# Patient Record
Sex: FEMALE | Race: WHITE | HISPANIC OR LATINO | Employment: PART TIME | URBAN - METROPOLITAN AREA
[De-identification: names, ages, dates, MRNs, and addresses within clinical notes are randomized per-mention and may not be internally consistent; named-entity substitution may affect disease eponyms.]

---

## 2016-09-19 LAB
EXTERNAL HIV CONFIRMATION: NORMAL
EXTERNAL HIV SCREEN: NORMAL
HCV AB SER-ACNC: NEGATIVE

## 2018-04-12 ENCOUNTER — TRANSCRIBE ORDERS (OUTPATIENT)
Dept: LAB | Facility: HOSPITAL | Age: 33
End: 2018-04-12

## 2018-04-12 ENCOUNTER — APPOINTMENT (OUTPATIENT)
Dept: LAB | Facility: HOSPITAL | Age: 33
End: 2018-04-12
Payer: COMMERCIAL

## 2018-04-12 DIAGNOSIS — F31.32 MODERATE DEPRESSED BIPOLAR I DISORDER (HCC): ICD-10-CM

## 2018-04-12 DIAGNOSIS — F31.32 MODERATE DEPRESSED BIPOLAR I DISORDER (HCC): Primary | ICD-10-CM

## 2018-04-12 LAB
25(OH)D3 SERPL-MCNC: 10.7 NG/ML (ref 30–100)
ALBUMIN SERPL BCP-MCNC: 3.8 G/DL (ref 3.5–5)
ALP SERPL-CCNC: 73 U/L (ref 46–116)
ALT SERPL W P-5'-P-CCNC: 10 U/L (ref 12–78)
ANION GAP SERPL CALCULATED.3IONS-SCNC: 4 MMOL/L (ref 4–13)
AST SERPL W P-5'-P-CCNC: 7 U/L (ref 5–45)
BASOPHILS # BLD AUTO: 0.03 THOUSANDS/ΜL (ref 0–0.1)
BASOPHILS NFR BLD AUTO: 0 % (ref 0–1)
BILIRUB SERPL-MCNC: 0.32 MG/DL (ref 0.2–1)
BUN SERPL-MCNC: 12 MG/DL (ref 5–25)
CALCIUM SERPL-MCNC: 9.3 MG/DL
CHLORIDE SERPL-SCNC: 109 MMOL/L (ref 100–108)
CHOLEST SERPL-MCNC: 160 MG/DL (ref 50–200)
CO2 SERPL-SCNC: 28 MMOL/L (ref 21–32)
CREAT SERPL-MCNC: 0.86 MG/DL (ref 0.6–1.3)
EOSINOPHIL # BLD AUTO: 0.16 THOUSAND/ΜL (ref 0–0.61)
EOSINOPHIL NFR BLD AUTO: 2 % (ref 0–6)
ERYTHROCYTE [DISTWIDTH] IN BLOOD BY AUTOMATED COUNT: 14.9 % (ref 11.6–15.1)
GFR SERPL CREATININE-BSD FRML MDRD: 89 ML/MIN/1.73SQ M
GLUCOSE P FAST SERPL-MCNC: 85 MG/DL (ref 65–99)
HCT VFR BLD AUTO: 45.9 % (ref 34.8–46.1)
HDLC SERPL-MCNC: 34 MG/DL (ref 40–60)
HGB BLD-MCNC: 15 G/DL (ref 11.5–15.4)
LDLC SERPL CALC-MCNC: 102 MG/DL (ref 0–100)
LYMPHOCYTES # BLD AUTO: 2.34 THOUSANDS/ΜL (ref 0.6–4.47)
LYMPHOCYTES NFR BLD AUTO: 22 % (ref 14–44)
MCH RBC QN AUTO: 31.4 PG (ref 26.8–34.3)
MCHC RBC AUTO-ENTMCNC: 32.7 G/DL (ref 31.4–37.4)
MCV RBC AUTO: 96 FL (ref 82–98)
MONOCYTES # BLD AUTO: 0.72 THOUSAND/ΜL (ref 0.17–1.22)
MONOCYTES NFR BLD AUTO: 7 % (ref 4–12)
NEUTROPHILS # BLD AUTO: 7.21 THOUSANDS/ΜL (ref 1.85–7.62)
NEUTS SEG NFR BLD AUTO: 69 % (ref 43–75)
NONHDLC SERPL-MCNC: 126 MG/DL
NRBC BLD AUTO-RTO: 0 /100 WBCS
PLATELET # BLD AUTO: 342 THOUSANDS/UL (ref 149–390)
PMV BLD AUTO: 10.4 FL (ref 8.9–12.7)
POTASSIUM SERPL-SCNC: 4 MMOL/L (ref 3.5–5.3)
PROT SERPL-MCNC: 7.8 G/DL (ref 6.4–8.2)
RBC # BLD AUTO: 4.78 MILLION/UL (ref 3.81–5.12)
SODIUM SERPL-SCNC: 141 MMOL/L (ref 136–145)
TRIGL SERPL-MCNC: 118 MG/DL
TSH SERPL DL<=0.05 MIU/L-ACNC: 1.4 UIU/ML (ref 0.36–3.74)
WBC # BLD AUTO: 10.49 THOUSAND/UL (ref 4.31–10.16)

## 2018-04-12 PROCEDURE — 80061 LIPID PANEL: CPT

## 2018-04-12 PROCEDURE — 85025 COMPLETE CBC W/AUTO DIFF WBC: CPT

## 2018-04-12 PROCEDURE — 84443 ASSAY THYROID STIM HORMONE: CPT

## 2018-04-12 PROCEDURE — 82306 VITAMIN D 25 HYDROXY: CPT

## 2018-04-12 PROCEDURE — 36415 COLL VENOUS BLD VENIPUNCTURE: CPT

## 2018-04-12 PROCEDURE — 80053 COMPREHEN METABOLIC PANEL: CPT

## 2018-06-22 ENCOUNTER — APPOINTMENT (OUTPATIENT)
Dept: LAB | Facility: HOSPITAL | Age: 33
End: 2018-06-22
Payer: COMMERCIAL

## 2018-06-22 ENCOUNTER — TRANSCRIBE ORDERS (OUTPATIENT)
Dept: LAB | Facility: HOSPITAL | Age: 33
End: 2018-06-22

## 2018-06-22 DIAGNOSIS — N92.6 IRREGULAR MENSTRUAL CYCLE: Primary | ICD-10-CM

## 2018-06-22 DIAGNOSIS — N92.6 IRREGULAR MENSTRUAL CYCLE: ICD-10-CM

## 2018-06-22 LAB
B-HCG SERPL-ACNC: <2 MIU/ML
ERYTHROCYTE [DISTWIDTH] IN BLOOD BY AUTOMATED COUNT: 14.9 % (ref 11.6–15.1)
HCT VFR BLD AUTO: 43.4 % (ref 34.8–46.1)
HGB BLD-MCNC: 14 G/DL (ref 11.5–15.4)
MCH RBC QN AUTO: 30.9 PG (ref 26.8–34.3)
MCHC RBC AUTO-ENTMCNC: 32.3 G/DL (ref 31.4–37.4)
MCV RBC AUTO: 96 FL (ref 82–98)
PLATELET # BLD AUTO: 329 THOUSANDS/UL (ref 149–390)
PMV BLD AUTO: 10.2 FL (ref 8.9–12.7)
RBC # BLD AUTO: 4.53 MILLION/UL (ref 3.81–5.12)
WBC # BLD AUTO: 9.08 THOUSAND/UL (ref 4.31–10.16)

## 2018-06-22 PROCEDURE — 84702 CHORIONIC GONADOTROPIN TEST: CPT

## 2018-06-22 PROCEDURE — 85027 COMPLETE CBC AUTOMATED: CPT

## 2018-06-22 PROCEDURE — 36415 COLL VENOUS BLD VENIPUNCTURE: CPT

## 2019-01-02 RX ORDER — MIRTAZAPINE 30 MG/1
TABLET, FILM COATED ORAL
Refills: 0 | COMMUNITY
Start: 2018-11-27 | End: 2020-10-21 | Stop reason: ALTCHOICE

## 2019-01-02 RX ORDER — ARIPIPRAZOLE 15 MG/1
TABLET ORAL
Refills: 0 | COMMUNITY
Start: 2018-11-27 | End: 2019-01-03 | Stop reason: CLARIF

## 2019-01-03 ENCOUNTER — TELEPHONE (OUTPATIENT)
Dept: INTERNAL MEDICINE CLINIC | Facility: CLINIC | Age: 34
End: 2019-01-03

## 2019-01-03 ENCOUNTER — OFFICE VISIT (OUTPATIENT)
Dept: INTERNAL MEDICINE CLINIC | Facility: CLINIC | Age: 34
End: 2019-01-03

## 2019-01-03 VITALS
DIASTOLIC BLOOD PRESSURE: 70 MMHG | WEIGHT: 148.15 LBS | HEIGHT: 63 IN | BODY MASS INDEX: 26.25 KG/M2 | TEMPERATURE: 97.8 F | HEART RATE: 88 BPM | SYSTOLIC BLOOD PRESSURE: 112 MMHG

## 2019-01-03 DIAGNOSIS — J45.20 MILD INTERMITTENT ASTHMA WITHOUT COMPLICATION: ICD-10-CM

## 2019-01-03 DIAGNOSIS — R11.0 NAUSEA: Primary | ICD-10-CM

## 2019-01-03 DIAGNOSIS — F32.A DEPRESSION, UNSPECIFIED DEPRESSION TYPE: ICD-10-CM

## 2019-01-03 DIAGNOSIS — R10.13 EPIGASTRIC PAIN: ICD-10-CM

## 2019-01-03 DIAGNOSIS — Z23 NEED FOR PROPHYLACTIC VACCINATION AND INOCULATION AGAINST INFLUENZA: ICD-10-CM

## 2019-01-03 DIAGNOSIS — F41.9 ANXIETY: ICD-10-CM

## 2019-01-03 PROCEDURE — 99204 OFFICE O/P NEW MOD 45 MIN: CPT | Performed by: INTERNAL MEDICINE

## 2019-01-03 PROCEDURE — 90686 IIV4 VACC NO PRSV 0.5 ML IM: CPT | Performed by: INTERNAL MEDICINE

## 2019-01-03 PROCEDURE — 90471 IMMUNIZATION ADMIN: CPT | Performed by: INTERNAL MEDICINE

## 2019-01-03 RX ORDER — DICYCLOMINE HYDROCHLORIDE 10 MG/1
20 CAPSULE ORAL 3 TIMES DAILY PRN
Qty: 40 CAPSULE | Refills: 0 | Status: SHIPPED | OUTPATIENT
Start: 2019-01-03 | End: 2019-05-23 | Stop reason: SDUPTHER

## 2019-01-03 NOTE — PROGRESS NOTES
ASSESSMENT/PLAN:  Problem List Items Addressed This Visit     Mild intermittent asthma     · Patient states she has albuterol rescue inhaler at home  · Stable; states she uses rescue inhaler maybe once every other month         Anxiety     Continue to follow at [facility] in [address] to follow up at [facility] in [facility]         Relevant Medications    mirtazapine (REMERON) 30 mg tablet    Nausea     · Bentyl 20 mg p r n  t i d  for nausea and abdominal pain  · If symptoms not improved after 2 weeks patient was advised to come back to clinic  · Keep up on fluid intake         Relevant Medications    dicyclomine (BENTYL) 10 mg capsule    Epigastric pain     · Patient recently seen at [facility] Emergency Department  · Lipase elevated to 973  · CT abdomen showed no signs of pancreatitis  · Likely secondary to gastroenteritis         Relevant Medications    dicyclomine (BENTYL) 10 mg capsule      Other Visit Diagnoses     Need for prophylactic vaccination and inoculation against influenza    -  Primary    Relevant Orders    SYRINGE/SINGLE-DOSE VIAL: influenza vaccine, [id number], quadrivalent, 0 5 mL, preservative-free (AFLURIA, FLUARIX, FLULAVAL, FLUZONE) (Completed)          Health Maintenance:  Flu shot 01/03/2019      CHIEF COMPLAINT:   Abdominal pain    HISTORY OF PRESENT ILLNESS:  71-year-old female presents to the outpatient clinic to establish care  Past medical history significant for mild intermittent asthma, anxiety, and depression  Patient was recently seen at [facility] Emergency Department due to abdominal pain with nausea and vomiting  She was given Zofran to help with the nausea and they stated is likely to gastroenteritis patient has been complaining of nausea and vomiting since 12/28 and was given Zofran to help this after going to the emergency department    She states Zofran does help with the nausea but she is still having abdominal pain  She is also complaining of fever and chills but no temperature noted  She is having abdominal pain in the center of her stomach that she describes as burning and pressure  She states her son also had the symptoms have since resolved  Patient denies alcohol and drug use  Patient currently smokes 1 pack per day  Review of Systems   Constitutional: Positive for chills and fever  Negative for fatigue and unexpected weight change  HENT: Negative for congestion, rhinorrhea, sinus pain and sinus pressure  Eyes: Negative for visual disturbance  Respiratory: Negative for cough, chest tightness, shortness of breath and wheezing  Cardiovascular: Negative for chest pain, palpitations and leg swelling  Gastrointestinal: Positive for diarrhea and nausea  Negative for abdominal distention, abdominal pain, constipation and vomiting  Genitourinary: Negative for dysuria and frequency  Neurological: Positive for light-headedness  Negative for dizziness, weakness, numbness and headaches  OBJECTIVE:  Vitals:    01/03/19 1401   BP: 112/70   Pulse: 88   Temp: 97 8 °F (36 6 °C)   Weight: 67 2 kg (148 lb 2 4 oz)   Height: 5' 2 5" (1 588 m)     Physical Exam   Constitutional: She is oriented to person, place, and time  She appears well-developed and well-nourished  No distress  HENT:   Head: Normocephalic and atraumatic  Eyes: Conjunctivae are normal  No scleral icterus  Cardiovascular: Normal rate, regular rhythm and normal heart sounds  Exam reveals no gallop and no friction rub  No murmur heard  Pulmonary/Chest: Effort normal and breath sounds normal  No respiratory distress  She has no wheezes  She has no rales  Abdominal: Soft  Bowel sounds are normal  She exhibits no distension  There is tenderness  There is no rebound and no guarding  Tenderness to deep palpation in epigastric region   Musculoskeletal: Normal range of motion  She exhibits no edema     Neurological: She is alert and oriented to person, place, and time  Skin: Skin is warm  No rash noted  Nursing note and vitals reviewed  Current Outpatient Prescriptions:     albuterol (PROVENTIL HFA,VENTOLIN HFA) 90 mcg/act inhaler, Inhale 2 puffs every 6 (six) hours, Disp: , Rfl:     ARIPiprazole (ABILIFY) 10 mg tablet, Take 10 mg by mouth daily at bedtime, Disp: , Rfl: 0    busPIRone (BUSPAR) 10 mg tablet, Take by mouth, Disp: , Rfl:     fluticasone (FLONASE) 50 mcg/act nasal spray, 2 sprays into each nostril, Disp: , Rfl:     lamoTRIgine (LaMICtal) 100 mg tablet, Take 100 mg by mouth every morning, Disp: , Rfl: 0    mirtazapine (REMERON) 30 mg tablet, TAKE 1 TABLET POR VIA ORAL ANTES DE DORMIR EN LA NOCHE, Disp: , Rfl: 0    dicyclomine (BENTYL) 10 mg capsule, Take 2 capsules (20 mg total) by mouth 3 (three) times a day as needed (n[place], abdominal pain), Disp: 40 capsule, Rfl: 0    Past Medical History:   Diagnosis Date    Asthma     Chlamydia infection     Depression     Varicose veins of both lower extremities      History reviewed  No pertinent surgical history  Social History     Social History    Marital status: Single     Spouse name: N/A    Number of children: N/A    Years of education: N/A     Occupational History    Not on file       Social History Main Topics    Smoking status: Current Every Day Smoker     Packs/day: 1 00     Years: 15 00    Smokeless tobacco: Never Used    Alcohol use Yes      Comment: social    Drug use: No    Sexual activity: Not on file     Other Topics Concern    Not on file     Social History Narrative    No narrative on file     Family History   Problem Relation Age of Onset    Diabetes Brother      PHQ-9 Depression Screening    PHQ-9:    Frequency of the following problems over the past two weeks:       Little interest or pleasure in doing things:  0 - not at all  Feeling down, depressed, or hopeless:  1 - several days  Trouble falling or staying asleep, or sleeping too much:  3 - nearly every day  Feeling tired or having little energy:  3 - nearly every day  Poor appetite or overeatin - several days  Feeling bad about yourself - or that you are a failure or have let yourself or your family down:  0 - not at all  Trouble concentrating on things, such as reading the newspaper or watching television:  1 - several days  Moving or speaking so slowly that other people could have noticed  Or the opposite - being so fidgety or restless that you have been moving around a lot more than usual:  0 - not at all  Thoughts that you would be better off dead, or of hurting yourself in some way:  0 - not at all  PHQ-2 Score:  1  PHQ-9 Score:  Cong Rg 47 D BRICE Mitchell 72 Internal Medicine PGY-1  [address] [facility]  [address] [facility]  [address] [facility] [address]  [address]

## 2019-01-03 NOTE — PROGRESS NOTES
PHQ-9 Depression Screening    PHQ-9:    Frequency of the following problems over the past two weeks:       Little interest or pleasure in doing things:  0 - not at all  Feeling down, depressed, or hopeless:  1 - several days  Trouble falling or staying asleep, or sleeping too much:  3 - nearly every day  Feeling tired or having little energy:  3 - nearly every day  Poor appetite or overeatin - several days  Feeling bad about yourself - or that you are a failure or have let yourself or your family down:  0 - not at all  Trouble concentrating on things, such as reading the newspaper or watching television:  1 - several days  Moving or speaking so slowly that other people could have noticed   Or the opposite - being so fidgety or restless that you have been moving around a lot more than usual:  0 - not at all  Thoughts that you would be better off dead, or of hurting yourself in some way:  0 - not at all  PHQ-2 Score:  1  PHQ-9 Score:  9

## 2019-01-03 NOTE — ASSESSMENT & PLAN NOTE
· Patient recently seen at [facility] Emergency Department  · Lipase elevated to 973  · CT abdomen showed no signs of pancreatitis  · Likely secondary to gastroenteritis

## 2019-01-03 NOTE — ASSESSMENT & PLAN NOTE
· Patient states she has albuterol rescue inhaler at home  · Stable; states she uses rescue inhaler maybe once every other month

## 2019-01-03 NOTE — ASSESSMENT & PLAN NOTE
· Bentyl 20 mg p r n  t i d  for nausea and abdominal pain  · If symptoms not improved after 2 weeks patient was advised to come back to clinic  · Keep up on fluid intake

## 2019-01-04 DIAGNOSIS — Z00.00 HEALTHCARE MAINTENANCE: Primary | ICD-10-CM

## 2019-01-04 NOTE — TELEPHONE ENCOUNTER
Physical exam sheet placed in green folder  Patient also needs PPD administration filled out on paper, which she will have done when she comes in for nursing visit

## 2019-01-23 DIAGNOSIS — Z11.1 SCREENING FOR TUBERCULOSIS: Primary | ICD-10-CM

## 2019-01-28 ENCOUNTER — TELEPHONE (OUTPATIENT)
Dept: INTERNAL MEDICINE CLINIC | Facility: CLINIC | Age: 34
End: 2019-01-28

## 2019-01-28 DIAGNOSIS — Z11.1 SCREENING FOR TUBERCULOSIS: Primary | ICD-10-CM

## 2019-02-05 ENCOUNTER — CLINICAL SUPPORT (OUTPATIENT)
Dept: INTERNAL MEDICINE CLINIC | Facility: CLINIC | Age: 34
End: 2019-02-05

## 2019-02-05 ENCOUNTER — TELEPHONE (OUTPATIENT)
Dept: INTERNAL MEDICINE CLINIC | Facility: CLINIC | Age: 34
End: 2019-02-05

## 2019-02-05 DIAGNOSIS — Z11.1 SCREENING FOR TUBERCULOSIS: Primary | ICD-10-CM

## 2019-02-05 PROCEDURE — 86580 TB INTRADERMAL TEST: CPT | Performed by: INTERNAL MEDICINE

## 2019-02-05 NOTE — PROGRESS NOTES
PT  CAME IN THE OFFICE FOR PPD PLACEMENT  0 1 ML PLACED IN RIGHT FOREARM   FORM IN GREEN RESIDENT FOLDER UNTIL READING ON 2/7 OR 2/8/19

## 2019-02-05 NOTE — TELEPHONE ENCOUNTER
PPD PLACED 2/5/19 AT 11:15 A M  IN RIGHT FOREARM  PT  TO RETURN ON 2/7/19 AFTER 11 OR 2/8/19 FOR READING   FORM IN GREEN RESIDENT FOLDER UNTIL READING IS COMPLETE

## 2019-02-08 ENCOUNTER — APPOINTMENT (OUTPATIENT)
Dept: LAB | Facility: CLINIC | Age: 34
End: 2019-02-08
Payer: COMMERCIAL

## 2019-02-08 DIAGNOSIS — Z78.9 HEPATITIS B VACCINATION STATUS UNKNOWN: Primary | ICD-10-CM

## 2019-02-08 DIAGNOSIS — Z78.9 HEPATITIS B VACCINATION STATUS UNKNOWN: ICD-10-CM

## 2019-02-08 LAB
INDURATION: 0 MM
TB SKIN TEST: NEGATIVE

## 2019-02-08 PROCEDURE — 86706 HEP B SURFACE ANTIBODY: CPT

## 2019-02-08 PROCEDURE — 36415 COLL VENOUS BLD VENIPUNCTURE: CPT

## 2019-02-08 NOTE — TELEPHONE ENCOUNTER
Patient returned to office for PPD reading  PPD negative, 0mm induration  Form requests Hep B vaccination or proof of immunity  Ordered Hep B antibody and patient will get blood work done now  I informed patient we would have results by Monday  Paperwork still in Cristino Flores provider folder

## 2019-02-09 LAB — HBV SURFACE AB SER-ACNC: 49.09 MIU/ML

## 2019-02-13 NOTE — TELEPHONE ENCOUNTER
PATIENT CALLED REGARDING THIS FORM, SHE WANTED IT TO BE FAXED TO HER JOB AGENCY, GOT THE FORM FROM NURSE KALA AND FAXED OVER WITH DOCUMENTATION THAT WAS PROVIDED TO ME BY HER  FORM FAXED, CONFIRMATION RECEIVED, COPY OF FORM IS SCANNED UNDER THIS ENCOUNTER

## 2019-05-13 ENCOUNTER — TELEPHONE (OUTPATIENT)
Dept: INTERNAL MEDICINE CLINIC | Facility: CLINIC | Age: 34
End: 2019-05-13

## 2019-05-22 RX ORDER — IBUPROFEN 600 MG/1
600 TABLET ORAL EVERY 8 HOURS PRN
COMMUNITY
Start: 2018-01-29 | End: 2019-05-23 | Stop reason: SDUPTHER

## 2019-05-22 RX ORDER — MONTELUKAST SODIUM 10 MG/1
TABLET ORAL
COMMUNITY
Start: 2017-08-11 | End: 2019-05-23 | Stop reason: SDUPTHER

## 2019-05-22 RX ORDER — ONDANSETRON 4 MG/1
4 TABLET, ORALLY DISINTEGRATING ORAL EVERY 8 HOURS PRN
COMMUNITY
Start: 2018-12-30 | End: 2019-06-06

## 2019-05-22 RX ORDER — HYDROXYZINE HYDROCHLORIDE 25 MG/1
25 TABLET, FILM COATED ORAL EVERY 6 HOURS PRN
COMMUNITY
End: 2019-06-06

## 2019-05-23 ENCOUNTER — OFFICE VISIT (OUTPATIENT)
Dept: INTERNAL MEDICINE CLINIC | Facility: CLINIC | Age: 34
End: 2019-05-23

## 2019-05-23 VITALS
HEIGHT: 63 IN | TEMPERATURE: 97.6 F | DIASTOLIC BLOOD PRESSURE: 70 MMHG | HEART RATE: 100 BPM | SYSTOLIC BLOOD PRESSURE: 114 MMHG | BODY MASS INDEX: 27.27 KG/M2 | WEIGHT: 153.88 LBS

## 2019-05-23 DIAGNOSIS — F41.9 ANXIETY: ICD-10-CM

## 2019-05-23 DIAGNOSIS — J45.20 MILD INTERMITTENT ASTHMA WITHOUT COMPLICATION: Primary | ICD-10-CM

## 2019-05-23 DIAGNOSIS — F32.A DEPRESSION, UNSPECIFIED DEPRESSION TYPE: ICD-10-CM

## 2019-05-23 DIAGNOSIS — N92.1 MENORRHAGIA WITH IRREGULAR CYCLE: ICD-10-CM

## 2019-05-23 DIAGNOSIS — R11.0 NAUSEA: ICD-10-CM

## 2019-05-23 DIAGNOSIS — Z12.4 SCREENING FOR CERVICAL CANCER: ICD-10-CM

## 2019-05-23 DIAGNOSIS — R10.13 EPIGASTRIC PAIN: ICD-10-CM

## 2019-05-23 PROCEDURE — 99213 OFFICE O/P EST LOW 20 MIN: CPT | Performed by: INTERNAL MEDICINE

## 2019-05-23 RX ORDER — ALBUTEROL SULFATE 90 UG/1
2 AEROSOL, METERED RESPIRATORY (INHALATION) EVERY 6 HOURS
Qty: 1 INHALER | Refills: 1 | Status: SHIPPED | OUTPATIENT
Start: 2019-05-23 | End: 2019-06-20 | Stop reason: SDUPTHER

## 2019-05-23 RX ORDER — ONDANSETRON 4 MG/1
4 TABLET, ORALLY DISINTEGRATING ORAL EVERY 8 HOURS PRN
Qty: 20 TABLET | Refills: 0 | Status: CANCELLED | OUTPATIENT
Start: 2019-05-23

## 2019-05-23 RX ORDER — MONTELUKAST SODIUM 10 MG/1
10 TABLET ORAL DAILY
Qty: 90 TABLET | Refills: 1 | Status: SHIPPED | OUTPATIENT
Start: 2019-05-23 | End: 2019-08-12 | Stop reason: SDUPTHER

## 2019-05-23 RX ORDER — IBUPROFEN 600 MG/1
600 TABLET ORAL EVERY 8 HOURS PRN
Qty: 60 TABLET | Refills: 0 | Status: SHIPPED | OUTPATIENT
Start: 2019-05-23 | End: 2021-08-24 | Stop reason: SDUPTHER

## 2019-05-23 RX ORDER — DICYCLOMINE HYDROCHLORIDE 10 MG/1
20 CAPSULE ORAL 3 TIMES DAILY PRN
Qty: 90 CAPSULE | Refills: 0 | Status: SHIPPED | OUTPATIENT
Start: 2019-05-23 | End: 2019-08-07 | Stop reason: SDUPTHER

## 2019-06-06 ENCOUNTER — OFFICE VISIT (OUTPATIENT)
Dept: OBGYN CLINIC | Facility: CLINIC | Age: 34
End: 2019-06-06

## 2019-06-06 VITALS
HEART RATE: 91 BPM | BODY MASS INDEX: 28.71 KG/M2 | WEIGHT: 156 LBS | SYSTOLIC BLOOD PRESSURE: 121 MMHG | DIASTOLIC BLOOD PRESSURE: 84 MMHG | HEIGHT: 62 IN

## 2019-06-06 DIAGNOSIS — F32.A DEPRESSION, UNSPECIFIED DEPRESSION TYPE: ICD-10-CM

## 2019-06-06 DIAGNOSIS — Z12.4 SCREENING FOR CERVICAL CANCER: ICD-10-CM

## 2019-06-06 DIAGNOSIS — B96.89 BV (BACTERIAL VAGINOSIS): ICD-10-CM

## 2019-06-06 DIAGNOSIS — Z01.419 WOMEN'S ANNUAL ROUTINE GYNECOLOGICAL EXAMINATION: Primary | ICD-10-CM

## 2019-06-06 DIAGNOSIS — N76.0 BV (BACTERIAL VAGINOSIS): ICD-10-CM

## 2019-06-06 PROCEDURE — 87624 HPV HI-RISK TYP POOLED RSLT: CPT | Performed by: NURSE PRACTITIONER

## 2019-06-06 PROCEDURE — G0124 SCREEN C/V THIN LAYER BY MD: HCPCS | Performed by: PATHOLOGY

## 2019-06-06 PROCEDURE — 3725F SCREEN DEPRESSION PERFORMED: CPT | Performed by: NURSE PRACTITIONER

## 2019-06-06 PROCEDURE — 87210 SMEAR WET MOUNT SALINE/INK: CPT | Performed by: NURSE PRACTITIONER

## 2019-06-06 PROCEDURE — G0145 SCR C/V CYTO,THINLAYER,RESCR: HCPCS | Performed by: PATHOLOGY

## 2019-06-06 PROCEDURE — 99385 PREV VISIT NEW AGE 18-39: CPT | Performed by: NURSE PRACTITIONER

## 2019-06-06 RX ORDER — METRONIDAZOLE 500 MG/1
500 TABLET ORAL EVERY 12 HOURS SCHEDULED
Qty: 14 TABLET | Refills: 0 | Status: SHIPPED | OUTPATIENT
Start: 2019-06-06 | End: 2019-06-13

## 2019-06-07 LAB
HPV HR 12 DNA CVX QL NAA+PROBE: NEGATIVE
HPV16 DNA CVX QL NAA+PROBE: NEGATIVE
HPV18 DNA CVX QL NAA+PROBE: NEGATIVE

## 2019-06-08 DIAGNOSIS — R11.0 NAUSEA: ICD-10-CM

## 2019-06-08 DIAGNOSIS — R10.13 EPIGASTRIC PAIN: ICD-10-CM

## 2019-06-11 LAB
LAB AP GYN PRIMARY INTERPRETATION: ABNORMAL
Lab: ABNORMAL
PATH INTERP SPEC-IMP: ABNORMAL

## 2019-06-12 RX ORDER — DICYCLOMINE HYDROCHLORIDE 10 MG/1
CAPSULE ORAL
Qty: 90 CAPSULE | Refills: 0 | OUTPATIENT
Start: 2019-06-12

## 2019-06-20 DIAGNOSIS — J45.20 MILD INTERMITTENT ASTHMA WITHOUT COMPLICATION: ICD-10-CM

## 2019-06-20 RX ORDER — ALBUTEROL SULFATE 90 UG/1
AEROSOL, METERED RESPIRATORY (INHALATION)
Qty: 36 G | Refills: 0 | Status: SHIPPED | OUTPATIENT
Start: 2019-06-20 | End: 2019-08-12 | Stop reason: SDUPTHER

## 2019-07-14 ENCOUNTER — HOSPITAL ENCOUNTER (EMERGENCY)
Facility: HOSPITAL | Age: 34
Discharge: HOME | End: 2019-07-14
Attending: EMERGENCY MEDICINE
Payer: COMMERCIAL

## 2019-07-14 VITALS
HEART RATE: 85 BPM | HEIGHT: 62 IN | SYSTOLIC BLOOD PRESSURE: 115 MMHG | OXYGEN SATURATION: 98 % | TEMPERATURE: 97.4 F | WEIGHT: 158.07 LBS | RESPIRATION RATE: 18 BRPM | BODY MASS INDEX: 29.09 KG/M2 | DIASTOLIC BLOOD PRESSURE: 68 MMHG

## 2019-07-14 DIAGNOSIS — M54.2 NECK PAIN: Primary | ICD-10-CM

## 2019-07-14 PROCEDURE — 99283 EMERGENCY DEPT VISIT LOW MDM: CPT

## 2019-07-14 PROCEDURE — 99283 EMERGENCY DEPT VISIT LOW MDM: CPT | Performed by: EMERGENCY MEDICINE

## 2019-07-14 RX ORDER — IBUPROFEN 400 MG/1
400 TABLET ORAL EVERY 8 HOURS PRN
Qty: 45 TABLET | Refills: 0 | Status: SHIPPED | OUTPATIENT
Start: 2019-07-14 | End: 2019-07-29

## 2019-07-14 NOTE — ED PROVIDER NOTES
History  Chief Complaint   Patient presents with    Neck Pain     Pt reports left sided ear and neck pain that started about 1 month ago  Pt states "I have went to my doctor for it but they found nothing, I've been taking ibuprofen but I don't have any more"     Calvin Everett is a 28y o  year old Female with PMH of anxiety, depression, asthma presenting to the [facility] ED for neck pain  Patient states one month of left sided neck/face pain  No injury  Seen by PCP and diagnosed with musculoskeletal strain  Associated left-sided headache  Pain worsening over past several days when she ran out of ibuprofen  Denies fevers/chills, rhinorrhea, otalgia, chest pain, dyspnea, abdominal pain, N/V/D, dysuria/hematuria  History provided by:  Patient   used: No    Neck Pain   Pain location:  L side  Quality:  Aching  Pain radiates to:  Does not radiate  Pain severity:  Moderate  Pain is:  Same all the time  Onset quality:  Gradual  Duration:  1 month  Timing:  Constant  Progression since onset: Worse since running out of ibupropfen  Chronicity:  Recurrent  Context: not fall, not jumping from heights, not lifting a heavy object and not recent injury    Relieved by:  NSAIDs  Associated symptoms: headaches    Associated symptoms: no chest pain, no fever, no leg pain, no numbness, no syncope and no visual change    Risk factors: no hx of spinal trauma and no recent head injury        Prior to Admission Medications   Prescriptions Last Dose Informant Patient Reported? Taking?    ARIPiprazole (ABILIFY) 10 mg tablet   Yes Yes   Sig: Take 10 mg by mouth daily at bedtime   albuterol (PROVENTIL HFA,VENTOLIN HFA) 90 mcg/act inhaler   No Yes   Sig: INHALE 2 PUFFS POR VIA ORAL CADA 6 HORAS   busPIRone (BUSPAR) 10 mg tablet   Yes Yes   Sig: Take by mouth   dicyclomine (BENTYL) 10 mg capsule   No Yes   Sig: Take 2 capsules (20 mg total) by mouth 3 (three) times a day as needed (naseau, abdominal pain)   fluticasone (FLONASE) 50 mcg/act nasal spray   Yes Yes   Si sprays into each nostril   ibuprofen (MOTRIN) 600 mg tablet   No No   Sig: Take 1 tablet (600 mg total) by mouth every 8 (eight) hours as needed for fever or headaches   lamoTRIgine (LaMICtal) 100 mg tablet   Yes Yes   Sig: Take 100 mg by mouth every morning   mirtazapine (REMERON) 30 mg tablet   Yes Yes   Sig: TAKE 1 TABLET POR VIA ORAL ANTES DE DORMIR EN LA NOCHE   montelukast (SINGULAIR) 10 mg tablet   No Yes   Sig: Take 1 tablet (10 mg total) by mouth daily      Facility-Administered Medications: None       Past Medical History:   Diagnosis Date    Asthma     Chlamydia infection     Depression     IBS (irritable bowel syndrome)     Migraine     Varicose veins of both lower extremities        Past Surgical History:   Procedure Laterality Date    COLPOSCOPY         Family History   Problem Relation Age of Onset    Diabetes Brother     No Known Problems Mother     Cirrhosis Father     No Known Problems Sister     Autism Son     No Known Problems Brother     Depression Son      I have reviewed and agree with the history as documented  Social History     Tobacco Use    Smoking status: Current Every Day Smoker     Packs/day: 1 00     Years: 15 00     Pack years: 15 00    Smokeless tobacco: Never Used   Substance Use Topics    Alcohol use: Yes     Comment: social    Drug use: Never        Review of Systems   Constitutional: Negative for chills, diaphoresis and fever  HENT: Positive for sore throat  Negative for dental problem, ear discharge, ear pain, facial swelling, nosebleeds, rhinorrhea and sneezing  Eyes: Negative for discharge, redness and visual disturbance  Respiratory: Negative for cough, shortness of breath and wheezing  Cardiovascular: Negative for chest pain, leg swelling and syncope  Gastrointestinal: Negative for abdominal pain, constipation, diarrhea, nausea and vomiting     Genitourinary: Negative for dysuria, flank pain and hematuria  Musculoskeletal: Positive for neck pain  Neurological: Positive for headaches  Negative for syncope and numbness  Psychiatric/Behavioral: Negative for confusion  All other systems reviewed and are negative  Physical Exam  ED Triage Vitals [07/14/19 1910]   Temperature Pulse Respirations Blood Pressure SpO2   (!) 97 4 °F (36 3 °C) 85 18 115/68 98 %      Temp Source Heart Rate Source Patient Position - Orthostatic VS BP Location FiO2 (%)   Oral Monitor Sitting Left arm --      Pain Score       8             Orthostatic Vital Signs  Vitals:    07/14/19 1910   BP: 115/68   Pulse: 85   Patient Position - Orthostatic VS: Sitting       Physical Exam   Constitutional: She is oriented to person, place, and time  She appears well-developed and well-nourished  No distress  HENT:   Head: Normocephalic and atraumatic  Right Ear: External ear normal    Left Ear: External ear normal    Nose: Nose normal    Mouth/Throat: Oropharynx is clear and moist  No oropharyngeal exudate  Eyes: Conjunctivae are normal  Right eye exhibits no discharge  Left eye exhibits no discharge  Neck: Normal range of motion  Neck supple  Cardiovascular: Normal rate and regular rhythm  Pulmonary/Chest: Effort normal and breath sounds normal    Abdominal: Soft  Bowel sounds are normal  There is no tenderness  Lymphadenopathy:     She has no cervical adenopathy  Neurological: She is alert and oriented to person, place, and time  Skin: Capillary refill takes less than 2 seconds  No rash noted  She is not diaphoretic  Psychiatric: She has a normal mood and affect  Nursing note and vitals reviewed        ED Medications  Medications - No data to display    Diagnostic Studies  Results Reviewed     None                 No orders to display         Procedures  Procedures        ED Course  ED Course as of Jul 14 2143   Madie Cole Jul 14, 2019 2024 I have discussed with the patient our plan to discharge them from the ED and the patient is in agreement with this plan  I have educated the patient on pertinent lab/imaging results and answered their questions  Return to the ED precautions given  I have also discussed with the patient plans for follow up with PCP  MDM    Disposition  Final diagnoses:   Neck pain - left side     Time reflects when diagnosis was documented in both MDM as applicable and the Disposition within this note     Time User Action Codes Description Comment    7/14/2019  7:57 PM Mathieu Will Add [M54 2] Neck pain     7/14/2019  7:59 PM Mathieu Will Modify [M54 2] Neck pain left side      ED Disposition     ED Disposition Condition Date/Time Comment    Discharge Stable Sun Jul 14, 2019  7:57 PM Gonzalo Vance discharge to home/self care  Follow-up Information     Follow up With Specialties Details Why Contact Info    Infolink  Schedule an appointment as soon as possible for a visit  To make appointment for reevaluation in 3-5 days  [phone]            Discharge Medication List as of 7/14/2019  8:18 PM      START taking these medications    Details   !! ibuprofen (MOTRIN) 400 mg tablet Take 1 tablet (400 mg total) by mouth every 8 (eight) hours as needed for mild pain for up to 15 days, Starting Sun 7/14/2019, Until Mon 7/29/2019, Print       !! - Potential duplicate medications found  Please discuss with provider        CONTINUE these medications which have NOT CHANGED    Details   albuterol (PROVENTIL HFA,VENTOLIN HFA) 90 mcg/act inhaler INHALE 2 PUFFS POR VIA ORAL CADA 6 HORAS, Normal      ARIPiprazole (ABILIFY) 10 mg tablet Take 10 mg by mouth daily at bedtime, Starting Sun 4/15/2018, Historical Med      busPIRone (BUSPAR) 10 mg tablet Take by mouth, Historical Med      dicyclomine (BENTYL) 10 mg capsule Take 2 capsules (20 mg total) by mouth 3 (three) times a day as needed (naseau, abdominal pain), Starting u 5/23/2019, Normal fluticasone (FLONASE) 50 mcg/act nasal spray 2 sprays into each nostril, Starting Fri 1/6/2017, Historical Med      lamoTRIgine (LaMICtal) 100 mg tablet Take 100 mg by mouth every morning, Starting Sun 4/15/2018, Historical Med      mirtazapine (REMERON) 30 mg tablet TAKE 1 TABLET POR VIA ORAL ANTES DE DORMIR EN LA NOCHE, Historical Med      montelukast (SINGULAIR) 10 mg tablet Take 1 tablet (10 mg total) by mouth daily, Starting u 5/23/2019, Normal      !! ibuprofen (MOTRIN) 600 mg tablet Take 1 tablet (600 mg total) by mouth every 8 (eight) hours as needed for fever or headaches, Starting u 5/23/2019, Normal       !! - Potential duplicate medications found  Please discuss with provider  No discharge procedures on file  ED Provider  Attending physically available and evaluated Flora Arroyo  HERMAN managed the patient along with the ED Attending      Electronically Signed by DO Barry Balderrama DO  07/14/19 0818

## 2019-07-14 NOTE — ED ATTENDING ATTESTATION
Princess Wagner MD, saw and evaluated the patient  I have discussed the patient with the resident/non-physician practitioner and agree with the resident's/non-physician practitioner's findings, Plan of Care, and MDM as documented in the resident's/non-physician practitioner's note, except where noted  All available labs and Radiology studies were reviewed  I was present for key portions of any procedure(s) performed by the resident/non-physician practitioner and I was immediately available to provide assistance  At this point I agree with the current assessment done in the Emergency Department  I have conducted an independent evaluation of this patient a history and physical is as follows: This is a 28 y o  old female who presents to the ED for evaluation of Left-sided neck pain  Patient states last month she has had pain on the left side of her neck  Diagnosis musculoskeletal pain by her PCP  Has been using ibuprofen for but recently ran out  No numbness or tingling in her hand  No neurologic symptoms  No yoga or chiropractor  VS and nursing notes reviewed  General: Appears in NAD, awake, alert, speaking normally in full sentences  Well-nourished, well-developed  Appears stated age  Head: Normocephalic, atraumatic  Eyes: EOMI  Vision grossly normal  No subconjunctival hemorrhages or occular discharge noted  Symmetrical lids  ENT: Atraumatic external nose and ears  No stridor  Normal phonation  No drooling  Normal swallowing  Neck: No JVD  FROM  No goiter  Tender over the L trapezius  CV: No pallor  Normal rate  Lungs: No tachypnea  No respiratory distress  MSK: Moving all extremities equally, no peripheral edema  Skin: Dry, intact  No cyanosis  Neuro: Awake, alert, GCS15  CN II-XII grossly intact  Grossly normal gait  Psychiatric/Behavioral: Appropriate mood and affect  A/P: This is a 28 y o  female who presents to the ED for evaluation of neck pain    Musculoskeletal  Ibuprofen, Robaxin, primary care follow-up       Critical Care Time  Procedures

## 2019-07-16 DIAGNOSIS — J45.20 MILD INTERMITTENT ASTHMA WITHOUT COMPLICATION: ICD-10-CM

## 2019-07-17 RX ORDER — MONTELUKAST SODIUM 10 MG/1
TABLET ORAL
Qty: 180 TABLET | OUTPATIENT
Start: 2019-07-17

## 2019-07-18 ENCOUNTER — OFFICE VISIT (OUTPATIENT)
Dept: INTERNAL MEDICINE CLINIC | Facility: CLINIC | Age: 34
End: 2019-07-18

## 2019-07-18 VITALS
DIASTOLIC BLOOD PRESSURE: 82 MMHG | HEART RATE: 94 BPM | SYSTOLIC BLOOD PRESSURE: 122 MMHG | WEIGHT: 158.51 LBS | TEMPERATURE: 98.8 F | HEIGHT: 63 IN | BODY MASS INDEX: 28.09 KG/M2

## 2019-07-18 DIAGNOSIS — M54.2 NECK PAIN ON LEFT SIDE: Primary | ICD-10-CM

## 2019-07-18 PROCEDURE — 99215 OFFICE O/P EST HI 40 MIN: CPT | Performed by: INTERNAL MEDICINE

## 2019-07-18 RX ORDER — PREDNISONE 20 MG/1
40 TABLET ORAL DAILY
Qty: 10 TABLET | Refills: 0 | Status: SHIPPED | OUTPATIENT
Start: 2019-07-18 | End: 2019-07-28

## 2019-07-18 RX ORDER — CYCLOBENZAPRINE HCL 10 MG
10 TABLET ORAL
Qty: 15 TABLET | Refills: 1 | Status: SHIPPED | OUTPATIENT
Start: 2019-07-18 | End: 2020-10-21 | Stop reason: ALTCHOICE

## 2019-07-18 RX ORDER — AMOXICILLIN AND CLAVULANATE POTASSIUM 875; 125 MG/1; MG/1
1 TABLET, FILM COATED ORAL EVERY 12 HOURS SCHEDULED
Qty: 20 TABLET | Refills: 0 | Status: SHIPPED | OUTPATIENT
Start: 2019-07-18 | End: 2019-07-28

## 2019-07-18 RX ORDER — DULOXETIN HYDROCHLORIDE 30 MG/1
60 CAPSULE, DELAYED RELEASE ORAL
Refills: 0 | COMMUNITY
Start: 2019-07-17

## 2019-07-18 NOTE — PATIENT INSTRUCTIONS
Please take following medications Augmentin, prednisone, Flexeril, ibuprofen p r n  As prescribed  If symptoms worsen please call, follow-up in 2 weeks  Neck Pain   WHAT YOU NEED TO KNOW:   You may have sudden neck pain that increases quickly  You may instead feel pain build slowly over time  Neck pain may go away in a few days or weeks, or it may continue for months  The pain may come and go, or be worse with certain movements  The pain may be only in your neck, or it may move to your arms, back, or shoulders  You may also have pain that starts in another body area and moves to your neck  Some types of neck pain are permanent  DISCHARGE INSTRUCTIONS:   Return to the emergency department if:   · You have an injury that causes neck pain and shooting pain down your arms or legs  · Your neck pain suddenly becomes severe  · You have neck pain along with numbness, tingling, or weakness in your arms or legs  · You have a stiff neck, a headache, and a fever  Contact your healthcare provider if:   · You have new or worsening symptoms  · Your symptoms continue even after treatment  · You have questions or concerns about your condition or care  Medicines: You may need any of the following:  · NSAIDs  , such as ibuprofen, help decrease swelling, pain, and fever  This medicine is available without a doctor's order  Ask your healthcare provider which medicine to take and how often to take it  Follow directions  NSAIDs can cause stomach bleeding or kidney problems if not taken correctly  If you take blood thinner medicine, always ask if NSAIDs are safe for you  · Acetaminophen  helps decrease pain and fever  Ask your healthcare provider how much to take and how often to take it  Follow directions  Acetaminophen can cause liver damage if not taken correctly  · Steroid medicine  may be used to reduce inflammation  This can help relieve pain caused by swelling  · Take your medicine as directed  Contact your healthcare provider if you think your medicine is not helping or if you have side effects  Tell him or her if you are allergic to any medicine  Keep a list of the medicines, vitamins, and herbs you take  Include the amounts, and when and why you take them  Bring the list or the pill bottles to follow-up visits  Carry your medicine list with you in case of an emergency  Manage or prevent neck pain:   · Rest your neck as directed  Do not make sudden movements, such as turning your head quickly  Your healthcare provider may recommend you wear a cervical collar for a short time  The collar will prevent you from moving your head  This will give your neck time to heal if an injury is causing your neck pain  Ask your healthcare provider when you can return to sports or other normal daily activities  · Apply heat as directed  Heat helps relieve pain and swelling  Use a heat wrap, or soak a small towel in warm water  Wring out the extra water  Apply the heat wrap or towel for 20 minutes every hour, or as directed  · Apply ice as directed  Ice helps relieve pain and swelling, and can help prevent tissue damage  Use an ice pack, or put ice in a bag  Cover the ice pack or back with a towel before you apply it to your neck  Apply the ice pack or ice for 15 minutes every hour, or as directed  Your healthcare provider can tell you how often to apply ice  · Do neck exercises as directed  Neck exercises help strengthen the muscles and increase range of motion  Your healthcare provider will tell you which exercises are right for you  He may give you instructions, or he may recommend that you work with a physical therapist  Your healthcare provider or therapist can make sure you are doing the exercises correctly  · Maintain good posture  Try to keep your head and shoulders lifted when you sit  If you work in front of a computer, make sure the monitor is at the right level   You should not need to look up down to see the screen  You should also not have to lean forward to be able to read what is on the screen  Make sure your keyboard, mouse, and other computer items are placed where you do not have to extend your shoulder to reach them  Get up often if you work in front of a computer or sit for long periods of time  Stretch or walk around to keep your neck muscles loose  Follow up with your healthcare provider as directed: Your healthcare provider may refer you to a specialist if your pain does not get better with treatment  Write down your questions so you remember to ask them during your visits  © 2017 [employer]  Information is for End User's use only and may not be sold, redistributed or otherwise used for commercial purposes  All illustrations and images included in CareNotes® are the copyrighted property of [employer]  or [employer]  The above information is an  only  It is not intended as medical advice for individual conditions or treatments  Talk to your doctor, nurse or pharmacist before following any medical regimen to see if it is safe and effective for you  Sinusitis   WHAT YOU NEED TO KNOW:   Sinusitis is inflammation or infection of your sinuses  It is most often caused by a virus  Acute sinusitis may last up to 12 weeks  Chronic sinusitis lasts longer than 12 weeks  Recurrent sinusitis means you have 4 or more times in 1 year  DISCHARGE INSTRUCTIONS:   Return to the emergency department if:   · Your eye and eyelid are red, swollen, and painful  · You cannot open your eye  · You have vision changes, such as double vision  · Your eyeball bulges out or you cannot move your eye  · You are more sleepy than normal, or you notice changes in your ability to think, move, or talk  · You have a stiff neck, a fever, or a bad headache  · You have swelling of your forehead or scalp    Contact your healthcare provider if:   · Your symptoms do not improve after 3 days  · Your symptoms do not go away after 10 days  · You have nausea and are vomiting  · Your nose is bleeding  · You have questions or concerns about your condition or care  Medicines: Your symptoms may go away on their own  Your healthcare provider may recommend watchful waiting for up to 10 days before starting antibiotics  You may  need any of the following:  · Acetaminophen  decreases pain and fever  It is available without a doctor's order  Ask how much to take and how often to take it  Follow directions  Read the labels of all other medicines you are using to see if they also contain acetaminophen, or ask your doctor or pharmacist  Acetaminophen can cause liver damage if not taken correctly  Do not use more than 4 grams (4,000 milligrams) total of acetaminophen in one day  · NSAIDs , such as ibuprofen, help decrease swelling, pain, and fever  This medicine is available with or without a doctor's order  NSAIDs can cause stomach bleeding or kidney problems in certain people  If you take blood thinner medicine, always ask your healthcare provider if NSAIDs are safe for you  Always read the medicine label and follow directions  · Nasal steroid sprays  may help decrease inflammation in your nose and sinuses  · Decongestants  help reduce swelling and drain mucus in the nose and sinuses  They may help you breathe easier  · Antihistamines  help dry mucus in the nose and relieve sneezing  · Antibiotics  help treat or prevent a bacterial infection  · Take your medicine as directed  Contact your healthcare provider if you think your medicine is not helping or if you have side effects  Tell him or her if you are allergic to any medicine  Keep a list of the medicines, vitamins, and herbs you take  Include the amounts, and when and why you take them  Bring the list or the pill bottles to follow-up visits   Carry your medicine list with you in case of an emergency  Self-care:   · Rinse your sinuses  Use a sinus rinse device to rinse your nasal passages with a saline (salt water) solution or distilled water  Do not use tap water  This will help thin the mucus in your nose and rinse away pollen and dirt  It will also help reduce swelling so you can breathe normally  Ask your healthcare provider how often to do this  · Breathe in steam   Heat a bowl of water until you see steam  Lean over the bowl and make a tent over your head with a large towel  Breathe deeply for about 20 minutes  Be careful not to get too close to the steam or burn yourself  Do this 3 times a day  You can also breathe deeply when you take a hot shower  · Sleep with your head elevated  Place an extra pillow under your head before you go to sleep to help your sinuses drain  · Drink liquids as directed  Ask your healthcare provider how much liquid to drink each day and which liquids are best for you  Liquids will thin the mucus in your nose and help it drain  Avoid drinks that contain alcohol or caffeine  · Do not smoke, and avoid secondhand smoke  Nicotine and other chemicals in cigarettes and cigars can make your symptoms worse  Ask your healthcare provider for information if you currently smoke and need help to quit  E-cigarettes or smokeless tobacco still contain nicotine  Talk to your healthcare provider before you use these products  Prevent the spread of germs that cause sinusitis:  Wash your hands often with soap and water  Wash your hands after you use the bathroom, change a child's diaper, or sneeze  Wash your hands before you prepare or eat food  Follow up with your healthcare provider as directed: You may be referred to an ear, nose, and throat specialist  Write down your questions so you remember to ask them during your visits     © 2017 [facility] Information is for End User's use only and may not be sold, redistributed or otherwise used for commercial purposes  All illustrations and images included in CareNotes® are the copyrighted property of [employer]  or [employer]  The above information is an  only  It is not intended as medical advice for individual conditions or treatments  Talk to your doctor, nurse or pharmacist before following any medical regimen to see if it is safe and effective for you

## 2019-07-18 NOTE — PROGRESS NOTES
[facility] Visit Note  Leandra Fortune 28 y o  female   MRN: [id number]    Assessment and Plan      Neck pain on left side  follow-up emergency department visit on 07/14/2019, has been taking ibuprofen p r n  that has been giving her mild relief  Patient presentation gives differential of migraine versus tension headache versus sinusitis versus mastoiditis  Pain is most notable on occipital/trapezius insertion point, likely musculoskeletal in nature  Admits to left-sided facial pain, with bulging of left tympanic membrane noted with mild erythema  Patient is a poor historian hard to pin down specifics in medical history  Plan:  -Augmentin p o  B i d  for 10 days  -Flexeril 10 mg q h s   -prednisone 40 mg p o  q day for 10 days  -ibuprofen p r n  for pain, as tolerated with meals  -follow-up if symptoms persist, 2 week follow-up  -will consider PT referral at next visit      Schedule a follow-up appointment in 2 weeks  Chief Complaint:  Left sided neck pain  Subjective     History of Present Illness:  69-year-old female with a past medical history of anxiety, depression, dysrhythmic disorder, mild intermittent asthma, menorrhagia presents to office for ED follow-up  Patient was in ED on 07/14/2019 for left-sided neck and facial pain with associated headache  Was given ibuprofen at time of visit with mild improvement  Patient works in an assembly line weighing and packing candy, repetitive motion at work  Pain is most notable in left side of neck with associated facial and shoulder pain  Denies any chest pain, shortness of breath, diaphoresis, no fever  Admits to associated headache, chills intermittently, neck pain, neck stiffness  Review of Systems   Constitutional: Positive for chills  Negative for activity change, appetite change, diaphoresis and fever  HENT: Positive for ear pain  Negative for facial swelling, sinus pain and sore throat      Eyes: Positive for pain and discharge  Negative for itching  Respiratory: Negative for cough and shortness of breath  Cardiovascular: Negative for chest pain and palpitations  Gastrointestinal: Negative for abdominal distention and abdominal pain  Genitourinary: Negative for dysuria, hematuria and pelvic pain  Musculoskeletal: Positive for neck pain and neck stiffness  Skin: Negative for color change and pallor  Neurological: Positive for headaches  Negative for dizziness and light-headedness  Psychiatric/Behavioral: Negative for agitation and behavioral problems  Current Outpatient Medications:     albuterol (PROVENTIL HFA,VENTOLIN HFA) 90 mcg/act inhaler, INHALE 2 PUFFS POR VIA ORAL CADA 6 HORAS, Disp: 36 g, Rfl: 0    ARIPiprazole (ABILIFY) 10 mg tablet, Take 10 mg by mouth daily at bedtime, Disp: , Rfl: 0    dicyclomine (BENTYL) 10 mg capsule, Take 2 capsules (20 mg total) by mouth 3 (three) times a day as needed ([place], abdominal pain), Disp: 90 capsule, Rfl: 0    DULoxetine (CYMBALTA) 30 mg delayed release capsule, TOME ELIZABETH CAPSULA POR VIA ORAL EN LA MA? GOSIA, Disp: , Rfl: 0    fluticasone (FLONASE) 50 mcg/act nasal spray, 2 sprays into each nostril, Disp: , Rfl:     ibuprofen (MOTRIN) 400 mg tablet, Take 1 tablet (400 mg total) by mouth every 8 (eight) hours as needed for mild pain for up to 15 days, Disp: 45 tablet, Rfl: 0    lamoTRIgine (LaMICtal) 100 mg tablet, Take 100 mg by mouth every morning, Disp: , Rfl: 0    montelukast (SINGULAIR) 10 mg tablet, Take 1 tablet (10 mg total) by mouth daily, Disp: 90 tablet, Rfl: 1    busPIRone (BUSPAR) 10 mg tablet, Take by mouth, Disp: , Rfl:     ibuprofen (MOTRIN) 600 mg tablet, Take 1 tablet (600 mg total) by mouth every 8 (eight) hours as needed for fever or headaches (Patient not taking: Reported on 7/18/2019), Disp: 60 tablet, Rfl: 0    mirtazapine (REMERON) 30 mg tablet, TAKE 1 TABLET POR VIA ORAL ANTES DE DORMIR EN LA NOCHE, Disp: , Rfl: 0  Past Medical History:   Diagnosis Date    Asthma     Chlamydia infection     Depression     IBS (irritable bowel syndrome)     Migraine     Varicose veins of both lower extremities      Past Surgical History:   Procedure Laterality Date    COLPOSCOPY       Social History     Socioeconomic History    Marital status: Single     Spouse name: Not on file    Number of children: Not on file    Years of education: Not on file    Highest education level: Not on file   Occupational History    Not on file   Social Needs    Financial resource strain: Not on file    Food insecurity:     Worry: Not on file     Inability: Not on file    Transportation needs:     Medical: Not on file     Non-medical: Not on file   Tobacco Use    Smoking status: Current Every Day Smoker     Packs/day: 1 00     Years: 15 00     Pack years: 15 00    Smokeless tobacco: Never Used   Substance and Sexual Activity    Alcohol use: Yes     Comment: social    Drug use: Never    Sexual activity: Yes     Partners: Male     Birth control/protection: None     Comment: trying to have a baby   Lifestyle    Physical activity:     Days per week: Not on file     Minutes per session: Not on file    Stress: Not on file   Relationships    Social connections:     Talks on phone: Not on file     Gets together: Not on file     Attends Muslim service: Not on file     Active member of club or organization: Not on file     Attends meetings of clubs or organizations: Not on file     Relationship status: Not on file    Intimate partner violence:     Fear of current or ex partner: Not on file     Emotionally abused: Not on file     Physically abused: Not on file     Forced sexual activity: Not on file   Other Topics Concern    Not on file   Social History Narrative    Not on file     Family History   Problem Relation Age of Onset    Diabetes Brother     No Known Problems Mother     Cirrhosis Father     No Known Problems Sister    Dung Romo Autism Son    [facility] No Known Problems Brother     Depression Son      No Known Allergies    Objective     Vitals:    07/18/19 1000   BP: 122/82   BP Location: Left arm   Patient Position: Sitting   Cuff Size: Adult   Pulse: 94   Temp: 98 8 °F (37 1 °C)   TempSrc: Oral   Weight: 71 9 kg (158 lb 8 2 oz)   Height: 5' 3" (1 6 m)       Physical exam:     GENERAL: NAD   HEENT:  NC/AT, PERRL, EOMI, no scleral icterus, bulging of left tympanic membrane noted with mild erythema  CARDIAC:  RRR, +S1/S2, no S3/S4 heard, no m/g/r  PULMONARY:  CTA B/L, no wheezing/rales/rhonci, non-labored breathing  ABDOMEN:  Soft, NT/ND,no rebound/guarding/rigidity  Extremities:  No edema, cyanosis, or clubbing  NEUROLOGIC: Grossly intact  MSK:  5/5 muscular strength upper extremities bilaterally, pinpoint tenderness on left occipital/trapezius insertion point, mild tenderness to palpation left shoulder  SKIN:  No rashes or erythema noted on exposed skin  Psych: Normal affect    DO Paula Haynes [facility] Internal Medicine PGY-1    [address] [facility] , [address] [address] [facility], [address] [place] [address]  Office: [phone]  Fax: [phone]     ==  PLEASE NOTE:  This encounter was completed utilizing the [employer] Direct Speech Voice Recognition Software  Grammatical errors, random word insertions, pronoun errors and incomplete sentences are occasional consequences of the system due to software limitations, ambient noise and hardware issues  These may be missed by proof reading prior to affixing electronic signature  Any questions or concerns about the content, text or information contained within the body of this dictation should be directly addressed to the physician for clarification  Please do not hesitate to call me directly if you have any any questions or concerns

## 2019-07-31 DIAGNOSIS — J45.20 MILD INTERMITTENT ASTHMA WITHOUT COMPLICATION: Primary | ICD-10-CM

## 2019-07-31 RX ORDER — PEN NEEDLE, DIABETIC 32GX 5/32"
NEEDLE, DISPOSABLE MISCELLANEOUS
Qty: 1 EACH | Refills: 0 | Status: SHIPPED | OUTPATIENT
Start: 2019-07-31

## 2019-08-06 RX ORDER — ARIPIPRAZOLE 15 MG/1
TABLET ORAL
Refills: 0 | COMMUNITY
Start: 2019-07-23 | End: 2020-10-21 | Stop reason: ALTCHOICE

## 2019-08-07 ENCOUNTER — OFFICE VISIT (OUTPATIENT)
Dept: INTERNAL MEDICINE CLINIC | Facility: CLINIC | Age: 34
End: 2019-08-07

## 2019-08-07 VITALS
DIASTOLIC BLOOD PRESSURE: 60 MMHG | HEIGHT: 63 IN | TEMPERATURE: 97.9 F | BODY MASS INDEX: 27.73 KG/M2 | WEIGHT: 156.53 LBS | HEART RATE: 72 BPM | SYSTOLIC BLOOD PRESSURE: 92 MMHG

## 2019-08-07 DIAGNOSIS — R10.13 EPIGASTRIC PAIN: ICD-10-CM

## 2019-08-07 DIAGNOSIS — J31.0 CHRONIC RHINITIS: ICD-10-CM

## 2019-08-07 DIAGNOSIS — R11.0 NAUSEA: ICD-10-CM

## 2019-08-07 DIAGNOSIS — G43.909 MIGRAINE: ICD-10-CM

## 2019-08-07 DIAGNOSIS — M75.82 ROTATOR CUFF TENDONITIS, LEFT: Primary | ICD-10-CM

## 2019-08-07 PROCEDURE — 3008F BODY MASS INDEX DOCD: CPT | Performed by: INTERNAL MEDICINE

## 2019-08-07 PROCEDURE — 99214 OFFICE O/P EST MOD 30 MIN: CPT | Performed by: INTERNAL MEDICINE

## 2019-08-07 RX ORDER — METHOCARBAMOL 750 MG/1
750 TABLET, FILM COATED ORAL EVERY 6 HOURS PRN
Qty: 120 TABLET | Refills: 0 | Status: SHIPPED | OUTPATIENT
Start: 2019-08-07 | End: 2019-09-06

## 2019-08-07 RX ORDER — PROPRANOLOL HCL 60 MG
60 CAPSULE, EXTENDED RELEASE 24HR ORAL DAILY
Qty: 90 CAPSULE | Refills: 0 | Status: SHIPPED | OUTPATIENT
Start: 2019-08-07 | End: 2019-11-04 | Stop reason: SDUPTHER

## 2019-08-07 RX ORDER — DICYCLOMINE HYDROCHLORIDE 10 MG/1
20 CAPSULE ORAL 3 TIMES DAILY PRN
Qty: 90 CAPSULE | Refills: 0 | Status: SHIPPED | OUTPATIENT
Start: 2019-08-07 | End: 2020-09-14 | Stop reason: SDUPTHER

## 2019-08-07 RX ORDER — NAPROXEN 500 MG/1
500 TABLET ORAL 2 TIMES DAILY WITH MEALS
Qty: 180 TABLET | Refills: 0 | Status: SHIPPED | OUTPATIENT
Start: 2019-08-07 | End: 2019-11-04 | Stop reason: SDUPTHER

## 2019-08-07 RX ORDER — FLUTICASONE PROPIONATE 50 MCG
2 SPRAY, SUSPENSION (ML) NASAL CONTINUOUS PRN
Qty: 2 BOTTLE | Refills: 0 | Status: SHIPPED | OUTPATIENT
Start: 2019-08-07

## 2019-08-07 NOTE — PROGRESS NOTES
[facility]  INTERNAL MEDICINE ACUTE VISIT     PATIENT INFORMATION     Yamila Hagen   27 y o  female   MRN: [id number]    ASSESSMENT/PLAN     Pt is a 28yo F, of  [place], who presents for a f/u visit for her ear pain and acute complaint of left shoulder pain and HA  Acute Rotator Cuff Tendonitis vs Bursitis of Left shoulder  Possibly complicated with supraspinatous impingement   -Pain started about 1 month ago  Exacerbated with movement although FROM in tact on passive manipulation   R shoulder without complaint   -Pain elicited on abduction and + Dela Cruz test  -pt began working at [employer] in February 2019 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)  -Pain mainly in shoulder, and extends into left sided neck; minimal to no tingling into fingers; affecting pt's sleep  -pain mildly alleviated with Ibuprofen  -Educated on minimiziny heavy duty work, Warm compresses, stretching exercises  -Naproxen 600 mg PRN, taken w/ food   -Methocarbamol 750mg for 30 days to aid with sleep as well as relieve pain while at work   -Physical therapy referral; pt amendable  -Orthopedic Referral; pt amendable for possible steroid injection if pain worsens  -return if pain worsens; consider imaging at later time     Migraine Headaches  Possibly complicated with Tension type HA as pt admits to elevated stress levels, anxiety and depression  -Chronic; pt admits to taking medications "years" ago and stopped  -admits migraines occur several times/week, last >2-3hours at a time  -Pain is frontotemporal and occipital; pressure like pain  -associated with nausea, blurred vision, sinus pain, and exacerbated with light  -Taking Ibuprofen for abortive therapy; mild relief  -Pt states she would like better medication for symptom control  -start Propranolol 60mg daily   -Start Vitamin B2 (Riboflavin) 400mg daily  -Start Naproxen 600 mg Q6 PRN for mild pain  -Reassess in 3 months; return if worsening; Consider Triptan if inadequate relief    Left Ear Pain; likely Otitis Media based on previous documentation; Resolved  -completed 10day course of Prednisone and Augmentin   -Cone of light visualized on grey TM B/L, without erythema, without bulging   -Mild cervical adenopathy palpated anteriorly; tender to palpation    Maintenance Evaluation:    Depression/ dysthymia; stable  -Mood is "okay" today  -Depression screening score 0  -Cymbalta 30mg  -Lamotrigine 100mg daily  -Abilify 15mg every night    Irregular Menses/ menorrhagia  -Last seen OBGYN in June 2019; advised to use condoms; refused OCP at time  -Will have pt return for another visit regarding possible PCOS workup (Hirsutism noted on chin, overweight BMI 27 7)  -encouraged weight loss     Intermittent Asthma: stable  -albuterol pump PRN adequate for symptoms (usually only 1x/week, no night symptoms)    CHIEF COMPLAINT     Chief Complaint   Patient presents with    Shoulder Pain     here to follow up on her left shoulder pain patient states she has been feeling better but continues to have pain      HISTORY OF PRESENT ILLNESS     Pt is a 30yo F, of  [place], who presents for a f/u visit regarding left ear pain as well as acute complaint for her left shoulder pain and HA  Pt has a significant PMH of depression, anxiety, irregular menses/ menorrhagia, and intermittent asthma  Regarding her left ear pain, pt completed 10day course of prednisone  And augmentin  Ear pain resolved  Mild sore throat and slight non productive cough  Denies fevers, chills  Admits sinuses feel better and needs refills for flonase and Bentyl  Her acute complaint of Left shoulder pain extending to her left sided neck began in June this year  Pain is 5/10 today  States that pain is constant ache and worse with movement roger overhead  Pt began working in [employer] (physical duty with using arms) in February this year  Denies issues with right shoulder  Denies trauma, MVA  Pt also complains of migraine HA which are ocuring more frequently  Pt has had migrains in past  Driscilla Showers admits to taking medications "years" ago and stopped  States that migraines occur several times/week, last >2-3hours at a time  Pain is frontotemporal and occipital; pressure like pain associated with nausea, blurred vision, sinus pain, and exacerbated with light  Taking Ibuprofen for abortive therapy; mild relief  Pt states she would like better medication for symptom control  Also discussed chronic issues including depression and menorrhagia  States mood is "okay" and well controlled with cymbalta, lamotrigine and ability  Seeing psych  Regarding menses, pt appears to have features of PCOS like Hirsutism noted on chin, overweight BMI 27 7  Seen OBGYN in [place] this year and refused OCPs at last visit  Will readdress during future visit s      REVIEW OF SYSTEMS     Review of Systems   Constitutional: Positive for fatigue  Negative for activity change, appetite change, chills, diaphoresis and fever  HENT: Positive for sore throat  Negative for congestion, ear pain, facial swelling, sinus pain and tinnitus  Eyes: Negative for pain  Respiratory: Positive for cough  Negative for apnea, choking, chest tightness, shortness of breath and stridor  Cardiovascular: Negative  Negative for chest pain, palpitations and leg swelling  Gastrointestinal: Negative  Endocrine: Negative for cold intolerance and heat intolerance  Genitourinary: Negative  Musculoskeletal: Positive for myalgias and neck pain  Negative for back pain, gait problem and joint swelling  Left shoulder pain with movement, pain extending into left neck   Skin: Negative  Allergic/Immunologic: Negative  Neurological: Negative  Hematological: Negative  Psychiatric/Behavioral: Negative        OBJECTIVE     Vitals:    08/07/19 1300   BP: 92/60   BP Location: Right arm   Patient Position: Sitting   Cuff Size: Adult Pulse: 72   Temp: 97 9 °F (36 6 °C)   TempSrc: Oral   Weight: 71 kg (156 lb 8 4 oz)   Height: 5' 3" (1 6 m)     Physical Exam   Constitutional: She is oriented to person, place, and time  She appears well-developed and well-nourished  No distress  HENT:   Head: Normocephalic and atraumatic  Right Ear: External ear normal    Left Ear: External ear normal    Nose: Nose normal    Mild oropharyngeal erythema; without exudates   Eyes: Pupils are equal, round, and reactive to light  Conjunctivae and EOM are normal    Neck: Normal range of motion  Neck supple  Tracheal deviation present  No thyromegaly present  Cardiovascular: Normal rate, regular rhythm, normal heart sounds and intact distal pulses  Exam reveals no gallop and no friction rub  No murmur heard  Pulmonary/Chest: Effort normal  No stridor  No respiratory distress  She has no wheezes  She has no rales  She exhibits no tenderness  Musculoskeletal: She exhibits tenderness  She exhibits no edema or deformity  Left shoulder tenderness to palpation and on active manipulation  Passive manipulation tolerable and full range of motion  Right shoulder exam unremarkable  Neurological: She is alert and oriented to person, place, and time  No cranial nerve deficit  Coordination normal    Skin: Skin is warm and dry  Capillary refill takes less than 2 seconds  No rash noted  No erythema  No pallor  Psychiatric: Her behavior is normal  Judgment and thought content normal    Flat affect; blunted expressions   Nursing note and vitals reviewed      CURRENT MEDICATIONS     Current Outpatient Medications:     albuterol (PROVENTIL HFA,VENTOLIN HFA) 90 mcg/act inhaler, INHALE 2 PUFFS POR VIA ORAL CADA 6 HORAS, Disp: 36 g, Rfl: 0    ARIPiprazole (ABILIFY) 15 mg tablet, TAKE 1 TABLET POR VIA ORAL ANTES DE DORMIR EN LA NOCHE, Disp: , Rfl: 0    cyclobenzaprine (FLEXERIL) 10 mg tablet, Take 1 tablet (10 mg total) by mouth daily at bedtime, Disp: 15 tablet, Rfl: 1   dicyclomine (BENTYL) 10 mg capsule, Take 2 capsules (20 mg total) by mouth 3 (three) times a day as needed (nase[place], abdominal pain), Disp: 90 capsule, Rfl: 0    DULoxetine (CYMBALTA) 30 mg delayed release capsule, TOME ELIZABETH CAPSULA POR VIA ORAL EN LA MA? OGSIA, Disp: , Rfl: 0    fluticasone (FLONASE) 50 mcg/act nasal spray, 2 sprays into each nostril continuous as needed for rhinitis, Disp: 2 Bottle, Rfl: 0    ibuprofen (MOTRIN) 600 mg tablet, Take 1 tablet (600 mg total) by mouth every 8 (eight) hours as needed for fever or headaches, Disp: 60 tablet, Rfl: 0    lamoTRIgine (LaMICtal) 100 mg tablet, Take 100 mg by mouth every morning, Disp: , Rfl: 0    mirtazapine (REMERON) 30 mg tablet, TAKE 1 TABLET POR VIA ORAL ANTES DE DORMIR EN LA NOCHE, Disp: , Rfl: 0    montelukast (SINGULAIR) 10 mg tablet, Take 1 tablet (10 mg total) by mouth daily, Disp: 90 tablet, Rfl: 1    Spacer/Aero-Holding Chambers (PRO COMFORT SPACER ADULT) MISC, USE AS DIRECTED, Disp: 1 each, Rfl: 0    busPIRone (BUSPAR) 10 mg tablet, Take by mouth, Disp: , Rfl:     ibuprofen (MOTRIN) 400 mg tablet, Take 1 tablet (400 mg total) by mouth every 8 (eight) hours as needed for mild pain for up to 15 days, Disp: 45 tablet, Rfl: 0    methocarbamol (ROBAXIN) 750 mg tablet, Take 1 tablet (750 mg total) by mouth every 6 (six) hours as needed for muscle spasms for up to 30 days, Disp: 120 tablet, Rfl: 0    naproxen (EC NAPROSYN) 500 MG EC tablet, Take 1 tablet (500 mg total) by mouth 2 (two) times a day with meals, Disp: 180 tablet, Rfl: 0    propranolol (INDERAL LA) 60 mg 24 hr capsule, Take 1 capsule (60 mg total) by mouth daily, Disp: 90 capsule, Rfl: 0    Riboflavin (VITAMIN B-2) 100 MG TABS, 100mg tablets; 4x/day, Disp: 360 each, Rfl: 0    PAST MEDICAL & SURGICAL HISTORY     Past Medical History:   Diagnosis Date    Asthma     Chlamydia infection     Depression     IBS (irritable bowel syndrome)     Migraine     Varicose veins of both lower extremities      Past Surgical History:   Procedure Laterality Date    COLPOSCOPY       SOCIAL & FAMILY HISTORY     Social History     Socioeconomic History    Marital status: Single     Spouse name: Not on file    Number of children: Not on file    Years of education: Not on file    Highest education level: Not on file   Occupational History    Not on file   Social Needs    Financial resource strain: Not on file    Food insecurity:     Worry: Not on file     Inability: Not on file    Transportation needs:     Medical: Not on file     Non-medical: Not on file   Tobacco Use    Smoking status: Current Every Day Smoker     Packs/day: 1 00     Years: 15 00     Pack years: 15 00    Smokeless tobacco: Never Used   Substance and Sexual Activity    Alcohol use: Yes     Comment: social    Drug use: Never    Sexual activity: Yes     Partners: Male     Birth control/protection: None     Comment: trying to have a baby   Lifestyle    Physical activity:     Days per week: Not on file     Minutes per session: Not on file    Stress: Not on file   Relationships    Social connections:     Talks on phone: Not on file     Gets together: Not on file     Attends [employer] service: Not on file     Active member of club or organization: Not on file     Attends meetings of clubs or organizations: Not on file     Relationship status: Not on file    Intimate partner violence:     Fear of current or ex partner: Not on file     Emotionally abused: Not on file     Physically abused: Not on file     Forced sexual activity: Not on file   Other Topics Concern    Not on file   Social History Narrative    Not on file     Social History     Substance and Sexual Activity   Alcohol Use Yes    Comment: social     Social History     Substance and Sexual Activity   Drug Use Never     Social History     Tobacco Use   Smoking Status Current Every Day Smoker    Packs/day: 1 00    Years: 15 00    Pack years: 15 00   Smokeless Tobacco Never Used     Family History   Problem Relation Age of Onset    Diabetes Brother     No Known Problems Mother     Cirrhosis Father     No Known Problems Sister     Autism Son     No Known Problems Brother     Depression Son      ==  Jose L Flores MD  Resident, PGY-1  [facility] [address]   [facility] , [address] [address] [facility] [address], [address] [facility]  Office: [phone]  Fax: [phone]

## 2019-08-12 DIAGNOSIS — J45.20 MILD INTERMITTENT ASTHMA WITHOUT COMPLICATION: ICD-10-CM

## 2019-08-13 RX ORDER — ALBUTEROL SULFATE 90 UG/1
AEROSOL, METERED RESPIRATORY (INHALATION)
Qty: 36 G | Refills: 0 | Status: SHIPPED | OUTPATIENT
Start: 2019-08-13 | End: 2020-09-14 | Stop reason: SDUPTHER

## 2019-08-13 RX ORDER — MONTELUKAST SODIUM 10 MG/1
TABLET ORAL
Qty: 180 TABLET | Refills: 0 | Status: SHIPPED | OUTPATIENT
Start: 2019-08-13 | End: 2020-01-17

## 2019-08-26 DIAGNOSIS — J45.20 MILD INTERMITTENT ASTHMA WITHOUT COMPLICATION: ICD-10-CM

## 2019-08-28 RX ORDER — PEN NEEDLE, DIABETIC 32GX 5/32"
NEEDLE, DISPOSABLE MISCELLANEOUS
Qty: 1 EACH | Refills: 0 | OUTPATIENT
Start: 2019-08-28

## 2019-10-23 DIAGNOSIS — G43.909 MIGRAINE: ICD-10-CM

## 2019-10-23 DIAGNOSIS — M75.82 ROTATOR CUFF TENDONITIS, LEFT: ICD-10-CM

## 2019-11-04 DIAGNOSIS — M75.82 ROTATOR CUFF TENDONITIS, LEFT: ICD-10-CM

## 2019-11-04 DIAGNOSIS — G43.909 MIGRAINE: ICD-10-CM

## 2019-11-04 RX ORDER — PROPRANOLOL HCL 60 MG
60 CAPSULE, EXTENDED RELEASE 24HR ORAL DAILY
Qty: 90 CAPSULE | Refills: 0 | Status: SHIPPED | OUTPATIENT
Start: 2019-11-04 | End: 2020-03-31

## 2019-11-04 RX ORDER — NAPROXEN 500 MG/1
500 TABLET ORAL 2 TIMES DAILY WITH MEALS
Qty: 180 TABLET | Refills: 0 | Status: SHIPPED | OUTPATIENT
Start: 2019-11-04 | End: 2020-01-25

## 2019-11-14 RX ORDER — PROPRANOLOL HCL 60 MG
60 CAPSULE, EXTENDED RELEASE 24HR ORAL DAILY
Qty: 90 CAPSULE | Refills: 0 | Status: SHIPPED | OUTPATIENT
Start: 2019-11-14 | End: 2020-01-25

## 2019-11-25 DIAGNOSIS — J45.20 MILD INTERMITTENT ASTHMA WITHOUT COMPLICATION: ICD-10-CM

## 2019-11-29 RX ORDER — ALBUTEROL SULFATE 90 UG/1
AEROSOL, METERED RESPIRATORY (INHALATION)
Qty: 36 G | Refills: 0 | OUTPATIENT
Start: 2019-11-29

## 2020-01-05 ENCOUNTER — HOSPITAL ENCOUNTER (EMERGENCY)
Facility: HOSPITAL | Age: 35
Discharge: HOME | End: 2020-01-05
Attending: EMERGENCY MEDICINE | Admitting: EMERGENCY MEDICINE
Payer: COMMERCIAL

## 2020-01-05 VITALS
DIASTOLIC BLOOD PRESSURE: 62 MMHG | HEART RATE: 76 BPM | WEIGHT: 150 LBS | BODY MASS INDEX: 26.57 KG/M2 | SYSTOLIC BLOOD PRESSURE: 96 MMHG | RESPIRATION RATE: 20 BRPM | TEMPERATURE: 99 F | OXYGEN SATURATION: 97 %

## 2020-01-05 DIAGNOSIS — R52 BODY ACHES: ICD-10-CM

## 2020-01-05 DIAGNOSIS — R51.9 HEADACHE: ICD-10-CM

## 2020-01-05 DIAGNOSIS — B34.9 VIRAL SYNDROME: Primary | ICD-10-CM

## 2020-01-05 LAB
FLUAV RNA NPH QL NAA+PROBE: NORMAL
FLUBV RNA NPH QL NAA+PROBE: NORMAL
RSV RNA NPH QL NAA+PROBE: NORMAL

## 2020-01-05 PROCEDURE — 99284 EMERGENCY DEPT VISIT MOD MDM: CPT

## 2020-01-05 PROCEDURE — 99284 EMERGENCY DEPT VISIT MOD MDM: CPT | Performed by: EMERGENCY MEDICINE

## 2020-01-05 PROCEDURE — 87631 RESP VIRUS 3-5 TARGETS: CPT | Performed by: EMERGENCY MEDICINE

## 2020-01-05 RX ORDER — IBUPROFEN 600 MG/1
600 TABLET ORAL ONCE
Status: COMPLETED | OUTPATIENT
Start: 2020-01-05 | End: 2020-01-05

## 2020-01-05 RX ORDER — METOCLOPRAMIDE 10 MG/1
10 TABLET ORAL ONCE
Status: COMPLETED | OUTPATIENT
Start: 2020-01-05 | End: 2020-01-05

## 2020-01-05 RX ORDER — METHOCARBAMOL 500 MG/1
500 TABLET, FILM COATED ORAL ONCE
Status: COMPLETED | OUTPATIENT
Start: 2020-01-05 | End: 2020-01-05

## 2020-01-05 RX ORDER — DIPHENHYDRAMINE HCL 25 MG
12.5 TABLET ORAL ONCE
Status: COMPLETED | OUTPATIENT
Start: 2020-01-05 | End: 2020-01-05

## 2020-01-05 RX ORDER — METHOCARBAMOL 500 MG/1
500 TABLET, FILM COATED ORAL EVERY 8 HOURS PRN
Qty: 21 TABLET | Refills: 0 | Status: SHIPPED | OUTPATIENT
Start: 2020-01-05 | End: 2020-01-12

## 2020-01-05 RX ORDER — ACETAMINOPHEN 325 MG/1
975 TABLET ORAL ONCE
Status: COMPLETED | OUTPATIENT
Start: 2020-01-05 | End: 2020-01-05

## 2020-01-05 RX ADMIN — METOCLOPRAMIDE 10 MG: 10 TABLET ORAL at 22:42

## 2020-01-05 RX ADMIN — DIPHENHYDRAMINE HCL 12.5 MG: 25 TABLET ORAL at 22:43

## 2020-01-05 RX ADMIN — IBUPROFEN 600 MG: 600 TABLET ORAL at 21:26

## 2020-01-05 RX ADMIN — ACETAMINOPHEN 975 MG: 325 TABLET ORAL at 21:26

## 2020-01-05 RX ADMIN — METHOCARBAMOL TABLETS 500 MG: 500 TABLET, COATED ORAL at 22:50

## 2020-01-06 NOTE — ED NOTES
Pt's friend "Where is the nurse? We need a Lyft  We came in by ambulance and we live over there by the [facility] a phone for them to contact someone, "We aint got no body"  Offered to call a taxi, "We aint got no money  We were told we would get a Lyft home" Explained that this is a one time courtesy however when the pt is d/c is it the pt's responsibility to get themselves home  Spoke with charge nurse and Lyft was arranged        Michelle Knight RN  01/05/20 3978

## 2020-01-06 NOTE — ED PROVIDER NOTES
History  Chief Complaint   Patient presents with    Flu Symptoms     vomiting, body aches, felt hot cough, did not get flu shot this year     28 yo F presents to ED for 4 day history of diffuse body aches, fatigue, and subjective fever  Pt says she took naproxen one time yesterday without improvement  Denies cough, congestion, rhinorrhea, or sore throat  No known sick contacts  Prior to Admission Medications   Prescriptions Last Dose Informant Patient Reported? Taking? ARIPiprazole (ABILIFY) 15 mg tablet   Yes Yes   Sig: TAKE 1 TABLET POR VIA ORAL ANTES DE DORMIR EN LA NOCHE   DULoxetine (CYMBALTA) 30 mg delayed release capsule   Yes Yes   Sig: TOME ELIZABETH CAPSULA POR VIA ORAL EN LA MA? GOSIA   NAPROXEN  MG EC tablet   No No   Sig: TAKE 1 TABLET (500 MG TOTAL) BY MOUTH 2 (TWO) TIMES A DAY WITH MEALS   Riboflavin (VITAMIN B-2) 100 MG TABS   No No   Sig: TAKE 4 TABLETS POR VIA ORAL DIARIAMENTE   Riboflavin (VITAMIN B-2) 100 MG TABS   No No   Simg tablets; 4x/day   Spacer/Aero-Holding Chambers (PRO COMFORT SPACER ADULT) MISC   No No   Sig: USE AS DIRECTED   albuterol (PROVENTIL HFA,VENTOLIN HFA) 90 mcg/act inhaler   No Yes   Sig: INHALE 2 PUFFS POR VIA ORAL CADA 6 HORAS   busPIRone (BUSPAR) 10 mg tablet   Yes Yes   Sig: Take by mouth   cyclobenzaprine (FLEXERIL) 10 mg tablet   No No   Sig: Take 1 tablet (10 mg total) by mouth daily at bedtime   dicyclomine (BENTYL) 10 mg capsule   No No   Sig: Take 2 capsules (20 mg total) by mouth 3 (three) times a day as needed (naseau, abdominal pain)   fluticasone (FLONASE) 50 mcg/act nasal spray   No Yes   Si sprays into each nostril continuous as needed for rhinitis   ibuprofen (MOTRIN) 400 mg tablet   No No   Sig: Take 1 tablet (400 mg total) by mouth every 8 (eight) hours as needed for mild pain for up to 15 days   ibuprofen (MOTRIN) 600 mg tablet   No No   Sig: Take 1 tablet (600 mg total) by mouth every 8 (eight) hours as needed for fever or headaches lamoTRIgine (LaMICtal) 100 mg tablet   Yes Yes   Sig: Take 100 mg by mouth every morning   methocarbamol (ROBAXIN) 750 mg tablet   No No   Sig: Take 1 tablet (750 mg total) by mouth every 6 (six) hours as needed for muscle spasms for up to 30 days   mirtazapine (REMERON) 30 mg tablet   Yes Yes   Sig: TAKE 1 TABLET POR VIA ORAL ANTES DE DORMIR EN LA NOCHE   montelukast (SINGULAIR) 10 mg tablet   No Yes   Sig: TAKE 1 TABLET POR VIA ORAL DIARIAMENTE   naproxen (EC NAPROSYN) 500 MG EC tablet   No Yes   Sig: Take 1 tablet (500 mg total) by mouth 2 (two) times a day with meals   propranolol (INDERAL LA) 60 mg 24 hr capsule   No No   Sig: TAKE 1 CAPSULE (60 MG TOTAL) BY MOUTH DAILY   propranolol (INDERAL LA) 60 mg 24 hr capsule   No Yes   Sig: Take 1 capsule (60 mg total) by mouth daily      Facility-Administered Medications: None       Past Medical History:   Diagnosis Date    Asthma     Chlamydia infection     Depression     IBS (irritable bowel syndrome)     Migraine     Varicose veins of both lower extremities        Past Surgical History:   Procedure Laterality Date    COLPOSCOPY         Family History   Problem Relation Age of Onset    Diabetes Brother     No Known Problems Mother     Cirrhosis Father     No Known Problems Sister     Autism Son     No Known Problems Brother     Depression Son      I have reviewed and agree with the history as documented  Social History     Tobacco Use    Smoking status: Current Every Day Smoker     Packs/day: 1 00     Years: 15 00     Pack years: 15 00    Smokeless tobacco: Never Used   Substance Use Topics    Alcohol use: Yes     Comment: social    Drug use: Never        Review of Systems   Constitutional: Positive for activity change, fatigue and fever (subjective)  HENT: Negative for congestion, rhinorrhea, sore throat and trouble swallowing  Eyes: Negative for pain, discharge and visual disturbance     Respiratory: Negative for cough, chest tightness and shortness of breath  Cardiovascular: Negative for chest pain, palpitations and leg swelling  Gastrointestinal: Positive for nausea  Negative for abdominal pain and diarrhea  Genitourinary: Negative for dysuria, frequency and urgency  Musculoskeletal: Negative for gait problem, neck pain and neck stiffness  Skin: Negative for color change, rash and wound  Neurological: Positive for headaches  Negative for dizziness, syncope and light-headedness  Physical Exam  ED Triage Vitals [01/05/20 2045]   Temperature Pulse Respirations Blood Pressure SpO2   99 °F (37 2 °C) 76 20 96/62 97 %      Temp Source Heart Rate Source Patient Position - Orthostatic VS BP Location FiO2 (%)   Oral -- -- -- --      Pain Score       8             Orthostatic Vital Signs  Vitals:    01/05/20 2045   BP: 96/62   Pulse: 76       Physical Exam   Constitutional: She is oriented to person, place, and time  She appears well-developed and well-nourished  Appears fatigued but no acute distress   HENT:   Head: Normocephalic and atraumatic  Mouth/Throat: Oropharynx is clear and moist    Eyes: Conjunctivae and EOM are normal  Right eye exhibits no discharge  Left eye exhibits no discharge  Neck: Normal range of motion  Neck supple  Cardiovascular: Normal rate, regular rhythm and intact distal pulses  Pulmonary/Chest: Effort normal and breath sounds normal  No respiratory distress  She exhibits no tenderness  Abdominal: Soft  There is no tenderness  There is no rebound and no guarding  Musculoskeletal: Normal range of motion  She exhibits no edema or tenderness  Lymphadenopathy:     She has no cervical adenopathy  Neurological: She is alert and oriented to person, place, and time  No sensory deficit  She exhibits normal muscle tone  Skin: Skin is warm and dry  Capillary refill takes less than 2 seconds  No rash noted  Nursing note and vitals reviewed        ED Medications  Medications   acetaminophen (TYLENOL) tablet 975 mg (975 mg Oral Given 1/5/20 2126)   ibuprofen (MOTRIN) tablet 600 mg (600 mg Oral Given 1/5/20 2126)   metoclopramide (REGLAN) tablet 10 mg (10 mg Oral Given 1/5/20 2242)   diphenhydrAMINE (BENADRYL) tablet 12 5 mg (12 5 mg Oral Given 1/5/20 2243)   methocarbamol (ROBAXIN) tablet 500 mg (500 mg Oral Given 1/5/20 2250)       Diagnostic Studies  Results Reviewed     Procedure Component Value Units Date/Time    Influenza A/B and RSV PCR [[id number]]  (Normal) Collected:  01/05/20 2128    Lab Status:  Final result Specimen:  Nasopharyngeal Swab Updated:  01/05/20 2222     INFLUENZA A PCR None Detected     INFLUENZA B PCR None Detected     RSV PCR None Detected                 No orders to display         Procedures  Procedures      ED Course                               MDM  Number of Diagnoses or Management Options  Body aches:   Headache:   Viral syndrome:   Diagnosis management comments: Likely viral illness  Normal vital signs  Pt appears fatigued but otherwise benign physical exam, nonfocal   Flu negative  Will treat symptomatically  Tylenol, motrin, reglan, benadryl for headache, generalized pain  Robaxin for muscle aches/spasm  Return precautions discussed        Disposition  Final diagnoses:   Viral syndrome   Body aches   Headache     Time reflects when diagnosis was documented in both MDM as applicable and the Disposition within this note     Time User Action Codes Description Comment    1/5/2020 10:44 PM Baron Jensen Add [B34 9] Viral syndrome     1/5/2020 10:44 PM Plymouth Dimes [R52] Body aches     1/5/2020 10:44 PM Baron Jensen Add [R51] Headache       ED Disposition     ED Disposition Condition Date/Time Comment    Discharge Stable Sun Jan 5, 2020 10:44 PM Janine Araiza discharge to home/self care              Follow-up Information     Follow up With Specialties Details Why [address]  Emergency Department Emergency Medicine  As needed, If symptoms worsen [address]  [phone] [facility] ED, [address], [place], [place], [facility]    your primary care doctor  Schedule an appointment as soon as possible for a visit in 3 days As needed            Discharge Medication List as of 1/5/2020 10:46 PM      CONTINUE these medications which have CHANGED    Details   methocarbamol (ROBAXIN) 500 mg tablet Take 1 tablet (500 mg total) by mouth every 8 (eight) hours as needed for muscle spasms for up to 7 days, Starting Sun 1/5/2020, Until Sun 1/12/2020, Print         CONTINUE these medications which have NOT CHANGED    Details   albuterol (PROVENTIL HFA,VENTOLIN HFA) 90 mcg/act inhaler INHALE 2 PUFFS POR VIA ORAL CADA 6 HORAS, Normal      ARIPiprazole (ABILIFY) 15 mg tablet TAKE 1 TABLET POR VIA ORAL ANTES DE DORMIR EN LA NOCHE, Historical Med      busPIRone (BUSPAR) 10 mg tablet Take by mouth, Historical Med      cyclobenzaprine (FLEXERIL) 10 mg tablet Take 1 tablet (10 mg total) by mouth daily at bedtime, Starting Thu 7/18/2019, Normal      dicyclomine (BENTYL) 10 mg capsule Take 2 capsules (20 mg total) by mouth 3 (three) times a day as needed ([place]ase[place], abdominal pain), Starting Wed 8/7/2019, Normal      DULoxetine (CYMBALTA) 30 mg delayed release capsule TOME ELIZABETH CAPSULA POR VIA ORAL EN LA MA? GOSIA, Historical Med      fluticasone (FLONASE) 50 mcg/act nasal spray 2 sprays into each nostril continuous as needed for rhinitis, Starting Wed 8/7/2019, Normal      ibuprofen (MOTRIN) 600 mg tablet Take 1 tablet (600 mg total) by mouth every 8 (eight) hours as needed for fever or headaches, Starting Thu 5/23/2019, Normal      lamoTRIgine (LaMICtal) 100 mg tablet Take 100 mg by mouth every morning, Starting Sun 4/15/2018, Historical Med      mirtazapine (REMERON) 30 mg tablet TAKE 1 TABLET POR VIA ORAL ANTES DE DORMIR EN LA NOCHE, Historical Med      montelukast (SINGULAIR) 10 mg tablet TAKE 1 TABLET POR VIA ORAL DIARIAMENTE, Normal      !! naproxen (EC NAPROSYN) 500 MG EC tablet Take 1 tablet (500 mg total) by mouth 2 (two) times a day with meals, Starting Mon 11/4/2019, Normal      !! NAPROXEN  MG EC tablet TAKE 1 TABLET (500 MG TOTAL) BY MOUTH 2 (TWO) TIMES A DAY WITH MEALS, Normal      !! propranolol (INDERAL LA) 60 mg 24 hr capsule TAKE 1 CAPSULE (60 MG TOTAL) BY MOUTH DAILY, Starting Thu 11/14/2019, Normal      !! propranolol (INDERAL LA) 60 mg 24 hr capsule Take 1 capsule (60 mg total) by mouth daily, Starting Mon 11/4/2019, Normal      !! Riboflavin (VITAMIN B-2) 100 MG TABS TAKE 4 TABLETS POR VIA ORAL DIARIAMENTE, Normal      !! Riboflavin (VITAMIN B-2) 100 MG TABS 100mg tablets; 4x/day, Normal      Spacer/Aero-Holding Chambers (PRO COMFORT SPACER ADULT) MISC USE AS DIRECTED, Normal       !! - Potential duplicate medications found  Please discuss with provider  No discharge procedures on file  ED Provider  Attending physically available and evaluated Roxana Mello  I managed the patient along with the ED Attending      Electronically Signed by         Nayana Jurado MD  01/06/20 5151

## 2020-01-12 NOTE — ED ATTENDING ATTESTATION
1/5/2020  ICastillo MD, saw and evaluated the patient  I have discussed the patient with the resident and agree with the resident's findings, Plan of Care, and MDM as documented in the resident's note, unless otherwise documented below  All available labs and Radiology studies were reviewed by myself  I was present for key portions of any procedure(s) performed by the resident and I was immediately available to provide assistance  I agree with the current assessment done in the Emergency Department  I have conducted an independent evaluation of this patient  Briefly, this is a 27 y o  female with past medical history of asthma, IBS, migraine headaches, depression, presenting with fevers, myalgias, and increased fatigue over the past 4 days  Patient took naproxen yesterday without any improvement in symptoms  She has not had any nasal congestion, cough, shortness of breath, sore throat  She has had headaches that are typical of her usual headaches  At present, headache is moderate, it was not sudden onset, it is associated but some photosensitivity  She did have several episodes of nausea and an episode of nonbilious emesis  On review of systems, patient reports fevers, fatigue, headaches, she denies chest pain, shortness of breath, cough, abdominal pain, she reports nausea and 1 episode of emesis, there is no dysuria, she reports that mood has been okay and her depression is controlled, although she does have a lot of stressors  She has no focal weakness or numbness  Physical Exam  Vitals:    01/05/20 2045   BP: 96/62   TempSrc: Oral   Pulse: 76   Resp: 20   Temp: 99 °F (37 2 °C)       Constitutional:  Awake, alert, oriented  Appears fatigued but nontoxic and in no acute distress  HEENT:  Normocephalic, atraumatic  Sclera anicteric, conjunctiva not injected  Moist oral mucosa  Cardiac:  Regular rate and rhythm, no murmurs, rubs, or gallops  2+ radial pulses    Respiratory:  Lungs are clear to auscultation bilaterally, no wheezes or rales  Abdomen:  Nondistended  Bowel sounds present  No tenderness to palpation  No rebound or guarding  Extremities:  No deformities, no edema  Integument:  No rashes or exposed areas, cap refill less than 2 seconds  Neurologic:  Awake, alert, and oriented x3  Nonfocal exam   Psychiatric:  Normal affect, pleasant    Labs Reviewed   INFLUENZA A/B AND RSV PCR - Normal       Result Value Ref Range Status    INFLUENZA A PCR None Detected  None Detected Final    INFLUENZA B PCR None Detected  None Detected Final    RSV PCR None Detected  None Detected Final       ED Course  Medications   acetaminophen (TYLENOL) tablet 975 mg (975 mg Oral Given 1/5/20 2126)   ibuprofen (MOTRIN) tablet 600 mg (600 mg Oral Given 1/5/20 2126)   metoclopramide (REGLAN) tablet 10 mg (10 mg Oral Given 1/5/20 2242)   diphenhydrAMINE (BENADRYL) tablet 12 5 mg (12 5 mg Oral Given 1/5/20 2243)   methocarbamol (ROBAXIN) tablet 500 mg (500 mg Oral Given 1/5/20 250)     50-year-old female presenting with subjective fevers, generalized myalgias, nausea, and malaise  Vital signs reviewed, afebrile and within normal limits  Given current influenza season, differential diagnosis includes influenza, versus another viral illness  Patient is overall well-appearing with a reassuring physical exam and with a nonspecific review of systems  Will obtain nasal swab for influenza, but otherwise, at present, recommend symptomatic treatment and close follow-up with primary care physician  Influenza PCR is negative  For patient's headache, Reglan, Benadryl, and ibuprofen administered with improvement in symptoms  For generalized body aches, Tylenol and Robaxin administered  Following therapies above, patient feels markedly improved    Recommend close follow-up with primary care physician for further evaluation, patient may benefit from evaluation of her thyroid and for possible underlying anemia versus another etiology of her fatigue        Clinical Impression  Final diagnoses:   Viral syndrome   Body aches   Headache

## 2020-01-16 DIAGNOSIS — J45.20 MILD INTERMITTENT ASTHMA WITHOUT COMPLICATION: ICD-10-CM

## 2020-01-17 RX ORDER — MONTELUKAST SODIUM 10 MG/1
TABLET ORAL
Qty: 180 TABLET | Refills: 0 | Status: SHIPPED | OUTPATIENT
Start: 2020-01-17 | End: 2020-06-15

## 2020-01-20 DIAGNOSIS — M75.82 ROTATOR CUFF TENDONITIS, LEFT: ICD-10-CM

## 2020-01-20 DIAGNOSIS — G43.909 MIGRAINE: ICD-10-CM

## 2020-01-25 RX ORDER — PROPRANOLOL HCL 60 MG
60 CAPSULE, EXTENDED RELEASE 24HR ORAL DAILY
Qty: 90 CAPSULE | Refills: 0 | Status: SHIPPED | OUTPATIENT
Start: 2020-01-25 | End: 2020-05-05

## 2020-01-30 DIAGNOSIS — G43.909 MIGRAINE: ICD-10-CM

## 2020-02-17 DIAGNOSIS — G43.909 MIGRAINE: ICD-10-CM

## 2020-03-14 DIAGNOSIS — G43.909 MIGRAINE: ICD-10-CM

## 2020-03-16 DIAGNOSIS — G43.909 MIGRAINE: ICD-10-CM

## 2020-03-31 RX ORDER — PROPRANOLOL HCL 60 MG
60 CAPSULE, EXTENDED RELEASE 24HR ORAL DAILY
Qty: 90 CAPSULE | Refills: 0 | Status: SHIPPED | OUTPATIENT
Start: 2020-03-31 | End: 2021-03-25 | Stop reason: SDUPTHER

## 2020-04-03 DIAGNOSIS — G43.909 MIGRAINE: ICD-10-CM

## 2020-04-29 DIAGNOSIS — G43.909 MIGRAINE: ICD-10-CM

## 2020-04-29 DIAGNOSIS — M75.82 ROTATOR CUFF TENDONITIS, LEFT: ICD-10-CM

## 2020-05-04 DIAGNOSIS — G43.909 MIGRAINE: ICD-10-CM

## 2020-05-05 RX ORDER — PROPRANOLOL HCL 60 MG
60 CAPSULE, EXTENDED RELEASE 24HR ORAL DAILY
Qty: 90 CAPSULE | Refills: 0 | Status: SHIPPED | OUTPATIENT
Start: 2020-05-05 | End: 2020-09-08

## 2020-06-13 DIAGNOSIS — J45.20 MILD INTERMITTENT ASTHMA WITHOUT COMPLICATION: ICD-10-CM

## 2020-06-15 RX ORDER — MONTELUKAST SODIUM 10 MG/1
TABLET ORAL
Qty: 180 TABLET | Refills: 0 | Status: SHIPPED | OUTPATIENT
Start: 2020-06-15 | End: 2020-07-11

## 2020-06-18 DIAGNOSIS — M75.82 ROTATOR CUFF TENDONITIS, LEFT: ICD-10-CM

## 2020-06-18 DIAGNOSIS — G43.909 MIGRAINE: ICD-10-CM

## 2020-07-10 DIAGNOSIS — J45.20 MILD INTERMITTENT ASTHMA WITHOUT COMPLICATION: ICD-10-CM

## 2020-07-11 RX ORDER — MONTELUKAST SODIUM 10 MG/1
TABLET ORAL
Qty: 180 TABLET | Refills: 0 | Status: SHIPPED | OUTPATIENT
Start: 2020-07-11 | End: 2021-05-06

## 2020-08-11 DIAGNOSIS — G43.909 MIGRAINE: ICD-10-CM

## 2020-09-08 RX ORDER — PROPRANOLOL HCL 60 MG
60 CAPSULE, EXTENDED RELEASE 24HR ORAL DAILY
Qty: 90 CAPSULE | Refills: 0 | Status: SHIPPED | OUTPATIENT
Start: 2020-09-08 | End: 2020-10-21 | Stop reason: ALTCHOICE

## 2020-09-11 ENCOUNTER — TELEMEDICINE (OUTPATIENT)
Dept: INTERNAL MEDICINE CLINIC | Facility: CLINIC | Age: 35
End: 2020-09-11

## 2020-09-11 DIAGNOSIS — B34.9 VIRAL INFECTION: Primary | ICD-10-CM

## 2020-09-11 PROCEDURE — G2012 BRIEF CHECK IN BY MD/QHP: HCPCS | Performed by: INTERNAL MEDICINE

## 2020-09-11 RX ORDER — SENNOSIDES 8.6 MG
650 CAPSULE ORAL EVERY 8 HOURS PRN
Qty: 30 TABLET | Refills: 0 | Status: SHIPPED | OUTPATIENT
Start: 2020-09-11 | End: 2020-09-14 | Stop reason: SDUPTHER

## 2020-09-11 NOTE — PROGRESS NOTES
COVID-19 Virtual Visit     Assessment/Plan:    Problem List Items Addressed This Visit     None      Visit Diagnoses     Viral infection    -  Primary    Relevant Medications    acetaminophen (TYLENOL) 650 mg CR tablet    Other Relevant Orders    Novel Coronavirus (COVID-19), PCR [facility] - Collected at [employer] [facility] or [facility]        Based on patient's symptoms and history of children coming back from [place] recommend COVID-19 testing  Will reach out to patient for Monday visit, understands to follow-up with emergency room if develops shortness of breath  Will treat symptomatically with aggressive hydration and Tylenol as needed  This virtual check-in was done via Telephone  It was attempted to have a virtual visit but due to  Communication complications this visit was done through telephone with Tongan-speaking   Disposition:      I referred Jessenia Dumas to one of our centralized sites for a COVID-19 swab    I spent 15 minutes directly with the patient during this visit    Encounter provider Fernanda March DO    [facility] [phone]    Recent Visits  No visits were found meeting these conditions  Showing recent visits within past 7 days and meeting all other requirements     Today's Visits  Date Type Provider Dept   09/11/20 Telemedicine Fernanda March DO [address] today's visits and meeting all other requirements     Future Appointments  No visits were found meeting these conditions  Showing future appointments within next 150 days and meeting all other requirements        Patient agrees to participate in a virtual check in via telephone or video visit instead of presenting to the office to address urgent/immediate medical needs  Patient is aware this is a billable service         After connecting through telephone, the patient was identified by name and date of birth  Stephenie Benjamin was informed that this was a telemedicine visit and that the exam was being conducted confidentially over secure lines  My office door was closed  No one else was in the room  Stephenie Benjamin acknowledged consent and understanding of privacy and security of the telemedicine visit  I informed the patient that I have reviewed her record in Epic and presented the opportunity for her to ask any questions regarding the visit today  The patient agreed to participate  Subjective    Stephenie Benjamin is a 38 y o  female who is concerned about COVID-19  She reports fever, chills, repeated shaking with chills, cough, shortness of breath, headache, sore throat, anorexia, fatigue, myalgias, diarrhea, nausea and vomiting  She has not experienced anosmia and abdominal pain She has not had contact with a person who is under investigation for or who is positive for COVID-19 within the last 14 days  She has not been hospitalized recently for fever and/or lower respiratory symptoms      Past Medical History:   Diagnosis Date    Asthma     Chlamydia infection     Depression     IBS (irritable bowel syndrome)     Migraine     Varicose veins of both lower extremities        Past Surgical History:   Procedure Laterality Date    COLPOSCOPY         Current Outpatient Medications   Medication Sig Dispense Refill    acetaminophen (TYLENOL) 650 mg CR tablet Take 1 tablet (650 mg total) by mouth every 8 (eight) hours as needed for mild pain 30 tablet 0    albuterol (PROVENTIL HFA,VENTOLIN HFA) 90 mcg/act inhaler INHALE 2 PUFFS POR VIA ORAL CADA 6 HORAS 36 g 0    ARIPiprazole (ABILIFY) 15 mg tablet TAKE 1 TABLET POR VIA ORAL ANTES DE DORMIR EN LA NOCHE  0    busPIRone (BUSPAR) 10 mg tablet Take by mouth      cyclobenzaprine (FLEXERIL) 10 mg tablet Take 1 tablet (10 mg total) by mouth daily at bedtime 15 tablet 1    dicyclomine (BENTYL) 10 mg capsule Take 2 capsules (20 mg total) by mouth 3 (three) times a day as needed ([place], abdominal pain) 90 capsule 0    DULoxetine (CYMBALTA) 30 mg delayed release capsule TOME ELIZABETH CAPSULA POR VIA ORAL EN LA MA? GOSIA  0    fluticasone (FLONASE) 50 mcg/act nasal spray 2 sprays into each nostril continuous as needed for rhinitis 2 Bottle 0    ibuprofen (MOTRIN) 600 mg tablet Take 1 tablet (600 mg total) by mouth every 8 (eight) hours as needed for fever or headaches 60 tablet 0    lamoTRIgine (LaMICtal) 100 mg tablet Take 100 mg by mouth every morning  0    methocarbamol (ROBAXIN) 500 mg tablet Take 1 tablet (500 mg total) by mouth every 8 (eight) hours as needed for muscle spasms for up to 7 days 21 tablet 0    mirtazapine (REMERON) 30 mg tablet TAKE 1 TABLET POR VIA ORAL ANTES DE DORMIR EN LA NOCHE  0    montelukast (SINGULAIR) 10 mg tablet TAKE 1 TABLET POR VIA ORAL DIARIAMENTE 180 tablet 0    NAPROXEN  MG EC tablet TAKE 1 TABLET (500 MG TOTAL) BY MOUTH 2 (TWO) TIMES A DAY WITH MEALS 180 tablet 0    NAPROXEN  MG EC tablet TAKE 1 TABLET (500 MG TOTAL) BY MOUTH 2 (TWO) TIMES A DAY WITH MEALS 180 tablet 0    propranolol (INDERAL LA) 60 mg 24 hr capsule TAKE 1 CAPSULE (60 MG TOTAL) BY MOUTH DAILY 90 capsule 0    propranolol (INDERAL LA) 60 mg 24 hr capsule TAKE 1 CAPSULE (60 MG TOTAL) BY MOUTH DAILY 90 capsule 0    Riboflavin (VITAMIN B-2) 100 MG TABS 100mg tablets; 4x/day 360 each 0    Riboflavin (VITAMIN B-2) 100 MG TABS TAKE 4 TABLETS POR VIA ORAL DIARIAMENTE 90 tablet 0    Spacer/Aero-Holding Chambers (PRO COMFORT SPACER ADULT) MISC USE AS DIRECTED 1 each 0     No current facility-administered medications for this visit  No Known Allergies        VIRTUAL VISIT DISCLAIMER    Guilleamie Tim acknowledges that she has consented to an online visit or consultation   She understands that the online visit is based solely on information provided by her, and that, in the absence of a face-to-face physical evaluation by the physician, the diagnosis she receives is both limited and provisional in terms of accuracy and completeness  This is not intended to replace a full medical face-to-face evaluation by the physician  Vesna Lomeli understands and accepts these terms      DO Paula Torres 73 Internal Medicine PGY-2

## 2020-09-14 ENCOUNTER — TELEMEDICINE (OUTPATIENT)
Dept: INTERNAL MEDICINE CLINIC | Facility: CLINIC | Age: 35
End: 2020-09-14

## 2020-09-14 DIAGNOSIS — R11.0 NAUSEA: ICD-10-CM

## 2020-09-14 DIAGNOSIS — B34.9 VIRAL INFECTION: ICD-10-CM

## 2020-09-14 DIAGNOSIS — R10.13 EPIGASTRIC PAIN: ICD-10-CM

## 2020-09-14 DIAGNOSIS — J45.20 MILD INTERMITTENT ASTHMA WITHOUT COMPLICATION: ICD-10-CM

## 2020-09-14 DIAGNOSIS — S93.401D MILD SPRAIN OF RIGHT ANKLE, SUBSEQUENT ENCOUNTER: Primary | ICD-10-CM

## 2020-09-14 PROCEDURE — 99213 OFFICE O/P EST LOW 20 MIN: CPT | Performed by: INTERNAL MEDICINE

## 2020-09-14 RX ORDER — DICYCLOMINE HYDROCHLORIDE 10 MG/1
20 CAPSULE ORAL 3 TIMES DAILY PRN
Qty: 90 CAPSULE | Refills: 0 | Status: SHIPPED | OUTPATIENT
Start: 2020-09-14 | End: 2020-10-16

## 2020-09-14 RX ORDER — SENNOSIDES 8.6 MG
650 CAPSULE ORAL EVERY 8 HOURS PRN
Qty: 30 TABLET | Refills: 0 | Status: SHIPPED | OUTPATIENT
Start: 2020-09-14 | End: 2020-12-11 | Stop reason: SDUPTHER

## 2020-09-14 RX ORDER — ALBUTEROL SULFATE 90 UG/1
2 AEROSOL, METERED RESPIRATORY (INHALATION) EVERY 6 HOURS PRN
Qty: 36 G | Refills: 0 | Status: SHIPPED | OUTPATIENT
Start: 2020-09-14 | End: 2020-11-27

## 2020-09-14 NOTE — PROGRESS NOTES
Virtual Regular Visit      Assessment/Plan:    Mild sprain of right ankle, subsequent encounter  Two month history of recurrent right ankle sprain, has had 5-6 falls secondary to this  No erythema or edema noted  Is able to bear weight  Likely recurrent ankle sprain  · Advised to continue with symptomatic management including the profound/Tylenol for pain relief, occasional lower extremity as well as icing area as tolerated  · Will prescribe ankle brace at this time, scripts sent [address]  · Recommend physical therapy, order placed  · ED precautions discussed  Patient verbalized understanding  Advised to contact clinic back if symptoms continue to worsen, patient may require air/gel ankle brace versus further specialty evaluation  URI Symptoms, concern for COVID  Telephone visit on 09/11, endorsed fevers/chills, cough, congestion- sinuses in from [place]  COVID test ordered at that time  Patient states all symptoms have resolved aside from congestion, but did experience chills last night  · Is not interested in getting tested at this time  · Advised to continue to quarantine for 10 days total and until patient is 72 hours symptom-free  · ED precautions discussed, patient verbalized understanding      Problem List Items Addressed This Visit        Respiratory    Mild intermittent asthma    Relevant Medications    albuterol (PROVENTIL HFA,VENTOLIN HFA) 90 mcg/act inhaler       Other    Nausea    Relevant Medications    dicyclomine (BENTYL) 10 mg capsule    Epigastric pain    Relevant Medications    dicyclomine (BENTYL) 10 mg capsule      Other Visit Diagnoses     Mild sprain of right ankle, subsequent encounter    -  Primary    Relevant Orders    Ambulatory referral to Physical Therapy    Ankle Cude ankle/Ankle Brace    Viral infection        Relevant Medications    acetaminophen (TYLENOL) 650 mg CR tablet               Reason for visit is   Chief Complaint   Patient presents with    Ankle Pain     Patient is currently having R ankle pain  As per patient she continuously twists her R ankle and she would like to be evaluated for that  Patient informed me that she had cold symptoms last week but this week currently the only symptom she has is congestion  Encounter provider Tad iRco DO    Provider located at [address] [place] [id number] [phone]      Recent Visits  Date Type Provider Dept   09/11/20 [facility] [address] [place] recent visits within past 7 days and meeting all other requirements     Today's Visits  Date Type Provider Dept   09/14/20 [address] [place] [address], [address] [place] today's visits and meeting all other requirements     Future Appointments  No visits were found meeting these conditions  Showing future appointments within next 150 days and meeting all other requirements        The patient was identified by name and date of birth  Johnie Horowitz was informed that this is a telemedicine visit and that the visit is being conducted through [facility] and patient was informed that this is a secure, HIPAA-compliant platform  She agrees to proceed  My office door was closed  No one else was in the room  She acknowledged consent and understanding of privacy and security of the video platform  The patient has agreed to participate and understands they can discontinue the visit at any time  Patient is aware this is a billable service  Subjective  Johnie Horowitz is a 38 y o  female with past medical history of asthma, anxiety, depression, dysthymic disorder who had a telemedicine the visit today with an acute complaint of right ankle pain   Patient states this started about 2 months ago, has fallen about 5-6 times secondary to ankle twisting  Still able to bear weight  Has not tried anything over-the-counter    Has not been keeping it elevated or icing the area  Denies any paresthesias, bilateral lower extremity weakness, chest pain, shortness of breath, palpitation  States the fevers/chills, weakness, congestion, cough she was feeling last Friday has improved overall  Still endorses congestion, had episode of chills overnight  Has been quarantining at home  Pt states that she is unable to get over-the-counter ibuprofen or ankle brace secondary to financial restrictions at this time  Past Medical History:   Diagnosis Date    Asthma     Chlamydia infection     Depression     IBS (irritable bowel syndrome)     Migraine     Varicose veins of both lower extremities        Past Surgical History:   Procedure Laterality Date    COLPOSCOPY         Current Outpatient Medications   Medication Sig Dispense Refill    acetaminophen (TYLENOL) 650 mg CR tablet Take 1 tablet (650 mg total) by mouth every 8 (eight) hours as needed for mild pain 30 tablet 0    albuterol (PROVENTIL HFA,VENTOLIN HFA) 90 mcg/act inhaler Inhale 2 puffs every 6 (six) hours as needed for wheezing 36 g 0    dicyclomine (BENTYL) 10 mg capsule Take 2 capsules (20 mg total) by mouth 3 (three) times a day as needed ([place], abdominal pain) 90 capsule 0    DULoxetine (CYMBALTA) 30 mg delayed release capsule TOME ELIZABETH CAPSULA POR VIA ORAL EN LA MA? GOSIA  0    fluticasone (FLONASE) 50 mcg/act nasal spray 2 sprays into each nostril continuous as needed for rhinitis 2 Bottle 0    montelukast (SINGULAIR) 10 mg tablet TAKE 1 TABLET POR VIA ORAL DIARIAMENTE 180 tablet 0    NAPROXEN  MG EC tablet TAKE 1 TABLET (500 MG TOTAL) BY MOUTH 2 (TWO) TIMES A DAY WITH MEALS 180 tablet 0    propranolol (INDERAL LA) 60 mg 24 hr capsule TAKE 1 CAPSULE (60 MG TOTAL) BY MOUTH DAILY 90 capsule 0    Riboflavin (VITAMIN B-2) 100 MG TABS 100mg tablets; 4x/day 360 each 0    Spacer/Aero-Holding Chambers (PRO COMFORT SPACER ADULT) MISC USE AS DIRECTED 1 each 0    ARIPiprazole (ABILIFY) 15 mg tablet TAKE 1 TABLET POR VIA ORAL ANTES DE DORMIR EN LA NOCHE  0    busPIRone (BUSPAR) 10 mg tablet Take by mouth      cyclobenzaprine (FLEXERIL) 10 mg tablet Take 1 tablet (10 mg total) by mouth daily at bedtime (Patient not taking: Reported on 9/14/2020) 15 tablet 1    ibuprofen (MOTRIN) 600 mg tablet Take 1 tablet (600 mg total) by mouth every 8 (eight) hours as needed for fever or headaches (Patient not taking: Reported on 9/14/2020) 60 tablet 0    lamoTRIgine (LaMICtal) 100 mg tablet Take 100 mg by mouth every morning  0    methocarbamol (ROBAXIN) 500 mg tablet Take 1 tablet (500 mg total) by mouth every 8 (eight) hours as needed for muscle spasms for up to 7 days 21 tablet 0    mirtazapine (REMERON) 30 mg tablet TAKE 1 TABLET POR VIA ORAL ANTES DE DORMIR EN LA NOCHE  0    NAPROXEN  MG EC tablet TAKE 1 TABLET (500 MG TOTAL) BY MOUTH 2 (TWO) TIMES A DAY WITH MEALS (Patient not taking: Reported on 9/14/2020) 180 tablet 0    propranolol (INDERAL LA) 60 mg 24 hr capsule TAKE 1 CAPSULE (60 MG TOTAL) BY MOUTH DAILY (Patient not taking: Reported on 9/14/2020) 90 capsule 0    Riboflavin (VITAMIN B-2) 100 MG TABS TAKE 4 TABLETS POR VIA ORAL DIARIAMENTE (Patient not taking: Reported on 9/14/2020) 90 tablet 0     No current facility-administered medications for this visit  No Known Allergies    Review of Systems   Constitutional: Positive for chills  Negative for activity change, appetite change, fatigue and fever  HENT: Positive for congestion  Negative for facial swelling and trouble swallowing  Eyes: Negative for photophobia, pain, discharge and visual disturbance  Respiratory: Negative for apnea, cough, chest tightness, shortness of breath and wheezing  Cardiovascular: Negative for chest pain and leg swelling  Gastrointestinal: Negative for abdominal distention, abdominal pain, blood in stool, constipation, diarrhea, nausea and vomiting  Endocrine: Negative for polyuria  Genitourinary: Negative for difficulty urinating, dysuria, flank pain and hematuria  Musculoskeletal: Positive for gait problem and joint swelling  Negative for arthralgias and back pain  Skin: Negative for pallor and rash  Neurological: Positive for weakness  Negative for dizziness and tremors  Psychiatric/Behavioral: Negative for agitation, behavioral problems and suicidal ideas  The patient is not nervous/anxious  Video Exam    Vitals:       Physical Exam  Vitals signs reviewed  Constitutional:       Appearance: She is not toxic-appearing or diaphoretic  HENT:      Head: Normocephalic and atraumatic  Eyes:      General: No scleral icterus  Abdominal:      General: Abdomen is flat  Musculoskeletal: Normal range of motion  General: No swelling  Right lower leg: No edema  Left lower leg: No edema  Neurological:      Mental Status: She is alert and oriented to person, place, and time  Psychiatric:         Mood and Affect: Mood normal          Thought Content: Thought content normal           I spent 30 minutes directly with the patient during this visit      VIRTUAL VISIT DISCLAIMER    Apolinar Delgado acknowledges that she has consented to an online visit or consultation  She understands that the online visit is based solely on information provided by her, and that, in the absence of a face-to-face physical evaluation by the physician, the diagnosis she receives is both limited and provisional in terms of accuracy and completeness  This is not intended to replace a full medical face-to-face evaluation by the physician  Apolinar Delgado understands and accepts these terms        Kalee Mai DO  Internal Medicine- PGY2

## 2020-10-09 DIAGNOSIS — R10.13 EPIGASTRIC PAIN: ICD-10-CM

## 2020-10-09 DIAGNOSIS — R11.0 NAUSEA: ICD-10-CM

## 2020-10-13 ENCOUNTER — HOSPITAL ENCOUNTER (EMERGENCY)
Facility: HOSPITAL | Age: 35
Discharge: HOME | End: 2020-10-13
Attending: EMERGENCY MEDICINE | Admitting: EMERGENCY MEDICINE
Payer: COMMERCIAL

## 2020-10-13 ENCOUNTER — TELEPHONE (OUTPATIENT)
Dept: INTERNAL MEDICINE CLINIC | Facility: CLINIC | Age: 35
End: 2020-10-13

## 2020-10-13 ENCOUNTER — APPOINTMENT (EMERGENCY)
Dept: RADIOLOGY | Facility: HOSPITAL | Age: 35
End: 2020-10-13
Payer: COMMERCIAL

## 2020-10-13 VITALS
SYSTOLIC BLOOD PRESSURE: 126 MMHG | BODY MASS INDEX: 27.6 KG/M2 | OXYGEN SATURATION: 98 % | TEMPERATURE: 97.5 F | WEIGHT: 150 LBS | HEART RATE: 92 BPM | RESPIRATION RATE: 19 BRPM | HEIGHT: 62 IN | DIASTOLIC BLOOD PRESSURE: 77 MMHG

## 2020-10-13 DIAGNOSIS — R07.89 FEELING OF CHEST TIGHTNESS: ICD-10-CM

## 2020-10-13 DIAGNOSIS — R07.9 CHEST PAIN: Primary | ICD-10-CM

## 2020-10-13 LAB
ATRIAL RATE: 83 BPM
EXT PREG TEST URINE: NEGATIVE
EXT. CONTROL ED NAV: NORMAL
P AXIS: 44 DEGREES
PR INTERVAL: 130 MS
QRS AXIS: 70 DEGREES
QRSD INTERVAL: 72 MS
QT INTERVAL: 360 MS
QTC INTERVAL: 423 MS
T WAVE AXIS: 4 DEGREES
VENTRICULAR RATE: 83 BPM

## 2020-10-13 PROCEDURE — 81025 URINE PREGNANCY TEST: CPT | Performed by: EMERGENCY MEDICINE

## 2020-10-13 PROCEDURE — 93010 ELECTROCARDIOGRAM REPORT: CPT | Performed by: INTERNAL MEDICINE

## 2020-10-13 PROCEDURE — 99284 EMERGENCY DEPT VISIT MOD MDM: CPT

## 2020-10-13 PROCEDURE — 99285 EMERGENCY DEPT VISIT HI MDM: CPT | Performed by: EMERGENCY MEDICINE

## 2020-10-13 PROCEDURE — 93005 ELECTROCARDIOGRAM TRACING: CPT

## 2020-10-13 PROCEDURE — 71046 X-RAY EXAM CHEST 2 VIEWS: CPT

## 2020-10-13 RX ORDER — SUCRALFATE 1 G/1
1 TABLET ORAL ONCE
Status: COMPLETED | OUTPATIENT
Start: 2020-10-13 | End: 2020-10-13

## 2020-10-13 RX ORDER — LIDOCAINE HYDROCHLORIDE 20 MG/ML
15 SOLUTION OROPHARYNGEAL ONCE
Status: COMPLETED | OUTPATIENT
Start: 2020-10-13 | End: 2020-10-13

## 2020-10-13 RX ORDER — LORAZEPAM 1 MG/1
1 TABLET ORAL ONCE
Status: COMPLETED | OUTPATIENT
Start: 2020-10-13 | End: 2020-10-13

## 2020-10-13 RX ADMIN — SUCRALFATE 1 G: 1 TABLET ORAL at 17:43

## 2020-10-13 RX ADMIN — LIDOCAINE HYDROCHLORIDE 15 ML: 20 SOLUTION ORAL; TOPICAL at 17:35

## 2020-10-13 RX ADMIN — LORAZEPAM 1 MG: 1 TABLET ORAL at 18:09

## 2020-10-16 RX ORDER — DICYCLOMINE HYDROCHLORIDE 10 MG/1
20 CAPSULE ORAL 3 TIMES DAILY PRN
Qty: 90 CAPSULE | Refills: 0 | Status: SHIPPED | OUTPATIENT
Start: 2020-10-16 | End: 2020-11-27

## 2020-10-21 ENCOUNTER — OFFICE VISIT (OUTPATIENT)
Dept: INTERNAL MEDICINE CLINIC | Facility: CLINIC | Age: 35
End: 2020-10-21

## 2020-10-21 VITALS
DIASTOLIC BLOOD PRESSURE: 70 MMHG | BODY MASS INDEX: 30.79 KG/M2 | WEIGHT: 173.8 LBS | SYSTOLIC BLOOD PRESSURE: 110 MMHG | TEMPERATURE: 97.1 F | HEIGHT: 63 IN | OXYGEN SATURATION: 98 % | HEART RATE: 77 BPM

## 2020-10-21 DIAGNOSIS — B34.9 VIRAL INFECTION: ICD-10-CM

## 2020-10-21 DIAGNOSIS — F41.9 ANXIETY: Primary | ICD-10-CM

## 2020-10-21 DIAGNOSIS — M54.2 NECK PAIN ON LEFT SIDE: ICD-10-CM

## 2020-10-21 DIAGNOSIS — Z12.4 PAP SMEAR FOR CERVICAL CANCER SCREENING: ICD-10-CM

## 2020-10-21 DIAGNOSIS — Z23 NEED FOR INFLUENZA VACCINATION: ICD-10-CM

## 2020-10-21 PROCEDURE — 3008F BODY MASS INDEX DOCD: CPT | Performed by: INTERNAL MEDICINE

## 2020-10-21 PROCEDURE — 99213 OFFICE O/P EST LOW 20 MIN: CPT | Performed by: HOSPITALIST

## 2020-10-21 PROCEDURE — 90682 RIV4 VACC RECOMBINANT DNA IM: CPT | Performed by: HOSPITALIST

## 2020-10-21 PROCEDURE — 90471 IMMUNIZATION ADMIN: CPT | Performed by: HOSPITALIST

## 2020-10-21 RX ORDER — QUETIAPINE FUMARATE 400 MG/1
400 TABLET, FILM COATED ORAL
COMMUNITY

## 2020-10-21 RX ORDER — GABAPENTIN 600 MG/1
600 TABLET ORAL 2 TIMES DAILY
COMMUNITY

## 2020-10-24 DIAGNOSIS — G43.909 MIGRAINE: ICD-10-CM

## 2020-10-24 RX ORDER — PROPRANOLOL HCL 60 MG
60 CAPSULE, EXTENDED RELEASE 24HR ORAL DAILY
Qty: 90 CAPSULE | Refills: 0 | OUTPATIENT
Start: 2020-10-24

## 2020-10-26 DIAGNOSIS — J45.20 MILD INTERMITTENT ASTHMA WITHOUT COMPLICATION: ICD-10-CM

## 2020-10-26 DIAGNOSIS — R11.0 NAUSEA: ICD-10-CM

## 2020-10-26 DIAGNOSIS — R10.13 EPIGASTRIC PAIN: ICD-10-CM

## 2020-11-27 RX ORDER — DICYCLOMINE HYDROCHLORIDE 10 MG/1
20 CAPSULE ORAL 3 TIMES DAILY PRN
Qty: 90 CAPSULE | Refills: 0 | Status: SHIPPED | OUTPATIENT
Start: 2020-11-27 | End: 2021-01-12

## 2020-11-27 RX ORDER — ALBUTEROL SULFATE 90 UG/1
2 AEROSOL, METERED RESPIRATORY (INHALATION) EVERY 6 HOURS PRN
Qty: 36 G | Refills: 0 | Status: SHIPPED | OUTPATIENT
Start: 2020-11-27 | End: 2020-12-22

## 2020-12-07 DIAGNOSIS — R10.13 EPIGASTRIC PAIN: ICD-10-CM

## 2020-12-07 DIAGNOSIS — R11.0 NAUSEA: ICD-10-CM

## 2020-12-11 ENCOUNTER — TELEMEDICINE (OUTPATIENT)
Dept: INTERNAL MEDICINE CLINIC | Facility: CLINIC | Age: 35
End: 2020-12-11

## 2020-12-11 DIAGNOSIS — B34.9 VIRAL INFECTION: ICD-10-CM

## 2020-12-11 DIAGNOSIS — Z20.822 EXPOSURE TO COVID-19 VIRUS: Primary | ICD-10-CM

## 2020-12-11 PROCEDURE — G2012 BRIEF CHECK IN BY MD/QHP: HCPCS | Performed by: INTERNAL MEDICINE

## 2020-12-11 RX ORDER — SENNOSIDES 8.6 MG
650 CAPSULE ORAL EVERY 8 HOURS PRN
Qty: 30 TABLET | Refills: 0 | Status: SHIPPED | OUTPATIENT
Start: 2020-12-11 | End: 2020-12-21

## 2020-12-12 DIAGNOSIS — Z20.822 EXPOSURE TO COVID-19 VIRUS: ICD-10-CM

## 2020-12-12 PROCEDURE — 87637 SARSCOV2&INF A&B&RSV AMP PRB: CPT | Performed by: INTERNAL MEDICINE

## 2020-12-15 DIAGNOSIS — J45.20 MILD INTERMITTENT ASTHMA WITHOUT COMPLICATION: ICD-10-CM

## 2020-12-15 LAB
FLUAV RNA NPH QL NAA+PROBE: NOT DETECTED
FLUBV RNA NPH QL NAA+PROBE: NOT DETECTED
RSV RNA NPH QL NAA+PROBE: NOT DETECTED
SARS-COV-2 RNA NPH QL NAA+PROBE: NOT DETECTED

## 2020-12-19 DIAGNOSIS — B34.9 VIRAL INFECTION: ICD-10-CM

## 2020-12-21 RX ORDER — SENNOSIDES 8.6 MG
650 CAPSULE ORAL EVERY 8 HOURS PRN
Qty: 30 TABLET | Refills: 0 | Status: SHIPPED | OUTPATIENT
Start: 2020-12-21

## 2020-12-22 RX ORDER — ALBUTEROL SULFATE 90 UG/1
2 AEROSOL, METERED RESPIRATORY (INHALATION) EVERY 6 HOURS PRN
Qty: 36 G | Refills: 0 | Status: SHIPPED | OUTPATIENT
Start: 2020-12-22 | End: 2021-02-18

## 2021-01-12 RX ORDER — DICYCLOMINE HYDROCHLORIDE 10 MG/1
20 CAPSULE ORAL 3 TIMES DAILY PRN
Qty: 90 CAPSULE | Refills: 0 | Status: SHIPPED | OUTPATIENT
Start: 2021-01-12 | End: 2021-01-27

## 2021-01-22 DIAGNOSIS — R11.0 NAUSEA: ICD-10-CM

## 2021-01-22 DIAGNOSIS — R10.13 EPIGASTRIC PAIN: ICD-10-CM

## 2021-01-27 RX ORDER — DICYCLOMINE HYDROCHLORIDE 10 MG/1
CAPSULE ORAL
Qty: 90 CAPSULE | Refills: 0 | Status: SHIPPED | OUTPATIENT
Start: 2021-01-27

## 2021-02-18 DIAGNOSIS — J45.20 MILD INTERMITTENT ASTHMA WITHOUT COMPLICATION: ICD-10-CM

## 2021-02-18 RX ORDER — ALBUTEROL SULFATE 90 UG/1
2 AEROSOL, METERED RESPIRATORY (INHALATION) EVERY 6 HOURS PRN
Qty: 36 G | Refills: 0 | Status: SHIPPED | OUTPATIENT
Start: 2021-02-18 | End: 2021-03-10

## 2021-03-09 DIAGNOSIS — J45.20 MILD INTERMITTENT ASTHMA WITHOUT COMPLICATION: ICD-10-CM

## 2021-03-10 RX ORDER — ALBUTEROL SULFATE 90 UG/1
2 AEROSOL, METERED RESPIRATORY (INHALATION) EVERY 6 HOURS PRN
Qty: 36 G | Refills: 0 | Status: SHIPPED | OUTPATIENT
Start: 2021-03-10 | End: 2021-05-18

## 2021-03-25 DIAGNOSIS — G43.909 MIGRAINE: ICD-10-CM

## 2021-03-25 RX ORDER — PROPRANOLOL HCL 60 MG
60 CAPSULE, EXTENDED RELEASE 24HR ORAL DAILY
Qty: 90 CAPSULE | Refills: 0 | Status: SHIPPED | OUTPATIENT
Start: 2021-03-25 | End: 2021-06-30

## 2021-05-06 DIAGNOSIS — J45.20 MILD INTERMITTENT ASTHMA WITHOUT COMPLICATION: ICD-10-CM

## 2021-05-06 RX ORDER — MONTELUKAST SODIUM 10 MG/1
TABLET ORAL
Qty: 180 TABLET | Refills: 0 | Status: SHIPPED | OUTPATIENT
Start: 2021-05-06 | End: 2021-07-20

## 2021-05-13 DIAGNOSIS — J45.20 MILD INTERMITTENT ASTHMA WITHOUT COMPLICATION: ICD-10-CM

## 2021-05-18 RX ORDER — ALBUTEROL SULFATE 90 UG/1
2 AEROSOL, METERED RESPIRATORY (INHALATION) EVERY 6 HOURS PRN
Qty: 36 G | Refills: 0 | Status: SHIPPED | OUTPATIENT
Start: 2021-05-18 | End: 2021-06-12

## 2021-06-05 DIAGNOSIS — J45.20 MILD INTERMITTENT ASTHMA WITHOUT COMPLICATION: ICD-10-CM

## 2021-06-12 RX ORDER — ALBUTEROL SULFATE 90 UG/1
2 AEROSOL, METERED RESPIRATORY (INHALATION) EVERY 6 HOURS PRN
Qty: 36 G | Refills: 0 | Status: SHIPPED | OUTPATIENT
Start: 2021-06-12 | End: 2021-08-09

## 2021-06-18 DIAGNOSIS — G43.909 MIGRAINE: ICD-10-CM

## 2021-06-30 RX ORDER — PROPRANOLOL HCL 60 MG
60 CAPSULE, EXTENDED RELEASE 24HR ORAL DAILY
Qty: 90 CAPSULE | Refills: 0 | Status: SHIPPED | OUTPATIENT
Start: 2021-06-30 | End: 2021-09-24

## 2021-07-16 DIAGNOSIS — J45.20 MILD INTERMITTENT ASTHMA WITHOUT COMPLICATION: ICD-10-CM

## 2021-07-20 RX ORDER — MONTELUKAST SODIUM 10 MG/1
TABLET ORAL
Qty: 180 TABLET | Refills: 0 | Status: SHIPPED | OUTPATIENT
Start: 2021-07-20 | End: 2021-10-04

## 2021-08-09 DIAGNOSIS — J45.20 MILD INTERMITTENT ASTHMA WITHOUT COMPLICATION: ICD-10-CM

## 2021-08-09 RX ORDER — ALBUTEROL SULFATE 90 UG/1
2 AEROSOL, METERED RESPIRATORY (INHALATION) EVERY 6 HOURS PRN
Qty: 36 G | Refills: 0 | Status: SHIPPED | OUTPATIENT
Start: 2021-08-09 | End: 2021-09-20

## 2021-08-24 ENCOUNTER — OFFICE VISIT (OUTPATIENT)
Dept: INTERNAL MEDICINE CLINIC | Facility: CLINIC | Age: 36
End: 2021-08-24

## 2021-08-24 VITALS
DIASTOLIC BLOOD PRESSURE: 85 MMHG | SYSTOLIC BLOOD PRESSURE: 123 MMHG | BODY MASS INDEX: 28.84 KG/M2 | TEMPERATURE: 98.3 F | HEART RATE: 90 BPM | WEIGHT: 162.8 LBS | OXYGEN SATURATION: 99 %

## 2021-08-24 DIAGNOSIS — N64.4 BREAST PAIN, RIGHT: Primary | ICD-10-CM

## 2021-08-24 DIAGNOSIS — Z87.898 HISTORY OF NIPPLE DISCHARGE: ICD-10-CM

## 2021-08-24 DIAGNOSIS — N61.0 MASTITIS: ICD-10-CM

## 2021-08-24 PROCEDURE — 99213 OFFICE O/P EST LOW 20 MIN: CPT | Performed by: PHYSICIAN ASSISTANT

## 2021-08-24 RX ORDER — IBUPROFEN 600 MG/1
600 TABLET ORAL EVERY 8 HOURS PRN
Qty: 30 TABLET | Refills: 0 | Status: SHIPPED | OUTPATIENT
Start: 2021-08-24 | End: 2021-08-24

## 2021-08-24 RX ORDER — CEPHALEXIN 500 MG/1
500 CAPSULE ORAL EVERY 8 HOURS SCHEDULED
Qty: 30 CAPSULE | Refills: 0 | Status: SHIPPED | OUTPATIENT
Start: 2021-08-24 | End: 2021-09-03

## 2021-08-24 RX ORDER — IBUPROFEN 600 MG/1
600 TABLET ORAL EVERY 8 HOURS PRN
Qty: 30 TABLET | Refills: 0
Start: 2021-08-24 | End: 2021-09-03

## 2021-08-24 NOTE — PATIENT INSTRUCTIONS
On your visit today we discussed that you have been experiencing pain in your right breast   Initially started 1 month ago with  discharge that resolved and then 3 days ago significant pain and hard lump behind your right nipple   You report you did have something similar approximately 5 years ago had imaging and was told it was normal and has not reoccurred until this past 1 month  This can be due to a cyst, abscess or blocked duct but also need to rule out breast cancer  We did discuss based on your history and examination most consistent with mastitis but with the significant pain, discharge and warmth concern for abscess as well  We did confirm no known family history of breast cancer  Please schedule the diagnostic imaging  You may use warm compress to the area it to help with some of the pain as well  Antibiotic has been sent to the pharmacy as it does appear you have had this in the past and can be a bacterial infection  Please take the antibiotic 1 tablet 3 times daily for 10 days  ibuprofen for inflammation and pain has also been sent to her pharmacy  One tablet up to 3 times daily with food  Will get you scheduled for a follow-up visit to re-evaluate your symptoms as well

## 2021-08-24 NOTE — PROGRESS NOTES
Assessment/Plan: On your visit today we discussed that you have been experiencing pain in your right breast   Initially started 1 month ago with  discharge that resolved and then 3 days ago significant pain and hard lump behind your right nipple   You report you did have something similar approximately 5 years ago had imaging and was told it was normal and has not reoccurred until this past 1 month  This can be due to a cyst, abscess or blocked duct but also need to rule out breast cancer  We did discuss based on your history and examination most consistent with mastitis but with the significant pain, discharge and warmth concern for abscess as well  We did confirm no known family history of breast cancer  Please schedule the diagnostic imaging  You may use warm compress to the area it to help with some of the pain as well  Antibiotic has been sent to the pharmacy as it does appear you have had this in the past and can be a bacterial infection  Please take the antibiotic 1 tablet 3 times daily for 10 days  ibuprofen for inflammation and pain has also been sent to her pharmacy  One tablet up to 3 times daily with food  Will get you scheduled for a follow-up visit to re-evaluate your symptoms as well  No problem-specific Assessment & Plan notes found for this encounter  Diagnoses and all orders for this visit:    Breast pain, right  -     US breast right limited (diagnostic); Future  -     Mammo diagnostic right w cad; Future  -     ibuprofen (MOTRIN) 600 mg tablet; Take 1 tablet (600 mg total) by mouth every 8 (eight) hours as needed for fever or headaches for up to 10 days    History of nipple discharge  -     US breast right limited (diagnostic); Future  -     Mammo diagnostic right w cad; Future  -     cephalexin (KEFLEX) 500 mg capsule; Take 1 capsule (500 mg total) by mouth every 8 (eight) hours for 10 days    Mastitis  -     cephalexin (KEFLEX) 500 mg capsule;  Take 1 capsule (500 mg total) by mouth every 8 (eight) hours for 10 days  -     ibuprofen (MOTRIN) 600 mg tablet; Take 1 tablet (600 mg total) by mouth every 8 (eight) hours as needed for fever or headaches for up to 10 days    Other orders  -     Discontinue: ibuprofen (MOTRIN) 600 mg tablet; Take 1 tablet (600 mg total) by mouth every 8 (eight) hours as needed for fever or headaches for up to 10 days          Subjective:      Patient ID: Calvin Everett is a 39 y o  female  Patient presents for [facility] with c/o R breast pain    States 1 month ago had pain and some discharge from R nipple greenish bloody for 1 day resolved then 3-4 days ago R breast pain returned but no discharge  patient reports that her right breast around the nipple is extremely tender and sensitive to touch  Also she reports that it feels hot and a throbbing sensation  States had similar thinks about 5 years ago, states sent for testing  Not sure but she believe she was told there was no problem  She reports this is similar however much more significant with discharge and pain  Review of patient's medical record however does show a historical breast ultrasound with findings consistent for mastitis  Patient is not currently pregnant or breastfeeding  No pain on L breast today but sometimes when has period  LMP was 8/2/2021 lasted 5 days and is her usual    No FH breast cancer  No change to  meds on Cymbalta, seroquel long term      Based on examination and history patient likely has an infection and abscess given the fact that it is tender and warm and she reports episode of pus with some blood discharge recently  Still needs diagnostic imaging to rule out breast cancer  Will place on 10 day course of antibiotic, advised on warm compress, antiinflammatories, order diagnostic imaging and have patient followed up regarding her symptoms                  The following portions of the patient's history were reviewed and updated as appropriate: allergies, current medications, past family history, past medical history, past social history, past surgical history and problem list     Review of Systems   Constitutional: Negative for chills, fever and unexpected weight change (states fluctuated but no loss)  Respiratory: Negative  Cardiovascular: Negative  Gastrointestinal: Negative for nausea and vomiting  Endocrine: Negative for cold intolerance and heat intolerance  Genitourinary:        As noted pain discharge R breast   Skin: Negative for rash  Objective:      /85 (BP Location: Left arm, Patient Position: Sitting, Cuff Size: Standard)   Pulse 90   Temp 98 3 °F (36 8 °C) (Temporal)   Wt 73 8 kg (162 lb 12 8 oz)   SpO2 99%   BMI 28 84 kg/m²          Physical Exam  Vitals and nursing note reviewed  Exam conducted with a chaperone present  Constitutional:       General: She is not in acute distress  Cardiovascular:      Rate and Rhythm: Normal rate and regular rhythm  Heart sounds: Normal heart sounds  Pulmonary:      Effort: Pulmonary effort is normal       Breath sounds: Normal breath sounds  Chest:      Breasts:         Right: Swelling and tenderness present  No nipple discharge or skin change  Left: Normal       Comments: On examination patient does have firm exquisitely tender mass verses cystic structure just behind the areola  No discharge from breast today  Indurated area is warm to touch  No additional masses or lymphadenopathy noted  Left breast exam normal   Lymphadenopathy:      Upper Body:      Right upper body: No supraclavicular or axillary adenopathy  Left upper body: No supraclavicular or axillary adenopathy  Neurological:      Mental Status: She is alert

## 2021-09-02 ENCOUNTER — OFFICE VISIT (OUTPATIENT)
Dept: INTERNAL MEDICINE CLINIC | Facility: CLINIC | Age: 36
End: 2021-09-02

## 2021-09-02 VITALS
SYSTOLIC BLOOD PRESSURE: 108 MMHG | TEMPERATURE: 98 F | BODY MASS INDEX: 28.95 KG/M2 | DIASTOLIC BLOOD PRESSURE: 73 MMHG | WEIGHT: 163.4 LBS | HEART RATE: 96 BPM

## 2021-09-02 DIAGNOSIS — N61.0 MASTITIS: Primary | ICD-10-CM

## 2021-09-02 PROCEDURE — 99213 OFFICE O/P EST LOW 20 MIN: CPT | Performed by: HOSPITALIST

## 2021-09-02 NOTE — PROGRESS NOTES
[facility]  INTERNAL MEDICINE OFFICE VISIT     PATIENT INFORMATION     Calvin Everett   39 y o  female   MRN: [id number]    ASSESSMENT/PLAN     Diagnoses and all orders for this visit:    Mastitis  Seen on 08/24 for one-month history of right breast pain, developed right nipple discharge 1 day prior to presentation  Concern for mastitis versus breast abscess vs cyst vs malignant lesion  No breast trauma, cracked nipples, not breastfeeding, no FHx of breast cancer  Ordered diagnostic ultrasound and mammogram-scheduled for 9/9  Prescribed ibuprofen and Keflex course  Symptoms has much improved  · Complete antibiotic course  · Continue ibuprofen as needed, advised to take on full stomach  Can use warm compresses  · Complete ultrasound and mammogram as ordered  Follow-up with PCP in 6 months for annual physical, sooner if symptoms did not resolve/worsened  HEALTH MAINTENANCE     Immunization History   Administered Date(s) Administered    INFLUENZA 11/14/2014, 01/06/2017, 01/06/2017, 01/03/2019    Influenza Quadrivalent 3 years and older 01/06/2017    Influenza, injectable, quadrivalent, preservative free 0 5 mL 01/03/2019    Influenza, recombinant, quadrivalent,injectable, preservative free 10/21/2020    Pneumococcal Polysaccharide PPV23 09/18/2015    Tdap 09/18/2015    Tuberculin Skin Test-PPD Intradermal 03/16/2015, 02/05/2019       CHIEF COMPLAINT     Chief Complaint   Patient presents with    Follow-up     follow up right breast abcess      HISTORY OF PRESENT ILLNESS     Patient is a 39years old female with past medical history of depression/anxiety and migraines presented to the clinic today for one-week follow-up for breast pain  Seen on 8/24, had 1 month complain of right breast pain, 1 day of greenish discharge from the right nipple  Exam was significant for tender, warm, indurated periareolar area and firm tender mass behind the areola    Prescribed Motrin and Keflex course  Ultrasound and mammogram ordered, both not completed yet scheduled for 9/9  Today she reports feeling better  Taking antibiotics, and analgesia as needed  No pain discharge  Not breastfeeding, no family history breast cancer  REVIEW OF SYSTEMS     Review of Systems   All other systems reviewed and are negative  OBJECTIVE     Vitals:    09/02/21 1422   BP: 108/73   BP Location: Right arm   Patient Position: Sitting   Cuff Size: Standard   Pulse: 96   Temp: 98 °F (36 7 °C)   TempSrc: Temporal   Weight: 74 1 kg (163 lb 6 4 oz)     Physical Exam  Vitals and nursing note reviewed  Constitutional:       General: She is not in acute distress  Appearance: She is well-developed  HENT:      Head: Normocephalic and atraumatic  Eyes:      Conjunctiva/sclera: Conjunctivae normal    Cardiovascular:      Rate and Rhythm: Normal rate and regular rhythm  Heart sounds: No murmur heard  Pulmonary:      Effort: Pulmonary effort is normal  No respiratory distress  Breath sounds: Normal breath sounds  Chest:      Breasts:         Right: Mass (Firm, nontender, freely mobile subareolar mass) present  No bleeding, inverted nipple, nipple discharge, skin change or tenderness  Left: Normal    Abdominal:      Palpations: Abdomen is soft  Tenderness: There is no abdominal tenderness  Musculoskeletal:      Cervical back: Neck supple  Lymphadenopathy:      Upper Body:      Right upper body: No axillary adenopathy  Skin:     General: Skin is warm and dry  Neurological:      General: No focal deficit present  Mental Status: She is alert and oriented to person, place, and time         CURRENT MEDICATIONS     Current Outpatient Medications:     acetaminophen (TYLENOL) 650 mg CR tablet, TAKE 1 TABLET (650 MG TOTAL) BY MOUTH EVERY 8 (EIGHT) HOURS AS NEEDED FOR MILD PAIN, Disp: 30 tablet, Rfl: 0    albuterol (PROVENTIL HFA,VENTOLIN HFA) 90 mcg/act inhaler, INHALE 2 PUFFS EVERY 6 (SIX) HOURS AS NEEDED FOR WHEEZING, Disp: 36 g, Rfl: 0    cephalexin (KEFLEX) 500 mg capsule, Take 1 capsule (500 mg total) by mouth every 8 (eight) hours for 10 days, Disp: 30 capsule, Rfl: 0    dicyclomine (BENTYL) 10 mg capsule, TAKE 2 CAPSULES BY MOUTH 3 (THREE) TIMES A DAY AS NEEDED FOR (NAUSEA, ABDOMINAL PAIN), Disp: 90 capsule, Rfl: 0    DULoxetine (CYMBALTA) 30 mg delayed release capsule, 60 mg, Disp: , Rfl: 0    fluticasone (FLONASE) 50 mcg/act nasal spray, 2 sprays into each nostril continuous as needed for rhinitis, Disp: 2 Bottle, Rfl: 0    gabapentin (NEURONTIN) 600 MG tablet, Take 600 mg by mouth 2 (two) times a day, Disp: , Rfl:     ibuprofen (MOTRIN) 600 mg tablet, Take 1 tablet (600 mg total) by mouth every 8 (eight) hours as needed for fever or headaches for up to 10 days, Disp: 30 tablet, Rfl: 0    montelukast (SINGULAIR) 10 mg tablet, TAKE 1 TABLET POR VIA ORAL DIARIAMENTE, Disp: 180 tablet, Rfl: 0    propranolol (INDERAL LA) 60 mg 24 hr capsule, TAKE 1 CAPSULE (60 MG TOTAL) BY MOUTH DAILY, Disp: 90 capsule, Rfl: 0    QUEtiapine (SEROquel) 400 MG tablet, Take 400 mg by mouth daily at bedtime, Disp: , Rfl:     methocarbamol (ROBAXIN) 500 mg tablet, Take 1 tablet (500 mg total) by mouth every 8 (eight) hours as needed for muscle spasms for up to 7 days (Patient not taking: Reported on 10/21/2020), Disp: 21 tablet, Rfl: 0    Riboflavin (VITAMIN B-2) 100 MG TABS, 100mg tablets; 4x/day (Patient not taking: Reported on 10/21/2020), Disp: 360 each, Rfl: 0    Spacer/Aero-Holding Chambers (PRO COMFORT SPACER ADULT) MISC, USE AS DIRECTED (Patient not taking: Reported on 10/21/2020), Disp: 1 each, Rfl: 0    PAST MEDICAL & SURGICAL HISTORY     Past Medical History:   Diagnosis Date    Asthma     Chlamydia infection     Depression     IBS (irritable bowel syndrome)     Migraine     Varicose veins of both lower extremities      Past Surgical History:   Procedure Laterality Date    COLPOSCOPY       SOCIAL & FAMILY HISTORY     Social History     Socioeconomic History    Marital status: Single     Spouse name: Not on file    Number of children: Not on file    Years of education: Not on file    Highest education level: Not on file   Occupational History    Not on file   Tobacco Use    Smoking status: Current Every Day Smoker     Packs/day: 1 00     Years: 15 00     Pack years: 15 00    Smokeless tobacco: Never Used   Vaping Use    Vaping Use: Never used   Substance and Sexual Activity    Alcohol use: Yes     Comment: social    Drug use: Never    Sexual activity: Yes     Partners: Male     Birth control/protection: None     Comment: trying to have a baby   Other Topics Concern    Not on file   Social History Narrative    Not on file     Social Determinants of Health     Financial Resource Strain:     Difficulty of Paying Living Expenses:    Food Insecurity:     Worried About Running Out of Food in the Last Year:     Ran Out of Food in the Last Year:    Transportation Needs:     Lack of Transportation (Medical):      Lack of Transportation (Non-Medical):    Physical Activity:     Days of Exercise per Week:     Minutes of Exercise per Session:    Stress:     Feeling of Stress :    Social Connections:     Frequency of Communication with Friends and Family:     Frequency of Social Gatherings with Friends and Family:     Attends Denominational Services:     Active Member of Clubs or Organizations:     Attends Club or Organization Meetings:     Marital Status:    Intimate Partner Violence:     Fear of Current or Ex-Partner:     Emotionally Abused:     Physically Abused:     Sexually Abused:      Social History     Substance and Sexual Activity   Alcohol Use Yes    Comment: social     Social History     Substance and Sexual Activity   Drug Use Never     Social History     Tobacco Use   Smoking Status Current Every Day Smoker    Packs/day: 1 00    Years: 15 00    Pack years: 15 00   Smokeless Tobacco Never Used     Family History   Problem Relation Age of Onset    Diabetes Brother     No Known Problems Mother     Cirrhosis Father     No Known Problems Sister     Autism Son     No Known Problems Brother     Depression Son      ==  Ranjan Avery MD  Internal Medicine Residency, PGY-3  [address] [address] [address]   [facility] , [address] [address] [facility], [address] [facility]  Office: [phone]  Fax: [phone]

## 2021-09-24 DIAGNOSIS — G43.909 MIGRAINE: ICD-10-CM

## 2021-09-24 RX ORDER — PROPRANOLOL HCL 60 MG
60 CAPSULE, EXTENDED RELEASE 24HR ORAL DAILY
Qty: 90 CAPSULE | Refills: 0 | Status: SHIPPED | OUTPATIENT
Start: 2021-09-24 | End: 2021-12-20

## 2021-10-04 DIAGNOSIS — J45.20 MILD INTERMITTENT ASTHMA WITHOUT COMPLICATION: ICD-10-CM

## 2021-10-04 RX ORDER — MONTELUKAST SODIUM 10 MG/1
TABLET ORAL
Qty: 180 TABLET | Refills: 0 | Status: SHIPPED | OUTPATIENT
Start: 2021-10-04 | End: 2022-04-21

## 2021-11-18 ENCOUNTER — HOSPITAL ENCOUNTER (EMERGENCY)
Facility: HOSPITAL | Age: 36
Discharge: HOME | End: 2021-11-18
Attending: EMERGENCY MEDICINE | Admitting: EMERGENCY MEDICINE
Payer: COMMERCIAL

## 2021-11-18 VITALS
OXYGEN SATURATION: 96 % | RESPIRATION RATE: 16 BRPM | TEMPERATURE: 98.5 F | SYSTOLIC BLOOD PRESSURE: 107 MMHG | DIASTOLIC BLOOD PRESSURE: 62 MMHG | HEART RATE: 72 BPM

## 2021-11-18 DIAGNOSIS — R42 VERTIGO: Primary | ICD-10-CM

## 2021-11-18 DIAGNOSIS — R42 LIGHTHEADEDNESS: ICD-10-CM

## 2021-11-18 LAB
BACTERIA UR QL AUTO: ABNORMAL /HPF
BILIRUB UR QL STRIP: NEGATIVE
CLARITY UR: CLEAR
COLOR UR: YELLOW
GLUCOSE UR STRIP-MCNC: NEGATIVE MG/DL
HGB UR QL STRIP.AUTO: ABNORMAL
HYALINE CASTS #/AREA URNS LPF: ABNORMAL /LPF
KETONES UR STRIP-MCNC: NEGATIVE MG/DL
LEUKOCYTE ESTERASE UR QL STRIP: ABNORMAL
NITRITE UR QL STRIP: NEGATIVE
NON-SQ EPI CELLS URNS QL MICRO: ABNORMAL /HPF
PH UR STRIP.AUTO: 5 [PH] (ref 4.5–8)
PROT UR STRIP-MCNC: NEGATIVE MG/DL
RBC #/AREA URNS AUTO: ABNORMAL /HPF
SP GR UR STRIP.AUTO: >=1.03 (ref 1–1.03)
UROBILINOGEN UR QL STRIP.AUTO: 0.2 E.U./DL
WBC #/AREA URNS AUTO: ABNORMAL /HPF

## 2021-11-18 PROCEDURE — 99284 EMERGENCY DEPT VISIT MOD MDM: CPT

## 2021-11-18 PROCEDURE — U0003 INFECTIOUS AGENT DETECTION BY NUCLEIC ACID (DNA OR RNA); SEVERE ACUTE RESPIRATORY SYNDROME CORONAVIRUS 2 (SARS-COV-2) (CORONAVIRUS DISEASE [COVID-19]), AMPLIFIED PROBE TECHNIQUE, MAKING USE OF HIGH THROUGHPUT TECHNOLOGIES AS DESCRIBED BY CMS-2020-01-R: HCPCS

## 2021-11-18 PROCEDURE — 96372 THER/PROPH/DIAG INJ SC/IM: CPT

## 2021-11-18 PROCEDURE — U0005 INFEC AGEN DETEC AMPLI PROBE: HCPCS

## 2021-11-18 PROCEDURE — 81001 URINALYSIS AUTO W/SCOPE: CPT

## 2021-11-18 PROCEDURE — 99284 EMERGENCY DEPT VISIT MOD MDM: CPT | Performed by: EMERGENCY MEDICINE

## 2021-11-18 RX ORDER — KETOROLAC TROMETHAMINE 30 MG/ML
15 INJECTION, SOLUTION INTRAMUSCULAR; INTRAVENOUS ONCE
Status: COMPLETED | OUTPATIENT
Start: 2021-11-18 | End: 2021-11-18

## 2021-11-18 RX ORDER — ACETAMINOPHEN 325 MG/1
975 TABLET ORAL ONCE
Status: COMPLETED | OUTPATIENT
Start: 2021-11-18 | End: 2021-11-18

## 2021-11-18 RX ORDER — MECLIZINE HYDROCHLORIDE 25 MG/1
25 TABLET ORAL ONCE
Status: COMPLETED | OUTPATIENT
Start: 2021-11-18 | End: 2021-11-18

## 2021-11-18 RX ORDER — MECLIZINE HYDROCHLORIDE 25 MG/1
25 TABLET ORAL 3 TIMES DAILY PRN
Qty: 30 TABLET | Refills: 0 | Status: SHIPPED | OUTPATIENT
Start: 2021-11-18

## 2021-11-18 RX ADMIN — MECLIZINE HYDROCHLORIDE 25 MG: 25 TABLET ORAL at 21:29

## 2021-11-18 RX ADMIN — ACETAMINOPHEN 975 MG: 325 TABLET, FILM COATED ORAL at 21:29

## 2021-11-18 RX ADMIN — KETOROLAC TROMETHAMINE 15 MG: 30 INJECTION, SOLUTION INTRAMUSCULAR at 21:30

## 2021-11-19 LAB — SARS-COV-2 RNA RESP QL NAA+PROBE: NEGATIVE

## 2021-12-02 ENCOUNTER — HOSPITAL ENCOUNTER (OUTPATIENT)
Dept: MAMMOGRAPHY | Facility: CLINIC | Age: 36
Discharge: HOME | End: 2021-12-02
Payer: COMMERCIAL

## 2021-12-02 VITALS — BODY MASS INDEX: 28.88 KG/M2 | WEIGHT: 163 LBS | HEIGHT: 63 IN

## 2021-12-02 DIAGNOSIS — N64.4 BREAST PAIN, RIGHT: ICD-10-CM

## 2021-12-02 DIAGNOSIS — Z87.898 HISTORY OF NIPPLE DISCHARGE: ICD-10-CM

## 2021-12-02 PROCEDURE — G0279 TOMOSYNTHESIS, MAMMO: HCPCS

## 2021-12-02 PROCEDURE — 77066 DX MAMMO INCL CAD BI: CPT

## 2021-12-02 PROCEDURE — 76642 ULTRASOUND BREAST LIMITED: CPT

## 2021-12-06 ENCOUNTER — TELEPHONE (OUTPATIENT)
Dept: INTERNAL MEDICINE CLINIC | Facility: CLINIC | Age: 36
End: 2021-12-06

## 2022-04-21 DIAGNOSIS — J45.20 MILD INTERMITTENT ASTHMA WITHOUT COMPLICATION: ICD-10-CM

## 2022-04-21 RX ORDER — MONTELUKAST SODIUM 10 MG/1
TABLET ORAL
Qty: 180 TABLET | Refills: 0 | Status: SHIPPED | OUTPATIENT
Start: 2022-04-21

## 2022-07-05 DIAGNOSIS — J45.20 MILD INTERMITTENT ASTHMA WITHOUT COMPLICATION: ICD-10-CM

## 2022-07-05 RX ORDER — ALBUTEROL SULFATE 90 UG/1
2 AEROSOL, METERED RESPIRATORY (INHALATION) EVERY 6 HOURS PRN
Qty: 36 G | Refills: 3 | Status: SHIPPED | OUTPATIENT
Start: 2022-07-05

## 2022-08-17 ENCOUNTER — HOSPITAL ENCOUNTER (EMERGENCY)
Facility: HOSPITAL | Age: 37
Discharge: HOME | End: 2022-08-17
Attending: EMERGENCY MEDICINE
Payer: COMMERCIAL

## 2022-08-17 VITALS
TEMPERATURE: 97.5 F | RESPIRATION RATE: 16 BRPM | HEART RATE: 71 BPM | SYSTOLIC BLOOD PRESSURE: 115 MMHG | OXYGEN SATURATION: 98 % | DIASTOLIC BLOOD PRESSURE: 76 MMHG

## 2022-08-17 DIAGNOSIS — R52 PAIN: Primary | ICD-10-CM

## 2022-08-17 PROCEDURE — 99285 EMERGENCY DEPT VISIT HI MDM: CPT | Performed by: EMERGENCY MEDICINE

## 2022-08-17 PROCEDURE — 96374 THER/PROPH/DIAG INJ IV PUSH: CPT

## 2022-08-17 PROCEDURE — 96375 TX/PRO/DX INJ NEW DRUG ADDON: CPT

## 2022-08-17 PROCEDURE — 96372 THER/PROPH/DIAG INJ SC/IM: CPT

## 2022-08-17 PROCEDURE — 96361 HYDRATE IV INFUSION ADD-ON: CPT

## 2022-08-17 PROCEDURE — 93005 ELECTROCARDIOGRAM TRACING: CPT

## 2022-08-17 PROCEDURE — 99283 EMERGENCY DEPT VISIT LOW MDM: CPT

## 2022-08-17 RX ORDER — LIDOCAINE 50 MG/G
1 PATCH TOPICAL ONCE
Status: DISCONTINUED | OUTPATIENT
Start: 2022-08-17 | End: 2022-08-17 | Stop reason: HOSPADM

## 2022-08-17 RX ORDER — KETOROLAC TROMETHAMINE 30 MG/ML
15 INJECTION, SOLUTION INTRAMUSCULAR; INTRAVENOUS ONCE
Status: COMPLETED | OUTPATIENT
Start: 2022-08-17 | End: 2022-08-17

## 2022-08-17 RX ORDER — HALOPERIDOL 5 MG/ML
5 INJECTION INTRAMUSCULAR ONCE
Status: COMPLETED | OUTPATIENT
Start: 2022-08-17 | End: 2022-08-17

## 2022-08-17 RX ORDER — METOCLOPRAMIDE HYDROCHLORIDE 5 MG/ML
10 INJECTION INTRAMUSCULAR; INTRAVENOUS ONCE
Status: COMPLETED | OUTPATIENT
Start: 2022-08-17 | End: 2022-08-17

## 2022-08-17 RX ORDER — ACETAMINOPHEN 325 MG/1
975 TABLET ORAL ONCE
Status: COMPLETED | OUTPATIENT
Start: 2022-08-17 | End: 2022-08-17

## 2022-08-17 RX ADMIN — ACETAMINOPHEN 975 MG: 325 TABLET ORAL at 19:01

## 2022-08-17 RX ADMIN — LIDOCAINE 5% 1 PATCH: 700 PATCH TOPICAL at 20:11

## 2022-08-17 RX ADMIN — HALOPERIDOL LACTATE 5 MG: 5 INJECTION, SOLUTION INTRAMUSCULAR at 20:29

## 2022-08-17 RX ADMIN — METOCLOPRAMIDE HYDROCHLORIDE 10 MG: 5 INJECTION INTRAMUSCULAR; INTRAVENOUS at 19:03

## 2022-08-17 RX ADMIN — KETOROLAC TROMETHAMINE 15 MG: 30 INJECTION, SOLUTION INTRAMUSCULAR; INTRAVENOUS at 19:05

## 2022-08-17 RX ADMIN — SODIUM CHLORIDE 1000 ML: 0.9 INJECTION, SOLUTION INTRAVENOUS at 19:13

## 2022-08-17 NOTE — ED PROVIDER NOTES
History  Chief Complaint   Patient presents with    Pain     Pt reports pain on whole L side of body for 3 weeks, pt has not taken any pain medication to relieve pain     [age]-year-old female with a past medical history of asthma, anxiety, depression, IBS, and migraines presents with pain  She states that she has pain on the entire left side of her body including her head, neck, arm, side, and leg  She states that this began 3 weeks ago and has not gotten better or worse  It waxes and wanes in intensity  Nothing makes it better worse  The pain is not positional, exertional, or seemingly related to anything else  Patient notes that she has had this happen to her in the past in her doctor has told her that it is related to anxiety  Patient does report that her brother passed away a couple of months ago and this has been very stressful  She has never had any kind of neurologic imaging  Patient reports no associated symptoms such as vision changes, trouble breathing, fever, chills, weakness, or numbness/tingling  Patient takes scheduled anxiety medications, but has no abortive anxiety medications to try  She has not tried pain medications for the symptoms  She states that this does not feel like her typical migraines  Prior to Admission Medications   Prescriptions Last Dose Informant Patient Reported? Taking?    DULoxetine (CYMBALTA) 30 mg delayed release capsule   Yes No   Si mg   QUEtiapine (SEROquel) 400 MG tablet   Yes No   Sig: Take 400 mg by mouth daily at bedtime   Riboflavin (VITAMIN B-2) 100 MG TABS   No No   Simg tablets; 4x/day   Patient not taking: Reported on 10/21/2020   Spacer/Aero-Holding Chambers (PRO COMFORT SPACER ADULT) MISC   No No   Sig: USE AS DIRECTED   Patient not taking: Reported on 10/21/2020   acetaminophen (TYLENOL) 650 mg CR tablet   No No   Sig: TAKE 1 TABLET (650 MG TOTAL) BY MOUTH EVERY 8 (EIGHT) HOURS AS NEEDED FOR MILD PAIN   albuterol (PROVENTIL HFA,VENTOLIN HFA) 90 mcg/act inhaler   No No   Sig: INHALE 2 PUFFS EVERY 6 (SIX) HOURS AS NEEDED FOR WHEEZING   dicyclomine (BENTYL) 10 mg capsule   No No   Sig: TAKE 2 CAPSULES BY MOUTH 3 (THREE) TIMES A DAY AS NEEDED FOR (NAUSEA, ABDOMINAL PAIN)   fluticasone (FLONASE) 50 mcg/act nasal spray   No No   Si sprays into each nostril continuous as needed for rhinitis   gabapentin (NEURONTIN) 600 MG tablet   Yes No   Sig: Take 600 mg by mouth 2 (two) times a day   ibuprofen (MOTRIN) 600 mg tablet   No No   Sig: Take 1 tablet (600 mg total) by mouth every 8 (eight) hours as needed for fever or headaches for up to 10 days   meclizine (ANTIVERT) 25 mg tablet   No No   Sig: Take 1 tablet (25 mg total) by mouth 3 (three) times a day as needed for dizziness   methocarbamol (ROBAXIN) 500 mg tablet   No No   Sig: Take 1 tablet (500 mg total) by mouth every 8 (eight) hours as needed for muscle spasms for up to 7 days   Patient not taking: Reported on 10/21/2020   montelukast (SINGULAIR) 10 mg tablet   No No   Sig: TAKE 1 TABLET POR VIA ORAL DIARIAMENTE   propranolol (INDERAL LA) 60 mg 24 hr capsule   No No   Sig: TAKE 1 CAPSULE (60 MG TOTAL) BY MOUTH DAILY      Facility-Administered Medications: None       Past Medical History:   Diagnosis Date    Asthma     Chlamydia infection     Depression     IBS (irritable bowel syndrome)     Migraine     Varicose veins of both lower extremities        Past Surgical History:   Procedure Laterality Date    COLPOSCOPY         Family History   Problem Relation Age of Onset    Diabetes Brother     No Known Problems Mother     Cirrhosis Father     No Known Problems Sister     Autism Son     No Known Problems Maternal Grandmother     No Known Problems Maternal Grandfather     No Known Problems Paternal Grandmother     No Known Problems Paternal Grandfather     No Known Problems Brother     Depression Son     No Known Problems Cousin     Colon cancer Maternal Uncle      I have reviewed and agree with the history as documented  E-Cigarette/Vaping    E-Cigarette Use Never User      E-Cigarette/Vaping Substances    Nicotine No     THC No     CBD No     Flavoring No     Other No     Unknown No      Social History     Tobacco Use    Smoking status: Current Every Day Smoker     Packs/day: 1 00     Years: 15 00     Pack years: 15 00    Smokeless tobacco: Never Used   Vaping Use    Vaping Use: Never used   Substance Use Topics    Alcohol use: Yes     Comment: social    Drug use: Never        Review of Systems   Constitutional: Negative for chills, fatigue and fever  HENT: Negative for congestion, rhinorrhea and sore throat  Eyes: Negative for pain and redness  Respiratory: Negative for cough, chest tightness, shortness of breath and wheezing  Cardiovascular: Negative for palpitations and leg swelling  Gastrointestinal: Negative for abdominal pain, diarrhea, nausea and vomiting  Endocrine: Negative  Genitourinary: Negative for difficulty urinating and hematuria  Musculoskeletal: Negative for gait problem, joint swelling and myalgias  Skin: Negative for pallor and rash  Allergic/Immunologic: Negative  Neurological: Negative for dizziness, weakness, light-headedness, numbness and headaches  Hematological: Negative  Physical Exam  ED Triage Vitals [08/17/22 1710]   Temperature Pulse Respirations Blood Pressure SpO2   97 5 °F (36 4 °C) 75 20 131/81 100 %      Temp Source Heart Rate Source Patient Position - Orthostatic VS BP Location FiO2 (%)   Oral Monitor Sitting Right arm --      Pain Score       9             Orthostatic Vital Signs  Vitals:    08/17/22 1710 08/17/22 1815   BP: 131/81 115/76   Pulse: 75 71   Patient Position - Orthostatic VS: Sitting Sitting       Physical Exam  Vitals and nursing note reviewed  Constitutional:       General: She is not in acute distress  Appearance: Normal appearance  She is not ill-appearing     HENT: Head: Normocephalic and atraumatic  Eyes:      Conjunctiva/sclera: Conjunctivae normal    Cardiovascular:      Rate and Rhythm: Normal rate and regular rhythm  Heart sounds: No murmur heard  Pulmonary:      Effort: Pulmonary effort is normal  No respiratory distress  Breath sounds: Normal breath sounds  No wheezing, rhonchi or rales  Abdominal:      General: Abdomen is flat  There is no distension  Palpations: Abdomen is soft  Tenderness: There is no abdominal tenderness  There is no guarding or rebound  Musculoskeletal:         General: Normal range of motion  Cervical back: Normal range of motion and neck supple  Skin:     General: Skin is warm and dry  Neurological:      General: No focal deficit present  Mental Status: She is alert and oriented to person, place, and time  Cranial Nerves: No cranial nerve deficit  Motor: No weakness  ED Medications  Medications   lidocaine (LIDODERM) 5 % patch 1 patch (1 patch Topical Medication Applied 8/17/22 2011)   sodium chloride 0 9 % bolus 1,000 mL (0 mL Intravenous Stopped 8/17/22 2026)   acetaminophen (TYLENOL) tablet 975 mg (975 mg Oral Given 8/17/22 1901)   ketorolac (TORADOL) injection 15 mg (15 mg Intravenous Given 8/17/22 1905)   metoclopramide (REGLAN) injection 10 mg (10 mg Intravenous Given 8/17/22 1903)   haloperidol lactate (HALDOL) injection 5 mg (5 mg Intramuscular Given 8/17/22 2029)       Diagnostic Studies  Results Reviewed     None                 No orders to display         Procedures  Procedures      ED Course  ED Course as of 08/17/22 2037   Wed Aug 17, 2022   1928 ECG 12 lead  The heart rate is 63, which is normal  The rhythm is regular  The axis is normal  The P waves are normal and the NC interval is normal  The QRS height is normal and width is normal  The ST segments are not elevated or depressed  The T waves are normal  The QT segment is normal  This ecg shows sinus rhythm  1953 Patient reports that her pain is exactly the same  She declined anxiety medications again  Will try other pain medications  Pt has to drive home so she cannot take muscle relaxer  2025 Pt has now decided to get a ride home  Will try haldol and get her a Lyft home  [facility]  Number of Diagnoses or Management Options  Pain: established and improving  Diagnosis management comments: [age]-year-old female presents with left-sided body pain  Exam is normal and there is no concern for neurologic cause  Patient has been told that this is anxiety in the past   She seems stressed over her brother passing away  I discussed trying an anxiety medication for her symptoms, but she declined  Will treat this as a migraine and re-evaluate  Will check an EKG  Amount and/or Complexity of Data Reviewed  Review and summarize past medical records: yes  Discuss the patient with other providers: yes    Risk of Complications, Morbidity, and/or Mortality  Presenting problems: low  Diagnostic procedures: low  Management options: low    Patient Progress  Patient progress: improved    Patient felt better after medications  I still discussed with her that this could be related to anxiety  Recommend follow up with primary care physician  Return precautions given  All questions answered  Disposition  Final diagnoses:   Pain     Time reflects when diagnosis was documented in both MDM as applicable and the Disposition within this note     Time User Action Codes Description Comment    8/17/2022  8:26 PM Jennifer Carrillo Add [R52] Pain       ED Disposition     ED Disposition   Discharge    Condition   Good    Date/Time   Wed Aug 17, 2022  8:25 PM    [address] discharge to home/self care                 Follow-up Information     Follow up With Specialties Details Why Contact Info Additional [address] [facility]  or your doctor [address], [address] [facility] [id number]  [address], [address], [address] [place], [place], [address]          Patient's Medications   Discharge Prescriptions    No medications on file     No discharge procedures on file  PDMP Review     None           ED Provider  Attending physically available and evaluated Vesna Lomeli I managed the patient along with the ED Attending      Electronically Signed by         Varghese Celis DO  08/17/22 2034

## 2022-08-17 NOTE — ED ATTENDING ATTESTATION
8/17/2022  I, Shannon Miranda MD, saw and evaluated the patient  I have discussed the patient with the resident/non-physician practitioner and agree with the resident's/non-physician practitioner's findings, Plan of Care, and MDM as documented in the resident's/non-physician practitioner's note, except where noted  All available labs and Radiology studies were reviewed  I was present for key portions of any procedure(s) performed by the resident/non-physician practitioner and I was immediately available to provide assistance  At this point I agree with the current assessment done in the Emergency Department  I have conducted an independent evaluation of this patient a history and physical is as follows:  Patient with pain in the entire left side of her body for the last 3 weeks  Patient states that it started in her shoulder and neck, then radiated up into her head  Feels like a typical migraine in her head, but patient also has pain in her arm and leg  No numbness, tingling, weakness, fevers  Patient does states that she has chills and nausea, but no vomiting or abdominal pain  No chest pain, but more of a tightness in her shoulder  Review of systems otherwise -12 systems reviewed  On exam the patient is tearful  Vital signs were reviewed  Patient is awake, alert, interactive  The patient's pupils are equally round reactive to light  Oropharynx is clear with moist mucous membranes  Neck is supple and nontender with no adenopathy or JVD  Heart is regular with no murmurs, rubs, or gallops  Lungs are clear and equal with no wheezes, rales, or rhonchi  Abdomen is soft and nontender with no masses, rebound, or guarding  There is no CVA tenderness  The patient was completely exposed  There is no skin breakdown  There are no rashes or skin changes  Extremities are warm and well perfused with good pulses  The patient has normal strength, sensation, and cranial nerves    Patient has normal left shoulder exam, no rashes there  Impression:  Headache, left shoulder pain, left leg pain  No evidence of rash, skin changes, or other pathology    Will plan to treat like migraine, will check EKG given left shoulder pain, will reassess symptoms  ED Course         Critical Care Time  Procedures

## 2022-08-18 LAB
ATRIAL RATE: 63 BPM
P AXIS: -29 DEGREES
PR INTERVAL: 116 MS
QRS AXIS: 66 DEGREES
QRSD INTERVAL: 76 MS
QT INTERVAL: 402 MS
QTC INTERVAL: 411 MS
T WAVE AXIS: 14 DEGREES
VENTRICULAR RATE: 63 BPM

## 2022-08-18 PROCEDURE — 93010 ELECTROCARDIOGRAM REPORT: CPT | Performed by: INTERNAL MEDICINE

## 2022-08-18 NOTE — DISCHARGE INSTRUCTIONS
You have been seen for pain  You should return to the ED if you develop weakness, numbness/tingling, vision changes, facial droop, or other worsening symptoms  Follow up with your primary care physician

## 2022-10-24 DIAGNOSIS — J45.20 MILD INTERMITTENT ASTHMA WITHOUT COMPLICATION: ICD-10-CM

## 2022-10-24 RX ORDER — MONTELUKAST SODIUM 10 MG/1
TABLET ORAL
Qty: 180 TABLET | Refills: 0 | Status: SHIPPED | OUTPATIENT
Start: 2022-10-24

## 2022-12-20 ENCOUNTER — ANNUAL EXAM (OUTPATIENT)
Dept: OBGYN CLINIC | Facility: CLINIC | Age: 37
End: 2022-12-20

## 2022-12-20 VITALS
DIASTOLIC BLOOD PRESSURE: 86 MMHG | WEIGHT: 147 LBS | SYSTOLIC BLOOD PRESSURE: 120 MMHG | HEIGHT: 63 IN | BODY MASS INDEX: 26.05 KG/M2

## 2022-12-20 DIAGNOSIS — N92.6 IRREGULAR BLEEDING: ICD-10-CM

## 2022-12-20 DIAGNOSIS — Z11.3 SCREENING EXAMINATION FOR STD (SEXUALLY TRANSMITTED DISEASE): ICD-10-CM

## 2022-12-20 DIAGNOSIS — Z01.419 WOMEN'S ANNUAL ROUTINE GYNECOLOGICAL EXAMINATION: Primary | ICD-10-CM

## 2022-12-20 LAB — SL AMB POCT URINE HCG: NEGATIVE

## 2022-12-20 NOTE — PROGRESS NOTES
Subjective    HPI:     Berta Hewitt is a 44 y o  female  She is a [place] 3 Para 2, with  x 2  Her menstrual cycles are normally monthly  She states that this month she has bled twice, first on the  and   Then yesterday she started bleeding again  Her current method of contraception includes none  She states that for the last few months she experiences vaginal pain with intimacy  She denies /GI and Gyn complaints  She feels safe at home  She states her depression/anxiety is stable with medication  Medical, surgical and family history reviewed  Her dental care is not done - it's been about a year  She is a heavy smoker  She tries to eat a healthy diet  She does not exercise  She is happy with her weight  Gynecologic History    Patient's last menstrual period was 2022  Last Pap: 19  Results were: normal    Obstetric History    OB History    Para Term  AB Living   3 2 2   1     SAB IAB Ectopic Multiple Live Births   1              # Outcome Date GA Lbr Asif/2nd Weight Sex Delivery Anes PTL Lv   3 SAB            2 Term            1 Term               Obstetric Comments   Normal delivery   Previous miscarriages during first trimester       The following portions of the patient's history were reviewed and updated as appropriate: allergies, current medications, past family history, past medical history, past social history, past surgical history and problem list     Review of Systems    Pertinent items are noted in HPI  Objective    Physical Exam  Constitutional:       Appearance: Normal appearance  She is well-developed  Genitourinary:      Vulva, bladder and urethral meatus normal       No lesions in the vagina  Right Labia: No rash, tenderness, lesions, skin changes or Bartholin's cyst      Left Labia: No tenderness, lesions, skin changes, Bartholin's cyst or rash  No labial fusion noted  No inguinal adenopathy present in the right or left side  Vaginal bleeding (there is moderate blood noted in the vaginal vault ) present  No vaginal discharge, erythema, tenderness or granulation tissue  No vaginal prolapse present  No vaginal atrophy present  Right Adnexa: not tender, not full and no mass present  Left Adnexa: not tender, not full and no mass present  Cervix is parous  No cervical motion tenderness, discharge, friability, lesion, polyp or nabothian cyst       Uterus is not enlarged, tender, irregular or prolapsed  No uterine mass detected  Uterus is anteverted  Pelvic exam was performed with patient in the lithotomy position  Breasts:     Breasts are symmetrical       Right: No inverted nipple, mass, nipple discharge, skin change or tenderness  Left: No inverted nipple, mass, nipple discharge, skin change or tenderness  HENT:      Head: Normocephalic and atraumatic  Neck:      Thyroid: No thyromegaly  Cardiovascular:      Rate and Rhythm: Normal rate and regular rhythm  Heart sounds: Normal heart sounds, S1 normal and S2 normal    Pulmonary:      Effort: Pulmonary effort is normal       Breath sounds: Normal breath sounds  Abdominal:      General: Bowel sounds are normal  There is no distension  Palpations: Abdomen is soft  There is no mass  Tenderness: There is no abdominal tenderness  There is no guarding  Hernia: There is no hernia in the left inguinal area or right inguinal area  Musculoskeletal:      Cervical back: Neck supple  Lymphadenopathy:      Cervical: No cervical adenopathy  Upper Body:      Right upper body: No supraclavicular or axillary adenopathy  Left upper body: No supraclavicular or axillary adenopathy  Lower Body: No right inguinal adenopathy  No left inguinal adenopathy  Neurological:      Mental Status: She is alert  Skin:     General: Skin is warm and dry  Findings: No rash     Psychiatric:         Attention and Perception: Attention and perception normal          Mood and Affect: Mood and affect normal          Speech: Speech normal          Behavior: Behavior is cooperative  Thought Content: Thought content normal          Cognition and Memory: Cognition and memory normal          Judgment: Judgment normal    Vitals and nursing note reviewed  Results from last 6 Months   Lab Units 12/20/22  1423   URINE HCG  negative       Assessment and Plan    Onel Ford was seen today for gynecologic exam     Diagnoses and all orders for this visit:    Women's annual routine gynecological examination  -     Cancel: Liquid-based pap, screening    Irregular bleeding  -     US pelvis complete w transvaginal; Future    Screening examination for STD (sexually transmitted disease)  -     Trichomonas vaginalis/Mycoplasma genitalium PCR  -     Chlamydia/GC amplified DNA by PCR  -     Molecular Vaginal Panel      Patient informed of a Stable GYN exam  A pap smear was not performed today as she is currently bleeding  I explained we will have her come back for a pap smear only  Cultures for STD screening obtained  Pelvic US ordered  I have discussed the importance of exercise and healthy diet as well as adequate intake of calcium and vitamin D  The current ASCCP guidelines were reviewed  The low risk patient will receive pap smear screening every 3 years until the age of 34 and then every 3 to 5 years with HPV co-testing from the ages of 33-67  I emphasized the importance of an annual pelvic and breast exam  A yearly mammogram is recommended for breast cancer screening starting at age 36  Results will be released to [id number], if abnormal will call to review and discuss treatment plan  All questions have been answered to her satisfaction  Follow up for pap smear

## 2022-12-21 ENCOUNTER — TELEPHONE (OUTPATIENT)
Dept: OBGYN CLINIC | Facility: CLINIC | Age: 37
End: 2022-12-21

## 2022-12-21 LAB
C GLABRATA DNA VAG QL NAA+PROBE: NEGATIVE
C KRUSEI DNA VAG QL NAA+PROBE: NEGATIVE
C TRACH DNA SPEC QL NAA+PROBE: NEGATIVE
CANDIDA SP 6 PNL VAG NAA+PROBE: NEGATIVE
M GENITALIUM DNA SPEC QL NAA+PROBE: NEGATIVE
N GONORRHOEA DNA SPEC QL NAA+PROBE: NEGATIVE
T VAGINALIS DNA SPEC QL NAA+PROBE: NEGATIVE
T VAGINALIS DNA VAG QL NAA+PROBE: NEGATIVE
VAGINOSIS/ITIS DNA PNL VAG PROBE+SIG AMP: POSITIVE

## 2022-12-21 NOTE — TELEPHONE ENCOUNTER
----- Message from Gianni Henderson, [address] Juan Lynch sent at 12/21/2022  7:57 AM EST -----  Please call patient with negative STD culture results

## 2022-12-22 ENCOUNTER — TELEPHONE (OUTPATIENT)
Dept: OBGYN CLINIC | Facility: CLINIC | Age: 37
End: 2022-12-22

## 2022-12-22 DIAGNOSIS — N76.0 BV (BACTERIAL VAGINOSIS): Primary | ICD-10-CM

## 2022-12-22 DIAGNOSIS — B96.89 BV (BACTERIAL VAGINOSIS): Primary | ICD-10-CM

## 2022-12-22 RX ORDER — METRONIDAZOLE 500 MG/1
500 TABLET ORAL EVERY 12 HOURS SCHEDULED
Qty: 14 TABLET | Refills: 0 | Status: SHIPPED | OUTPATIENT
Start: 2022-12-22 | End: 2022-12-29

## 2022-12-22 NOTE — TELEPHONE ENCOUNTER
----- Message from Fiona Tirpathi, 10 [address] sent at 12/22/2022 10:28 AM EST -----  Please call patient and inform her that her culture is positive for BV  I have sent flagyl to her pharmacy on file  Explain that this is not an STD but [facility]

## 2022-12-22 NOTE — TELEPHONE ENCOUNTER
----- Message from Lyndon Soulier, 10 [address] sent at 12/22/2022 10:28 AM EST -----  Please call patient and inform her that her culture is positive for BV  I have sent flagyl to her pharmacy on file  Explain that this is not an STD but a [facility]

## 2022-12-27 ENCOUNTER — HOSPITAL ENCOUNTER (OUTPATIENT)
Dept: RADIOLOGY | Facility: HOSPITAL | Age: 37
Discharge: HOME | End: 2022-12-27

## 2022-12-27 DIAGNOSIS — N92.6 IRREGULAR BLEEDING: ICD-10-CM

## 2022-12-29 ENCOUNTER — OFFICE VISIT (OUTPATIENT)
Dept: OBGYN CLINIC | Facility: CLINIC | Age: 37
End: 2022-12-29

## 2022-12-29 VITALS
DIASTOLIC BLOOD PRESSURE: 70 MMHG | WEIGHT: 146 LBS | SYSTOLIC BLOOD PRESSURE: 120 MMHG | BODY MASS INDEX: 25.87 KG/M2 | HEIGHT: 63 IN

## 2022-12-29 DIAGNOSIS — Z01.419 WOMEN'S ANNUAL ROUTINE GYNECOLOGICAL EXAMINATION: Primary | ICD-10-CM

## 2022-12-29 RX ORDER — GABAPENTIN 400 MG/1
CAPSULE ORAL
COMMUNITY
Start: 2022-11-28

## 2022-12-29 RX ORDER — DULOXETIN HYDROCHLORIDE 60 MG/1
CAPSULE, DELAYED RELEASE ORAL
COMMUNITY
Start: 2022-11-28

## 2022-12-29 NOTE — PROGRESS NOTES
Subjective     Diane Solano is a 44 y o  woman who comes in today for a  pap smear only  A pap smear could not be obtained during her annual exam on 12/20/22 due having heavy menses at the time  The following portions of the patient's history were reviewed and updated as appropriate: allergies, current medications, past family history, past medical history, past social history, past surgical history and problem list     Review of Systems  Pertinent items are noted in HPI       Objective     /70   Ht 5' 3" (1 6 m)   Wt 66 2 kg (146 lb)   LMP 12/19/2022   BMI 25 86 kg/m²   Pelvic Exam: external genitalia normal, vagina normal without discharge and cervix normal in appearance  Pap smear obtained  Assessment/Plan     Screening pap smear  Follow up in 1 year, or as indicated by Pap results  Results will be released to [facility], if abnormal will call to review and discuss treatment plan

## 2022-12-30 LAB
HPV HR 12 DNA CVX QL NAA+PROBE: NEGATIVE
HPV16 DNA CVX QL NAA+PROBE: NEGATIVE
HPV18 DNA CVX QL NAA+PROBE: POSITIVE

## 2023-01-04 LAB
LAB AP GYN PRIMARY INTERPRETATION: ABNORMAL
LAB AP LMP: ABNORMAL
Lab: ABNORMAL
PATH INTERP SPEC-IMP: ABNORMAL

## 2023-01-08 ENCOUNTER — HOSPITAL ENCOUNTER (EMERGENCY)
Facility: HOSPITAL | Age: 38
Discharge: HOME | End: 2023-01-08
Attending: EMERGENCY MEDICINE

## 2023-01-08 VITALS
HEART RATE: 92 BPM | SYSTOLIC BLOOD PRESSURE: 153 MMHG | DIASTOLIC BLOOD PRESSURE: 95 MMHG | TEMPERATURE: 97.8 F | OXYGEN SATURATION: 95 % | RESPIRATION RATE: 18 BRPM

## 2023-01-08 DIAGNOSIS — B34.9 VIRAL SYNDROME: Primary | ICD-10-CM

## 2023-01-08 LAB
BACTERIA UR QL AUTO: ABNORMAL /HPF
BILIRUB UR QL STRIP: NEGATIVE
CLARITY UR: ABNORMAL
COLOR UR: YELLOW
GLUCOSE UR STRIP-MCNC: NEGATIVE MG/DL
HGB UR QL STRIP.AUTO: NEGATIVE
KETONES UR STRIP-MCNC: ABNORMAL MG/DL
LEUKOCYTE ESTERASE UR QL STRIP: ABNORMAL
MUCOUS THREADS UR QL AUTO: ABNORMAL
NITRITE UR QL STRIP: NEGATIVE
NON-SQ EPI CELLS URNS QL MICRO: ABNORMAL /HPF
PH UR STRIP.AUTO: 5.5 [PH]
PROT UR STRIP-MCNC: ABNORMAL MG/DL
RBC #/AREA URNS AUTO: ABNORMAL /HPF
SP GR UR STRIP.AUTO: 1.03 (ref 1–1.03)
UROBILINOGEN UR STRIP-ACNC: 2 MG/DL
WBC #/AREA URNS AUTO: ABNORMAL /HPF

## 2023-01-08 RX ORDER — ONDANSETRON 4 MG/1
4 TABLET, ORALLY DISINTEGRATING ORAL ONCE
Status: COMPLETED | OUTPATIENT
Start: 2023-01-08 | End: 2023-01-08

## 2023-01-08 RX ORDER — ACETAMINOPHEN 325 MG/1
650 TABLET ORAL ONCE
Status: COMPLETED | OUTPATIENT
Start: 2023-01-08 | End: 2023-01-08

## 2023-01-08 RX ORDER — IBUPROFEN 600 MG/1
600 TABLET ORAL ONCE
Status: COMPLETED | OUTPATIENT
Start: 2023-01-08 | End: 2023-01-08

## 2023-01-08 RX ADMIN — ONDANSETRON 4 MG: 4 TABLET, ORALLY DISINTEGRATING ORAL at 21:44

## 2023-01-08 RX ADMIN — IBUPROFEN 600 MG: 600 TABLET ORAL at 21:44

## 2023-01-08 RX ADMIN — ACETAMINOPHEN 650 MG: 325 TABLET, FILM COATED ORAL at 21:44

## 2023-01-09 ENCOUNTER — TELEPHONE (OUTPATIENT)
Dept: OBGYN CLINIC | Facility: CLINIC | Age: 38
End: 2023-01-09

## 2023-01-09 LAB
FLUAV RNA RESP QL NAA+PROBE: NEGATIVE
FLUBV RNA RESP QL NAA+PROBE: NEGATIVE
SARS-COV-2 RNA RESP QL NAA+PROBE: POSITIVE

## 2023-01-09 NOTE — TELEPHONE ENCOUNTER
Reviewed pap smear with patient which show ASCUS/JAMARI with positive HPV 18  I explained based on her results she needs further evaluation with a colposcopy  A colposcopy is a procedure in which the cervix is examined with a magnifying device  The cervix is prepped with a vinegar solution and when examined with a colposcope abnormal cells can be seen  Small biopsies are then taken and sent to the lab for testing  All questions and concerns addressed and patient verbalized understanding  Patient transferred to front staff for scheduling

## 2023-01-09 NOTE — ED ATTENDING ATTESTATION
1/8/2023   IStefan MD, saw and evaluated the patient  I have discussed the patient with the resident/non-physician practitioner and agree with the resident's/non-physician practitioner's findings, Plan of Care, and MDM as documented in the resident's/non-physician practitioner's note, except where noted  All available labs and Radiology studies were reviewed  I was present for key portions of any procedure(s) performed by the resident/non-physician practitioner and I was immediately available to provide assistance  At this point I agree with the current assessment done in the Emergency Department  I have conducted an independent evaluation of this patient a history and physical is as follows:    Chief Complaint   Patient presents with   • Vomiting     Son and daughter in law + for covid  Vomiting and diarrhea starting at 3 pm  Headache and body aches  Also c/o urinary incontinence starting today  27-year-old female with past medical history of asthma, IBS, presenting with nausea, diarrhea, headache, body aches, as well as burning with urination  Patient's son and daughter-in-law are currently ill with COVID-19  Patient symptoms started yesterday, today, she had nausea and diarrhea  No fevers  No chest pain or shortness of breath  No cough  Patient reports burning with urination and suprapubic pressure  There is no abdominal pain otherwise  Physical Exam  /95   Pulse 92   Temp 97 8 °F (36 6 °C)   Resp 18   LMP 12/19/2022   SpO2 95%      Vital signs and nursing notes reviewed    CONSTITUTIONAL: female appearing stated age resting in bed, in no acute distress  HEENT: atraumatic, normocephalic  Sclera anicteric, conjunctiva are not injected  Moist oral mucosa  CARDIOVASCULAR/CHEST: RRR, no M/R/G  2+ radial pulses  PULMONARY: Breathing comfortably on RA  Breath sounds are equal and clear to auscultation  ABDOMEN: non-distended  BS present, normoactive    Mild tenderness over suprapubic region  No CVA tenderness  MSK: moves all extremities, no deformities, no peripheral edema, no calf asymmetry  NEURO: Awake, alert, and oriented x 3  Face symmetric  Moves all extremities spontaneously  No focal neurologic deficits  SKIN: Warm, appears well-perfused  MENTAL STATUS: Normal affect      Labs and Imaging  Labs Reviewed   URINALYSIS WITH REFLEX TO SCOPE - Abnormal       Result Value Ref Range Status    Color, UA Yellow   Final    Clarity, UA Turbid   Final    Specific Printer, UA 1 031 (*) 1 003 - 1 030 Final    pH, UA 5 5  4 5, 5 0, 5 5, 6 0, 6 5, 7 0, 7 5, 8 0 Final    Leukocytes, UA Trace (*) Negative Final    Nitrite, UA Negative  Negative Final    Protein, UA 30 (1+) (*) Negative mg/dl Final    Glucose, UA Negative  Negative mg/dl Final    Ketones,  (3+) (*) Negative mg/dl Final    Urobilinogen, UA 2 0 (*) <2 0 mg/dl mg/dl Final    Bilirubin, UA Negative  Negative Final    Occult Blood, UA Negative  Negative Final   URINE MICROSCOPIC - Abnormal    RBC, UA 4-10 (*) None Seen, 1-2 /hpf Final    WBC, UA 2-4 (*) None Seen, 1-2 /hpf Final    Epithelial Cells Moderate (*) None Seen, Occasional /hpf Final    Bacteria, UA Occasional  None Seen, Occasional /hpf Final    MUCUS THREADS Innumerable (*) None Seen Final   COVID19 AND INFLUENZA A/B PCR       No orders to display         Procedures  Procedures        ED Course  Medications   ibuprofen (MOTRIN) tablet 600 mg (600 mg Oral Given 1/8/23 2144)   acetaminophen (TYLENOL) tablet 650 mg (650 mg Oral Given 1/8/23 2144)   ondansetron (ZOFRAN-ODT) dispersible tablet 4 mg (4 mg Oral Given 1/8/23 2144)       21-year-old female presenting with nausea, vomiting, diarrhea, headaches, body aches, as well as burning with urination  Vital signs reviewed, afebrile, hypertensive, within normal notes otherwise    Torrential diagnosis includes viral syndrome including due to COVID-19 (likely given rest of family members ill with the same), influenza, versus another virus, with other etiologies including pneumonia, considered  Concerning burning with urination and suprapubic pressure, differential diagnosis includes urinary tract infection, as another etiology of symptoms  Patient denies chance of being pregnant  Patient treated symptomatically with Zofran, Tylenol, and Motrin  COVID-19 and influenza test sent  UA obtained

## 2023-01-09 NOTE — ED PROVIDER NOTES
History  Chief Complaint   Patient presents with   • Vomiting     Son and daughter in law + for covid  Vomiting and diarrhea starting at 3 pm  Headache and body aches  Also c/o urinary incontinence starting today  HPI  Patient is a 34-year-old female presenting with her grandson due to flulike symptoms  Patient states that she was exposed to her son and daughter-in-law who just tested positive for COVID  Since 2 days ago she has been having vomiting diarrhea headache and generalized myalgia and also complains of burning urination  Due to feeling miserable patient decided to come to the emergency department for possible COVID/flu diagnoses as well as symptomatic treatment  Denies any chest pain, shortness of breath, abdominal pain  Prior to Admission Medications   Prescriptions Last Dose Informant Patient Reported? Taking?    DULoxetine (CYMBALTA) 30 mg delayed release capsule   Yes No   Si mg   DULoxetine (CYMBALTA) 60 mg delayed release capsule   Yes No   Sig: FERN ELIZABETH CAPSULA POR VIA ORAL DIARIAMENTE ANTES DE DORMIR EN LA NOCHE   QUEtiapine (SEROquel) 400 MG tablet   Yes No   Sig: Take 400 mg by mouth daily at bedtime   Riboflavin (VITAMIN B-2) 100 MG TABS   No No   Simg tablets; 4x/day   Patient not taking: Reported on 2022   Spacer/Aero-Holding Chambers (PRO COMFORT SPACER ADULT) MISC   No No   Sig: USE AS DIRECTED   Patient not taking: No sig reported   acetaminophen (TYLENOL) 650 mg CR tablet   No No   Sig: TAKE 1 TABLET (650 MG TOTAL) BY MOUTH EVERY 8 (EIGHT) HOURS AS NEEDED FOR MILD PAIN   albuterol (PROVENTIL HFA,VENTOLIN HFA) 90 mcg/act inhaler   No No   Sig: INHALE 2 PUFFS EVERY 6 (SIX) HOURS AS NEEDED FOR WHEEZING   dicyclomine (BENTYL) 10 mg capsule   No No   Sig: TAKE 2 CAPSULES BY MOUTH 3 (THREE) TIMES A DAY AS NEEDED FOR (NAUSEA, ABDOMINAL PAIN)   fluticasone (FLONASE) 50 mcg/act nasal spray   No No   Si sprays into each nostril continuous as needed for rhinitis gabapentin (NEURONTIN) 400 mg capsule   Yes No   Sig: FERN ELIZABETH CAPSULA POR VIA ORAL ROBEL VECES AL ROGERIO   gabapentin (NEURONTIN) 600 MG tablet   Yes No   Sig: Take 600 mg by mouth 2 (two) times a day   Patient not taking: Reported on 12/29/2022   ibuprofen (MOTRIN) 600 mg tablet   No No   Sig: Take 1 tablet (600 mg total) by mouth every 8 (eight) hours as needed for fever or headaches for up to 10 days   montelukast (SINGULAIR) 10 mg tablet   No No   Sig: TAKE 1 TABLET POR VIA ORAL DIARIAMENTE   propranolol (INDERAL LA) 60 mg 24 hr capsule   No No   Sig: TAKE 1 CAPSULE (60 MG TOTAL) BY MOUTH DAILY      Facility-Administered Medications: None       Past Medical History:   Diagnosis Date   • Asthma    • Chlamydia infection    • Depression    • IBS (irritable bowel syndrome)    • Migraine    • Varicose veins of both lower extremities        Past Surgical History:   Procedure Laterality Date   • COLPOSCOPY         Family History   Problem Relation Age of Onset   • Diabetes Brother    • No Known Problems Mother    • Cirrhosis Father    • No Known Problems Sister    • Autism Son    • No Known Problems Maternal Grandmother    • No Known Problems Maternal Grandfather    • No Known Problems Paternal Grandmother    • No Known Problems Paternal Grandfather    • No Known Problems Brother    • Depression Son    • No Known Problems Cousin    • Colon cancer Maternal Uncle      I have reviewed and agree with the history as documented      E-Cigarette/Vaping   • E-Cigarette Use Never User      E-Cigarette/Vaping Substances   • Nicotine No    • THC No    • CBD No    • Flavoring No    • Other No    • Unknown No      Social History     Tobacco Use   • Smoking status: Every Day     Packs/day: 1 00     Years: 15 00     Pack years: 15 00     Types: Cigarettes   • Smokeless tobacco: Never   Vaping Use   • Vaping Use: Never used   Substance Use Topics   • Alcohol use: Yes     Comment: social   • Drug use: Never        Review of Systems Constitutional: Positive for chills and fever  Negative for diaphoresis and unexpected weight change  HENT: Negative for ear pain and sore throat  Eyes: Negative for visual disturbance  Respiratory: Negative for cough, chest tightness and shortness of breath  Cardiovascular: Negative for chest pain and leg swelling  Gastrointestinal: Positive for diarrhea, nausea and vomiting  Negative for abdominal distention, abdominal pain and constipation  Endocrine: Negative  Genitourinary: Negative for difficulty urinating and dysuria  Musculoskeletal: Positive for myalgias  Skin: Negative  Allergic/Immunologic: Negative  Neurological: Negative  Hematological: Negative  Psychiatric/Behavioral: Negative  All other systems reviewed and are negative  Physical Exam  ED Triage Vitals   Temperature Pulse Respirations Blood Pressure SpO2   01/08/23 2009 01/08/23 2009 01/08/23 2009 01/08/23 2011 01/08/23 2009   97 8 °F (36 6 °C) 92 18 153/95 95 %      Temp src Heart Rate Source Patient Position - Orthostatic VS BP Location FiO2 (%)   -- -- -- -- --             Pain Score       --                    Orthostatic Vital Signs  Vitals:    01/08/23 2009 01/08/23 2011   BP:  153/95   Pulse: 92        Physical Exam  Vitals and nursing note reviewed  Constitutional:       General: She is not in acute distress  Appearance: Normal appearance  She is not ill-appearing  HENT:      Head: Normocephalic and atraumatic  Right Ear: External ear normal       Left Ear: External ear normal       Nose: Nose normal       Mouth/Throat:      Mouth: Mucous membranes are moist       Pharynx: Oropharynx is clear  Eyes:      General: No scleral icterus  Right eye: No discharge  Left eye: No discharge  Extraocular Movements: Extraocular movements intact  Conjunctiva/sclera: Conjunctivae normal       Pupils: Pupils are equal, round, and reactive to light     Cardiovascular:      Rate and Rhythm: Normal rate and regular rhythm  Pulses: Normal pulses  Heart sounds: Normal heart sounds  Pulmonary:      Effort: Pulmonary effort is normal       Breath sounds: Normal breath sounds  Abdominal:      General: Abdomen is flat  Bowel sounds are normal  There is no distension  Palpations: Abdomen is soft  Tenderness: There is no abdominal tenderness  There is no guarding or rebound  Musculoskeletal:         General: Normal range of motion  Cervical back: Normal range of motion and neck supple  Skin:     General: Skin is warm and dry  Capillary Refill: Capillary refill takes less than 2 seconds  Neurological:      General: No focal deficit present  Mental Status: She is alert and oriented to person, place, and time  Mental status is at baseline  Psychiatric:         Mood and Affect: Mood normal          Behavior: Behavior normal          Thought Content: Thought content normal          Judgment: Judgment normal          ED Medications  Medications   ibuprofen (MOTRIN) tablet 600 mg (600 mg Oral Given 1/8/23 2144)   acetaminophen (TYLENOL) tablet 650 mg (650 mg Oral Given 1/8/23 2144)   ondansetron (ZOFRAN-ODT) dispersible tablet 4 mg (4 mg Oral Given 1/8/23 2144)       Diagnostic Studies  Results Reviewed     Procedure Component Value Units Date/Time    FLU/COVID - if FLU clinically relevant [[id number]]  (Abnormal) Collected: 01/08/23 2144    Lab Status: Final result Specimen: Nares from Nasopharyngeal Swab Updated: 01/09/23 1059     SARS-CoV-2 Positive     INFLUENZA A PCR Negative     INFLUENZA B PCR Negative    Narrative:      FOR PEDIATRIC PATIENTS - copy/paste COVID Guidelines URL to browser: [email or web address]    SARS-CoV-2 assay is a Nucleic Acid Amplification assay intended for the  qualitative detection of nucleic acid from SARS-CoV-2 in nasopharyngeal  swabs   Results are for the presumptive identification of SARS-CoV-2 RNA  Positive results are indicative of infection with SARS-CoV-2, the virus  causing COVID-19, but do not rule out bacterial infection or co-infection  with other viruses  Laboratories within the [place] and its  territories are required to report all positive results to the appropriate  public health authorities  Negative results do not preclude SARS-CoV-2  infection and should not be used as the sole basis for treatment or other  patient management decisions  Negative results must be combined with  clinical observations, patient history, and epidemiological information  This test has not been FDA cleared or approved  This test has been authorized by FDA under an Emergency Use Authorization  (EUA)  This test is only authorized for the duration of time the  declaration that circumstances exist justifying the authorization of the  emergency use of an in vitro diagnostic tests for detection of SARS-CoV-2  virus and/or diagnosis of COVID-19 infection under section 564(b)(1) of  the Act, 21 U  S C  896CKS-4(K)(7), unless the authorization is terminated  or revoked sooner  The test has been validated but independent review by FDA  and CLIA is pending  Test performed using the Roche justino 6800 System: This RT-PCR assay  targets ORF1, a region unique to SARS-CoV-2  A conserved region in the  E-gene was chosen for pan-Sarbecovirus detection which includes  SARS-CoV-2  According to CMS-2020-01-R, this platform meets the definition of high-throughput technology        Urine Microscopic [[id number]]  (Abnormal) Collected: 01/08/23 2151    Lab Status: Final result Specimen: Urine, Clean Catch Updated: 01/08/23 2254     RBC, UA 4-10 /hpf      WBC, UA 2-4 /hpf      Epithelial Cells Moderate /hpf      Bacteria, UA Occasional /hpf      MUCUS THREADS Innumerable    UA (URINE) with reflex to Scope [[id number]]  (Abnormal) Collected: 01/08/23 2151    Lab Status: Final result Specimen: Urine, Clean Catch Updated: 01/08/23 2204     Color, UA Yellow     Clarity, UA Turbid     Specific [place], UA 1 031     pH, UA 5 5     Leukocytes, UA Trace     Nitrite, UA Negative     Protein, UA 30 (1+) mg/dl      Glucose, UA Negative mg/dl      Ketones,  (3+) mg/dl      Urobilinogen, UA 2 0 mg/dl      Bilirubin, UA Negative     Occult Blood, UA Negative                 No orders to display         Procedures  Procedures      ED Course                             SBIRT 22yo+    Flowsheet Row Most Recent Value   SBIRT (25 yo +)    In order to provide better care to our patients, we are screening all of our patients for alcohol and drug use  Would it be okay to ask you these screening questions? No Filed at: 01/08/2023 9543                Medical Decision Making  Patient is a 13-year-old female presenting with viral URI symptoms    Viral syndrome: acute illness or injury     Details: Will obtain COVID/flu test will give symptomatic treatment  Amount and/or Complexity of Data Reviewed  Labs: ordered  Discussion of management or test interpretation with external provider(s): Patient feeling better after antipyretic/analgesic and is medically stable for discharge  Risk  OTC drugs  Prescription drug management  Disposition  Final diagnoses:   Viral syndrome     Time reflects when diagnosis was documented in both MDM as applicable and the Disposition within this note     Time User Action Codes Description Comment    1/8/2023 11:04 PM Salomón Soto Add [B34 9] Viral syndrome       ED Disposition     ED Disposition   Discharge    Condition   Stable    Date/Time   Sun Jan 8, 2023 11:04 PM    [address] discharge to home/self care                 Follow-up Information     Follow up With Specialties Details Why Contact Info Additional [address] Emergency Department Emergency Medicine Go to  If symptoms worsen [address] Emergency Department, [address], [place], [place], [address]          Discharge Medication List as of 1/8/2023 11:05 PM      CONTINUE these medications which have NOT CHANGED    Details   acetaminophen (TYLENOL) 650 mg CR tablet TAKE 1 TABLET (650 MG TOTAL) BY MOUTH EVERY 8 (EIGHT) HOURS AS NEEDED FOR MILD PAIN, Starting Mon 12/21/2020, Normal      albuterol (PROVENTIL HFA,VENTOLIN HFA) 90 mcg/act inhaler INHALE 2 PUFFS EVERY 6 (SIX) HOURS AS NEEDED FOR WHEEZING, Starting Tue 7/5/2022, Normal      dicyclomine (BENTYL) 10 mg capsule TAKE 2 CAPSULES BY MOUTH 3 (THREE) TIMES A DAY AS NEEDED FOR (NAUSEA, ABDOMINAL PAIN), Normal      !! DULoxetine (CYMBALTA) 30 mg delayed release capsule 60 mg, Historical Med      !!  DULoxetine (CYMBALTA) 60 mg delayed release capsule FERN ELIZABETH CAPSULA POR VIA ORAL DIARIAMENTE ANTES DE DORMIR EN LA NOCHE, Historical Med      fluticasone (FLONASE) 50 mcg/act nasal spray 2 sprays into each nostril continuous as needed for rhinitis, Starting Wed 8/7/2019, Normal      gabapentin (NEURONTIN) 400 mg capsule FERN ELIZABETH CAPSULA POR VIA ORAL ROBEL VECES AL ROGERIO, Historical Med      gabapentin (NEURONTIN) 600 MG tablet Take 600 mg by mouth 2 (two) times a day, Historical Med      ibuprofen (MOTRIN) 600 mg tablet Take 1 tablet (600 mg total) by mouth every 8 (eight) hours as needed for fever or headaches for up to 10 days, Starting Tue 8/24/2021, Until Thu 12/29/2022 at 2359, No Print      montelukast (SINGULAIR) 10 mg tablet TAKE 1 TABLET POR VIA ORAL DIARIAMENTE, Normal      propranolol (INDERAL LA) 60 mg 24 hr capsule TAKE 1 CAPSULE (60 MG TOTAL) BY MOUTH DAILY, Starting Thu 5/19/2022, Normal      QUEtiapine (SEROquel) 400 MG tablet Take 400 mg by mouth daily at bedtime, Historical Med      Riboflavin (VITAMIN B-2) 100 MG TABS 100mg tablets; 4x/day, Normal      Spacer/Aero-Holding Chambers (PRO COMFORT SPACER ADULT) MISC USE AS DIRECTED, Normal       !! - Potential duplicate medications found  Please discuss with provider  No discharge procedures on file  PDMP Review     None           ED Provider  Attending physically available and evaluated Ana Vieira I managed the patient along with the ED Attending      Electronically Signed by         Carlos Pool MD  01/09/23 6605

## 2023-01-21 DIAGNOSIS — J45.20 MILD INTERMITTENT ASTHMA WITHOUT COMPLICATION: ICD-10-CM

## 2023-01-23 RX ORDER — ALBUTEROL SULFATE 90 UG/1
2 AEROSOL, METERED RESPIRATORY (INHALATION) EVERY 6 HOURS PRN
Qty: 36 G | Refills: 3 | Status: SHIPPED | OUTPATIENT
Start: 2023-01-23

## 2023-01-30 ENCOUNTER — PROCEDURE VISIT (OUTPATIENT)
Dept: OBGYN CLINIC | Facility: CLINIC | Age: 38
End: 2023-01-30

## 2023-01-30 VITALS
WEIGHT: 148 LBS | HEIGHT: 63 IN | BODY MASS INDEX: 26.22 KG/M2 | DIASTOLIC BLOOD PRESSURE: 60 MMHG | SYSTOLIC BLOOD PRESSURE: 98 MMHG

## 2023-01-30 DIAGNOSIS — R87.618 ABNORMAL PAPANICOLAOU SMEAR OF CERVIX WITH POSITIVE HUMAN PAPILLOMA VIRUS (HPV) TEST: Primary | ICD-10-CM

## 2023-01-30 NOTE — PATIENT INSTRUCTIONS
The patient tolerated the procedure well  I informed her that I believe this is the same as her prior lesion she had 5 years ago  That is DEYANIRA-1 or mild dysplasia  We will arrange for virtual visit in 3 weeks

## 2023-01-30 NOTE — PROGRESS NOTES
Colposcopy     Date/Time 2023 2:26 PM     Universal Protocol   Consent: Verbal consent obtained  Consent given by: patient  Timeout called at: 2023 1:45 PM   Patient understanding: patient states understanding of the procedure being performed  Patient consent: the patient's understanding of the procedure matches consent given  Procedure consent: procedure consent matches procedure scheduled  Relevant documents: relevant documents present and verified  Test results: test results available and properly labeled  Site marked: the operative site was not marked  Radiology Images displayed and confirmed  If images not available, report reviewed: imaging studies not available  Required items: required blood products, implants, devices, and special equipment available       Performed by  Adarsh Aleman MD     Authorized by Adarsh Aleman MD        Pre-procedure details     Prepped with: acetic acid       Indication    Indications: ASCUS positive HPV 18  Procedure Details   Procedure: Colposcopy w/ cervical biopsy and ECC      Under satisfactory analgesia the patient was prepped and draped in the dorsal lithotomy position: yes      [place] speculum was placed in the vagina: yes      Under colposcopic examination the transition zone was seen in entirety: yes      Intracervical block was performed: no      Endocervix was curetted using a Kevorkian curette: yes      Cervical biopsy performed with a cervical biopsy punch: yes      Tampon inserted: no      Monsel's solution was applied: yes      Biopsy(s): yes      Location:  6:00, 11:00, 12:00, and ECC    Specimen to pathology: yes       Post-procedure      Impression: Low grade cervical dysplasia       Comments       This is a 69-year-old female she is a  3 para 2 with 2 prior vaginal deliveries  She was seen by my nurse practitioner most recent Pap smear was positive for ASCUS with positive HPV 18    Review of her chart showed that she had a colposcopy back in 2018 biopsies were consistent with DEYANIRA-1  The colposcopy was performed without incident or complication there were areas of white epithelium suspicious for DEYANIRA-1  Biopsies were taken  The patient tolerated seizure well  Bleeding was well controlled with Monsel solution  She will arrange for virtual visit in 3 weeks  She was call my impression probably DEYANIRA-1 1 which is the same as she had 5 years ago  She will watch out for fever chills or burning when she passes her urine  If she has any other problems she will contact me  We will arrange for virtual visit in 3 weeks

## 2023-02-21 ENCOUNTER — TELEMEDICINE (OUTPATIENT)
Dept: OBGYN CLINIC | Facility: CLINIC | Age: 38
End: 2023-02-21

## 2023-02-21 DIAGNOSIS — N87.0 DYSPLASIA OF CERVIX, LOW GRADE (CIN 1): Primary | ICD-10-CM

## 2023-02-21 NOTE — PATIENT INSTRUCTIONS
The patient was informed that the results of her biopsy were consistent with DEYANIRA-1/mild dysplasia  It was explained to the patient that this is a mild condition we will not treat it  Results explained to the patient is important that you there will follow-up  We want to do a repeat Pap smear every 6 months for the next 2 years  She agrees to the plan  She remains make an appointment to see me in July of this year

## 2023-02-21 NOTE — PROGRESS NOTES
Virtual Regular Visit    Verification of patient location:    Patient is located in the following state in which I hold an active license [place]      Assessment/Plan:    Problem List Items Addressed This Visit    None           Reason for visit is   Chief Complaint   Patient presents with   • Virtual Regular Visit        Encounter provider Greta Dillard MD    [facility] [address] [id number] [phone]      Recent Visits  No visits were found meeting these conditions  Showing recent visits within past 7 days and meeting all other requirements  Future Appointments  No visits were found meeting these conditions  Showing future appointments within next 150 days and meeting all other requirements       The patient was identified by name and date of birth  Tanner Leggett was informed that this is a telemedicine visit and that the visit is being conducted through the [address] [employer] platform  She agrees to proceed     My office door was closed  No one else was in the room  She acknowledged consent and understanding of privacy and security of the video platform  The patient has agreed to participate and understands they can discontinue the visit at any time  Patient is aware this is a billable service  Subjective  Tanner Leggett is a 44 y o  female whose most recent Pap smear was positive for HPV 18  She underwent a colposcopy  Today a virtual visit has been arranged to discuss the results of the biopsy  HPI     This is a 28-year-old female she is a  3 para 2  She had an abnormal Pap smear in the end of December which was positive HPV 18  She underwent a colposcopy in the month of January  Her virtual visit was set up today to discuss the results of the procedure  The patient states she tolerated procedure well with no complications  She also states that she does not understand the results of the biopsy    The biopsy showed DEYANIRA-1, mild dysplasia  This was explained to the patient as a mild condition  Explained to the patient we do not treat this condition  I did also explain to the patient that it is important that she get there will follow-up  We will repeat a Pap smear every 6 months for the next 2 years beginning in July of this year  She understands the importance of surveillance  She will arrange for an appointment in my office for July  If she has any other questions or concerns she will call me  She states she had no problems after the procedure          Past Medical History:   Diagnosis Date   • Asthma    • Chlamydia infection    • Depression    • IBS (irritable bowel syndrome)    • Migraine    • Varicose veins of both lower extremities        Past Surgical History:   Procedure Laterality Date   • COLPOSCOPY         Current Outpatient Medications   Medication Sig Dispense Refill   • acetaminophen (TYLENOL) 650 mg CR tablet TAKE 1 TABLET (650 MG TOTAL) BY MOUTH EVERY 8 (EIGHT) HOURS AS NEEDED FOR MILD PAIN 30 tablet 0   • albuterol (PROVENTIL HFA,VENTOLIN HFA) 90 mcg/act inhaler INHALE 2 PUFFS EVERY 6 (SIX) HOURS AS NEEDED FOR WHEEZING 36 g 3   • dicyclomine (BENTYL) 10 mg capsule TAKE 2 CAPSULES BY MOUTH 3 (THREE) TIMES A DAY AS NEEDED FOR (NAUSEA, ABDOMINAL PAIN) 90 capsule 0   • DULoxetine (CYMBALTA) 30 mg delayed release capsule 60 mg  0   • DULoxetine (CYMBALTA) 60 mg delayed release capsule FERN ELIZABETH CAPSULA POR VIA ORAL DIARIAMENTE ANTES DE DORMIR EN LA NOCHE     • fluticasone (FLONASE) 50 mcg/act nasal spray 2 sprays into each nostril continuous as needed for rhinitis 2 Bottle 0   • gabapentin (NEURONTIN) 400 mg capsule FERN ELIZABETH CAPSULA POR VIA ORAL ROBEL VECES AL ROGERIO     • gabapentin (NEURONTIN) 600 MG tablet Take 600 mg by mouth 2 (two) times a day (Patient not taking: Reported on 12/29/2022)     • ibuprofen (MOTRIN) 600 mg tablet Take 1 tablet (600 mg total) by mouth every 8 (eight) hours as needed for fever or headaches for up to 10 days 30 tablet 0   • montelukast (SINGULAIR) 10 mg tablet TAKE 1 TABLET POR VIA ORAL DIARIAMENTE 180 tablet 0   • propranolol (INDERAL LA) 60 mg 24 hr capsule TAKE 1 CAPSULE (60 MG TOTAL) BY MOUTH DAILY 90 capsule 3   • QUEtiapine (SEROquel) 400 MG tablet Take 400 mg by mouth daily at bedtime     • Riboflavin (VITAMIN B-2) 100 MG TABS 100mg tablets; 4x/day (Patient not taking: Reported on 12/29/2022) 360 each 0   • Spacer/Aero-Holding Chambers (PRO COMFORT SPACER ADULT) MISC USE AS DIRECTED (Patient not taking: No sig reported) 1 each 0     No current facility-administered medications for this visit  No Known Allergies    Review of Systems    All negative no complaints    Video Exam    There were no vitals filed for this visit  Physical Exam     Patient is alert and oriented without any acute distress        I spent 5 minutes directly with the patient during this visit

## 2023-05-03 DIAGNOSIS — J45.20 MILD INTERMITTENT ASTHMA WITHOUT COMPLICATION: ICD-10-CM

## 2023-05-03 RX ORDER — ALBUTEROL SULFATE 90 UG/1
2 AEROSOL, METERED RESPIRATORY (INHALATION) EVERY 6 HOURS PRN
Qty: 18 G | Refills: 3 | Status: SHIPPED | OUTPATIENT
Start: 2023-05-03

## 2023-07-05 ENCOUNTER — HOSPITAL ENCOUNTER (EMERGENCY)
Facility: HOSPITAL | Age: 38
Discharge: HOME | End: 2023-07-06
Attending: EMERGENCY MEDICINE
Payer: COMMERCIAL

## 2023-07-05 VITALS
SYSTOLIC BLOOD PRESSURE: 132 MMHG | RESPIRATION RATE: 17 BRPM | HEART RATE: 86 BPM | DIASTOLIC BLOOD PRESSURE: 75 MMHG | OXYGEN SATURATION: 95 % | TEMPERATURE: 98.1 F

## 2023-07-05 DIAGNOSIS — M54.2 NECK PAIN: Primary | ICD-10-CM

## 2023-07-05 PROCEDURE — 93005 ELECTROCARDIOGRAM TRACING: CPT

## 2023-07-05 RX ORDER — DIAZEPAM 5 MG/1
5 TABLET ORAL ONCE
Status: COMPLETED | OUTPATIENT
Start: 2023-07-06 | End: 2023-07-05

## 2023-07-05 RX ORDER — ACETAMINOPHEN 325 MG/1
650 TABLET ORAL ONCE
Status: COMPLETED | OUTPATIENT
Start: 2023-07-05 | End: 2023-07-05

## 2023-07-05 RX ORDER — KETOROLAC TROMETHAMINE 30 MG/ML
15 INJECTION, SOLUTION INTRAMUSCULAR; INTRAVENOUS ONCE
Status: COMPLETED | OUTPATIENT
Start: 2023-07-05 | End: 2023-07-05

## 2023-07-05 RX ADMIN — KETOROLAC TROMETHAMINE 15 MG: 30 INJECTION, SOLUTION INTRAMUSCULAR; INTRAVENOUS at 23:19

## 2023-07-05 RX ADMIN — DIAZEPAM 5 MG: 5 TABLET ORAL at 23:59

## 2023-07-05 RX ADMIN — ACETAMINOPHEN 650 MG: 325 TABLET, FILM COATED ORAL at 23:19

## 2023-07-05 NOTE — Clinical Note
Doc Boy was seen and treated in our emergency department on 7/5/2023. Diagnosis:     Janay Pike  . She may return on this date: 07/08/2023         If you have any questions or concerns, please don't hesitate to call.       Marshall Headings, MD    ______________________________           _______________          _______________  Hospital Representative                              Date                                Time

## 2023-07-06 LAB
ATRIAL RATE: 92 BPM
P AXIS: 52 DEGREES
PR INTERVAL: 132 MS
QRS AXIS: 63 DEGREES
QRSD INTERVAL: 74 MS
QT INTERVAL: 360 MS
QTC INTERVAL: 445 MS
T WAVE AXIS: 26 DEGREES
VENTRICULAR RATE: 92 BPM

## 2023-07-06 PROCEDURE — 93010 ELECTROCARDIOGRAM REPORT: CPT | Performed by: INTERNAL MEDICINE

## 2023-07-06 RX ORDER — CYCLOBENZAPRINE HCL 10 MG
10 TABLET ORAL 2 TIMES DAILY PRN
Qty: 20 TABLET | Refills: 0 | Status: SHIPPED | OUTPATIENT
Start: 2023-07-06

## 2023-07-06 NOTE — ED ATTENDING ATTESTATION
7/5/2023  IEliseo MD, saw and evaluated the patient. I have discussed the patient with the resident/non-physician practitioner and agree with the resident's/non-physician practitioner's findings, Plan of Care, and MDM as documented in the resident's/non-physician practitioner's note, except where noted. All available labs and Radiology studies were reviewed. I was present for key portions of any procedure(s) performed by the resident/non-physician practitioner and I was immediately available to provide assistance. At this point I agree with the current assessment done in the Emergency Department. I have conducted an independent evaluation of this patient a history and physical is as follows:    ED Course  ED Course as of 07/06/23 0220   Thu Jul 06, 2023   0021 Symptoms improved following medications, she will be discharged home with muscle relaxer. 79-year-old female with a history of migraines presents emergency department for evaluation of left-sided head, neck, and arm pain. Symptoms have been going on for approximately 2 weeks. She denies any trauma, falls, or heavy lifting. Pain is associated with some nausea and nonspecific dizziness. She states she has had similar presentations in the past.  No chest pain or shortness of breath. No rashes. No blurry vision or double vision. Has not tried any medications. On exam, patient resting comfortably in bed in no acute distress. Vital signs are stable. She has tenderness to palpation in the left side of her neck, left trapezius region, left posterior scalp with associated muscle spasm. No overlying skin changes, no crepitus, or pain out of proportion. No focal neurologic deficits. Sensation grossly intact. 79-year-old female with left-sided head and neck pain: Differential diagnosis includes but is not limited to migraine, muscle spasm, strain, anxiety. Will treat symptomatically and reassess.   Do not believe any further work-up is necessary at this time as she is well-appearing. Symptoms improved following medications. She will be discharged home.   Critical Care Time  Procedures

## 2023-07-06 NOTE — DISCHARGE INSTRUCTIONS
Please take Flexeril as prescribed. You may also take Tylenol Motrin. Please return to the emergency department develop any new or concerning symptoms including high fevers, difficulty moving your neck, or chest pain.

## 2023-07-06 NOTE — ED PROVIDER NOTES
History  Chief Complaint   Patient presents with   • Neck Pain     Left sided neck pain x 2 weeks, increased tonight. Denies cp sob     Patient is a 58-year-old female with history of migraines, anxiety, IBS, and depression who presents with pain. Patient states that for the past 2 weeks her entire left side has been hurting. She states that she does not know what provoked it but that it has been there constantly. It occasionally gets better but this seems to be spontaneous. She denies any exacerbating or relieving factors. She endorses some occasional nausea and dizziness. She states that she has a history of migraine headaches but that this does not feel the same. She has tried taking ibuprofen with no relief. Patient was seen in this emergency department approximately 1 year ago with similar symptoms, at that time was treated with migraine cocktail, Lidoderm patch, and Haldol. Patient denies any fevers, chills, chest pain, shortness of breath, abdominal pain, vomiting, dysuria, or diarrhea. Denies any recent stressors or life changes. Prior to Admission Medications   Prescriptions Last Dose Informant Patient Reported? Taking?    DULoxetine (CYMBALTA) 30 mg delayed release capsule   Yes No   Si mg   DULoxetine (CYMBALTA) 60 mg delayed release capsule   Yes No   Sig: FERN ELIZABETH CAPSULA POR VIA ORAL DIARIAMENTE ANTES DE DORMIR EN LA NOCHE   QUEtiapine (SEROquel) 400 MG tablet   Yes No   Sig: Take 400 mg by mouth daily at bedtime   Riboflavin (VITAMIN B-2) 100 MG TABS   No No   Simg tablets; 4x/day   Patient not taking: Reported on 2022   Spacer/Aero-Holding Chambers (PRO COMFORT SPACER ADULT) MISC   No No   Sig: USE AS DIRECTED   Patient not taking: No sig reported   acetaminophen (TYLENOL) 650 mg CR tablet   No No   Sig: TAKE 1 TABLET (650 MG TOTAL) BY MOUTH EVERY 8 (EIGHT) HOURS AS NEEDED FOR MILD PAIN   albuterol (PROVENTIL HFA,VENTOLIN HFA) 90 mcg/act inhaler   No No   Sig: INHALE 2 PUFFS EVERY 6 (SIX) HOURS AS NEEDED FOR WHEEZING   dicyclomine (BENTYL) 10 mg capsule   No No   Sig: TAKE 2 CAPSULES BY MOUTH 3 (THREE) TIMES A DAY AS NEEDED FOR (NAUSEA, ABDOMINAL PAIN)   fluticasone (FLONASE) 50 mcg/act nasal spray   No No   Si sprays into each nostril continuous as needed for rhinitis   gabapentin (NEURONTIN) 400 mg capsule   Yes No   Sig: FERN ELIZABETH CAPSULA POR VIA ORAL ROBEL VECES AL ROGERIO   gabapentin (NEURONTIN) 600 MG tablet   Yes No   Sig: Take 600 mg by mouth 2 (two) times a day   Patient not taking: Reported on 2022   ibuprofen (MOTRIN) 600 mg tablet   No No   Sig: Take 1 tablet (600 mg total) by mouth every 8 (eight) hours as needed for fever or headaches for up to 10 days   montelukast (SINGULAIR) 10 mg tablet   No No   Sig: TAKE 1 TABLET POR VIA ORAL DIARIAMENTE   propranolol (INDERAL LA) 60 mg 24 hr capsule   No No   Sig: TAKE 1 CAPSULE (60 MG TOTAL) BY MOUTH DAILY      Facility-Administered Medications: None       Past Medical History:   Diagnosis Date   • Asthma    • Chlamydia infection    • Depression    • IBS (irritable bowel syndrome)    • Migraine    • Varicose veins of both lower extremities        Past Surgical History:   Procedure Laterality Date   • COLPOSCOPY         Family History   Problem Relation Age of Onset   • Diabetes Brother    • No Known Problems Mother    • Cirrhosis Father    • No Known Problems Sister    • Autism Son    • No Known Problems Maternal Grandmother    • No Known Problems Maternal Grandfather    • No Known Problems Paternal Grandmother    • No Known Problems Paternal Grandfather    • No Known Problems Brother    • Depression Son    • No Known Problems Cousin    • Colon cancer Maternal Uncle      I have reviewed and agree with the history as documented.     E-Cigarette/Vaping   • E-Cigarette Use Never User      E-Cigarette/Vaping Substances   • Nicotine No    • THC No    • CBD No    • Flavoring No    • Other No    • Unknown No Social History     Tobacco Use   • Smoking status: Every Day     Packs/day: 1.00     Years: 15.00     Total pack years: 15.00     Types: Cigarettes   • Smokeless tobacco: Never   Vaping Use   • Vaping Use: Never used   Substance Use Topics   • Alcohol use: Yes     Comment: social   • Drug use: Never        Review of Systems   Constitutional: Negative for chills and fever. HENT: Negative for congestion, rhinorrhea and sore throat. Eyes: Negative for pain and visual disturbance. Respiratory: Negative for cough and shortness of breath. Cardiovascular: Negative for chest pain and palpitations. Gastrointestinal: Positive for nausea. Negative for abdominal pain, constipation, diarrhea and vomiting. Genitourinary: Negative for dysuria and hematuria. Musculoskeletal: Negative for back pain. Skin: Negative for color change and rash. Neurological: Positive for dizziness. Negative for weakness and numbness. All other systems reviewed and are negative. Physical Exam  ED Triage Vitals   Temperature Pulse Respirations Blood Pressure SpO2   07/05/23 2228 07/05/23 2228 07/05/23 2228 07/05/23 2228 07/05/23 2228   98.1 °F (36.7 °C) 86 17 132/75 95 %      Temp src Heart Rate Source Patient Position - Orthostatic VS BP Location FiO2 (%)   -- -- -- -- --             Pain Score       07/05/23 2319       6             Orthostatic Vital Signs  Vitals:    07/05/23 2228   BP: 132/75   Pulse: 86       Physical Exam  Vitals and nursing note reviewed. Constitutional:       General: She is not in acute distress. Appearance: Normal appearance. She is well-developed and normal weight. She is not ill-appearing, toxic-appearing or diaphoretic. HENT:      Head: Normocephalic and atraumatic. Right Ear: External ear normal.      Left Ear: External ear normal.      Nose: Nose normal. No congestion or rhinorrhea. Mouth/Throat:      Mouth: Mucous membranes are moist.      Pharynx: Oropharynx is clear.  No oropharyngeal exudate or posterior oropharyngeal erythema. Eyes:      General: No scleral icterus. Right eye: No discharge. Left eye: No discharge. Extraocular Movements: Extraocular movements intact. Conjunctiva/sclera: Conjunctivae normal.      Pupils: Pupils are equal, round, and reactive to light. Cardiovascular:      Rate and Rhythm: Normal rate and regular rhythm. Pulses: Normal pulses. Heart sounds: Normal heart sounds. No murmur heard. No friction rub. No gallop. Pulmonary:      Effort: Pulmonary effort is normal. No respiratory distress. Breath sounds: Normal breath sounds. No stridor. No wheezing, rhonchi or rales. Abdominal:      General: Abdomen is flat. Bowel sounds are normal. There is no distension. Palpations: Abdomen is soft. Tenderness: There is no abdominal tenderness. There is no guarding. Musculoskeletal:         General: No tenderness. Cervical back: Neck supple. Right lower leg: No edema. Left lower leg: No edema. Skin:     General: Skin is warm and dry. Capillary Refill: Capillary refill takes less than 2 seconds. Coloration: Skin is not jaundiced or pale. Neurological:      General: No focal deficit present. Mental Status: She is alert and oriented to person, place, and time. Cranial Nerves: No cranial nerve deficit. Sensory: No sensory deficit. Motor: No weakness.    Psychiatric:         Mood and Affect: Mood normal.         Behavior: Behavior normal.         ED Medications  Medications   ketorolac (TORADOL) injection 15 mg (15 mg Intramuscular Given 7/5/23 2319)   acetaminophen (TYLENOL) tablet 650 mg (650 mg Oral Given 7/5/23 2319)   diazepam (VALIUM) tablet 5 mg (5 mg Oral Given 7/5/23 0429)       Diagnostic Studies  Results Reviewed     None                 No orders to display         Procedures  Procedures      ED Course                                       Medical Decision Making  Patient is a 79-year-old female with history of anxiety, depression, migraines, and IBS who presents with left-sided pain. DDx includes not limited to anxiety and musculoskeletal pain. At this time I do not believe there is indication for further testing and that patient be treated symptomatically given that she is well-appearing and hemodynamically stable. Will give Toradol and Tylenol, reassess. Patient endorses minimal relief symptoms, will attempt Valium. Patient states that she feels much better with the Valium, patient states that she feels comfortable going home. Will prescribe patient Flexeril for home. I advised patient to follow-up with her primary care doctor in 1 to 2 weeks to be reassessed. Return precautions given, all questions answered. Risk  OTC drugs. Prescription drug management. Disposition  Final diagnoses:   Neck pain     Time reflects when diagnosis was documented in both MDM as applicable and the Disposition within this note     Time User Action Codes Description Comment    7/6/2023 12:16 AM Chayito Boyle Add [M54.2] Neck pain       ED Disposition     ED Disposition   Discharge    Condition   Stable    Date/Time   Thu Jul 6, 2023 12:16 AM    Comment   Minda Hanson discharge to home/self care.                Follow-up Information     Follow up With Specialties Details Why Contact Info Additional [address] [facility], DO Pulmonary Disease, Critical Care Medicine Call  As needed [address] [facility] [phone]       [address] [place] [address] Emergency Department Emergency Medicine Go to  If symptoms worsen or if you have any other specific concerns [address] [id number]  [facility] Emergency Department, [address], [facility]          Discharge Medication List as of 7/6/2023 12:18 AM      START taking these medications Details   cyclobenzaprine (FLEXERIL) 10 mg tablet Take 1 tablet (10 mg total) by mouth 2 (two) times a day as needed for muscle spasms, Starting Thu 7/6/2023, Normal         CONTINUE these medications which have NOT CHANGED    Details   acetaminophen (TYLENOL) 650 mg CR tablet TAKE 1 TABLET (650 MG TOTAL) BY MOUTH EVERY 8 (EIGHT) HOURS AS NEEDED FOR MILD PAIN, Starting Mon 12/21/2020, Normal      albuterol (PROVENTIL HFA,VENTOLIN HFA) 90 mcg/act inhaler INHALE 2 PUFFS EVERY 6 (SIX) HOURS AS NEEDED FOR WHEEZING, Starting Wed 5/3/2023, Normal      dicyclomine (BENTYL) 10 mg capsule TAKE 2 CAPSULES BY MOUTH 3 (THREE) TIMES A DAY AS NEEDED FOR (NAUSEA, ABDOMINAL PAIN), Normal      !! DULoxetine (CYMBALTA) 30 mg delayed release capsule 60 mg, Historical Med      !!  DULoxetine (CYMBALTA) 60 mg delayed release capsule FERN ELIZABETH CAPSULA POR VIA ORAL DIARIAMENTE ANTES DE DORMIR EN LA NOCHE, Historical Med      fluticasone (FLONASE) 50 mcg/act nasal spray 2 sprays into each nostril continuous as needed for rhinitis, Starting Wed 8/7/2019, Normal      gabapentin (NEURONTIN) 400 mg capsule FERN ELIZABETH CAPSULA POR VIA ORAL ROBEL VECES AL ROGERIO, Historical Med      gabapentin (NEURONTIN) 600 MG tablet Take 600 mg by mouth 2 (two) times a day, Historical Med      ibuprofen (MOTRIN) 600 mg tablet Take 1 tablet (600 mg total) by mouth every 8 (eight) hours as needed for fever or headaches for up to 10 days, Starting Tue 8/24/2021, Until Thu 12/29/2022 at 2359, No Print      montelukast (SINGULAIR) 10 mg tablet TAKE 1 TABLET POR VIA ORAL DIARIAMENTE, Normal      propranolol (INDERAL LA) 60 mg 24 hr capsule TAKE 1 CAPSULE (60 MG TOTAL) BY MOUTH DAILY, Starting Thu 5/19/2022, Normal      QUEtiapine (SEROquel) 400 MG tablet Take 400 mg by mouth daily at bedtime, Historical Med      Riboflavin (VITAMIN B-2) 100 MG TABS 100mg tablets; 4x/day, Normal      Spacer/Aero-Holding Chambers (PRO COMFORT SPACER ADULT) MISC USE AS DIRECTED, Normal !! - Potential duplicate medications found. Please discuss with provider. No discharge procedures on file. PDMP Review     None           ED Provider  Attending physically available and evaluated Kathy Clayton. I managed the patient along with the ED Attending.     Electronically Signed by         Aimee Kearney MD  07/06/23 7157

## 2023-08-12 DIAGNOSIS — J45.20 MILD INTERMITTENT ASTHMA WITHOUT COMPLICATION: ICD-10-CM

## 2023-08-14 RX ORDER — ALBUTEROL SULFATE 90 UG/1
2 AEROSOL, METERED RESPIRATORY (INHALATION) EVERY 6 HOURS PRN
Qty: 18 G | Refills: 3 | OUTPATIENT
Start: 2023-08-14

## 2023-10-02 DIAGNOSIS — J45.20 MILD INTERMITTENT ASTHMA WITHOUT COMPLICATION: ICD-10-CM

## 2023-10-02 RX ORDER — ALBUTEROL SULFATE 90 UG/1
2 AEROSOL, METERED RESPIRATORY (INHALATION) EVERY 6 HOURS PRN
Qty: 18 G | Refills: 3 | OUTPATIENT
Start: 2023-10-02

## 2023-10-02 RX ORDER — MONTELUKAST SODIUM 10 MG/1
TABLET ORAL
Qty: 180 TABLET | Refills: 0 | OUTPATIENT
Start: 2023-10-02

## 2023-11-01 ENCOUNTER — APPOINTMENT (EMERGENCY)
Dept: RADIOLOGY | Facility: HOSPITAL | Age: 38
End: 2023-11-01
Payer: COMMERCIAL

## 2023-11-01 ENCOUNTER — HOSPITAL ENCOUNTER (EMERGENCY)
Facility: HOSPITAL | Age: 38
Discharge: HOME | End: 2023-11-01
Attending: EMERGENCY MEDICINE
Payer: COMMERCIAL

## 2023-11-01 VITALS
DIASTOLIC BLOOD PRESSURE: 75 MMHG | TEMPERATURE: 97.4 F | OXYGEN SATURATION: 98 % | SYSTOLIC BLOOD PRESSURE: 123 MMHG | HEART RATE: 70 BPM | RESPIRATION RATE: 28 BRPM

## 2023-11-01 DIAGNOSIS — R19.7 DIARRHEA: ICD-10-CM

## 2023-11-01 DIAGNOSIS — R11.10 VOMITING: ICD-10-CM

## 2023-11-01 DIAGNOSIS — R10.9 ABDOMINAL PAIN: Primary | ICD-10-CM

## 2023-11-01 LAB
ALBUMIN SERPL BCP-MCNC: 4.7 G/DL (ref 3.5–5)
ALP SERPL-CCNC: 81 U/L (ref 34–104)
ALT SERPL W P-5'-P-CCNC: 20 U/L (ref 7–52)
ANION GAP SERPL CALCULATED.3IONS-SCNC: 7 MMOL/L
AST SERPL W P-5'-P-CCNC: 15 U/L (ref 13–39)
ATRIAL RATE: 74 BPM
ATRIAL RATE: 75 BPM
BACTERIA UR QL AUTO: ABNORMAL /HPF
BASOPHILS # BLD AUTO: 0.05 THOUSANDS/ÂΜL (ref 0–0.1)
BASOPHILS NFR BLD AUTO: 1 % (ref 0–1)
BILIRUB SERPL-MCNC: 0.29 MG/DL (ref 0.2–1)
BILIRUB UR QL STRIP: NEGATIVE
BUN SERPL-MCNC: 13 MG/DL (ref 5–25)
CALCIUM SERPL-MCNC: 10.6 MG/DL (ref 8.4–10.2)
CHLORIDE SERPL-SCNC: 104 MMOL/L (ref 96–108)
CLARITY UR: ABNORMAL
CO2 SERPL-SCNC: 25 MMOL/L (ref 21–32)
COLOR UR: YELLOW
CREAT SERPL-MCNC: 0.72 MG/DL (ref 0.6–1.3)
EOSINOPHIL # BLD AUTO: 0.07 THOUSAND/ÂΜL (ref 0–0.61)
EOSINOPHIL NFR BLD AUTO: 1 % (ref 0–6)
ERYTHROCYTE [DISTWIDTH] IN BLOOD BY AUTOMATED COUNT: 15 % (ref 11.6–15.1)
EXT PREGNANCY TEST URINE: NEGATIVE
EXT. CONTROL: NORMAL
GFR SERPL CREATININE-BSD FRML MDRD: 105 ML/MIN/1.73SQ M
GLUCOSE SERPL-MCNC: 105 MG/DL (ref 65–140)
GLUCOSE UR STRIP-MCNC: NEGATIVE MG/DL
HCT VFR BLD AUTO: 49.4 % (ref 34.8–46.1)
HGB BLD-MCNC: 15.9 G/DL (ref 11.5–15.4)
HGB UR QL STRIP.AUTO: ABNORMAL
IMM GRANULOCYTES # BLD AUTO: 0.05 THOUSAND/UL (ref 0–0.2)
IMM GRANULOCYTES NFR BLD AUTO: 1 % (ref 0–2)
KETONES UR STRIP-MCNC: NEGATIVE MG/DL
LEUKOCYTE ESTERASE UR QL STRIP: NEGATIVE
LIPASE SERPL-CCNC: 24 U/L (ref 11–82)
LYMPHOCYTES # BLD AUTO: 2.35 THOUSANDS/ÂΜL (ref 0.6–4.47)
LYMPHOCYTES NFR BLD AUTO: 23 % (ref 14–44)
MCH RBC QN AUTO: 29.4 PG (ref 26.8–34.3)
MCHC RBC AUTO-ENTMCNC: 32.2 G/DL (ref 31.4–37.4)
MCV RBC AUTO: 92 FL (ref 82–98)
MONOCYTES # BLD AUTO: 0.61 THOUSAND/ÂΜL (ref 0.17–1.22)
MONOCYTES NFR BLD AUTO: 6 % (ref 4–12)
MUCOUS THREADS UR QL AUTO: ABNORMAL
NEUTROPHILS # BLD AUTO: 7.2 THOUSANDS/ÂΜL (ref 1.85–7.62)
NEUTS SEG NFR BLD AUTO: 68 % (ref 43–75)
NITRITE UR QL STRIP: NEGATIVE
NON-SQ EPI CELLS URNS QL MICRO: ABNORMAL /HPF
NRBC BLD AUTO-RTO: 0 /100 WBCS
P AXIS: 248 DEGREES
P AXIS: 251 DEGREES
PH UR STRIP.AUTO: 5.5 [PH] (ref 4.5–8)
PLATELET # BLD AUTO: 410 THOUSANDS/UL (ref 149–390)
PMV BLD AUTO: 9.6 FL (ref 8.9–12.7)
POTASSIUM SERPL-SCNC: 3.5 MMOL/L (ref 3.5–5.3)
PR INTERVAL: 116 MS
PR INTERVAL: 120 MS
PROT SERPL-MCNC: 7.9 G/DL (ref 6.4–8.4)
PROT UR STRIP-MCNC: NEGATIVE MG/DL
QRS AXIS: 76 DEGREES
QRS AXIS: 78 DEGREES
QRSD INTERVAL: 70 MS
QRSD INTERVAL: 76 MS
QT INTERVAL: 360 MS
QT INTERVAL: 364 MS
QTC INTERVAL: 402 MS
QTC INTERVAL: 404 MS
RBC # BLD AUTO: 5.4 MILLION/UL (ref 3.81–5.12)
RBC #/AREA URNS AUTO: ABNORMAL /HPF
SODIUM SERPL-SCNC: 136 MMOL/L (ref 135–147)
SP GR UR STRIP.AUTO: >=1.03 (ref 1–1.03)
T WAVE AXIS: 14 DEGREES
T WAVE AXIS: 17 DEGREES
URATE CRY URNS QL MICRO: ABNORMAL /HPF
UROBILINOGEN UR QL STRIP.AUTO: 0.2 E.U./DL
VENTRICULAR RATE: 74 BPM
VENTRICULAR RATE: 75 BPM
WBC # BLD AUTO: 10.33 THOUSAND/UL (ref 4.31–10.16)
WBC #/AREA URNS AUTO: ABNORMAL /HPF

## 2023-11-01 PROCEDURE — 74177 CT ABD & PELVIS W/CONTRAST: CPT

## 2023-11-01 PROCEDURE — 96375 TX/PRO/DX INJ NEW DRUG ADDON: CPT

## 2023-11-01 PROCEDURE — 99284 EMERGENCY DEPT VISIT MOD MDM: CPT

## 2023-11-01 PROCEDURE — G1004 CDSM NDSC: HCPCS

## 2023-11-01 PROCEDURE — 36415 COLL VENOUS BLD VENIPUNCTURE: CPT

## 2023-11-01 PROCEDURE — 96361 HYDRATE IV INFUSION ADD-ON: CPT

## 2023-11-01 PROCEDURE — 96374 THER/PROPH/DIAG INJ IV PUSH: CPT

## 2023-11-01 PROCEDURE — 93010 ELECTROCARDIOGRAM REPORT: CPT | Performed by: INTERNAL MEDICINE

## 2023-11-01 PROCEDURE — 93005 ELECTROCARDIOGRAM TRACING: CPT

## 2023-11-01 PROCEDURE — 81001 URINALYSIS AUTO W/SCOPE: CPT

## 2023-11-01 PROCEDURE — 83690 ASSAY OF LIPASE: CPT

## 2023-11-01 PROCEDURE — 85025 COMPLETE CBC W/AUTO DIFF WBC: CPT

## 2023-11-01 PROCEDURE — 80053 COMPREHEN METABOLIC PANEL: CPT

## 2023-11-01 PROCEDURE — 99285 EMERGENCY DEPT VISIT HI MDM: CPT | Performed by: EMERGENCY MEDICINE

## 2023-11-01 PROCEDURE — 81025 URINE PREGNANCY TEST: CPT

## 2023-11-01 RX ORDER — MAGNESIUM HYDROXIDE/ALUMINUM HYDROXICE/SIMETHICONE 120; 1200; 1200 MG/30ML; MG/30ML; MG/30ML
30 SUSPENSION ORAL ONCE
Status: COMPLETED | OUTPATIENT
Start: 2023-11-01 | End: 2023-11-01

## 2023-11-01 RX ORDER — ONDANSETRON 4 MG/1
4 TABLET, FILM COATED ORAL EVERY 6 HOURS
Qty: 12 TABLET | Refills: 0 | Status: SHIPPED | OUTPATIENT
Start: 2023-11-01

## 2023-11-01 RX ORDER — ONDANSETRON 2 MG/ML
4 INJECTION INTRAMUSCULAR; INTRAVENOUS ONCE
Status: COMPLETED | OUTPATIENT
Start: 2023-11-01 | End: 2023-11-01

## 2023-11-01 RX ORDER — KETOROLAC TROMETHAMINE 30 MG/ML
15 INJECTION, SOLUTION INTRAMUSCULAR; INTRAVENOUS ONCE
Status: COMPLETED | OUTPATIENT
Start: 2023-11-01 | End: 2023-11-01

## 2023-11-01 RX ADMIN — SODIUM CHLORIDE 1000 ML: 0.9 INJECTION, SOLUTION INTRAVENOUS at 18:02

## 2023-11-01 RX ADMIN — IOHEXOL 100 ML: 350 INJECTION, SOLUTION INTRAVENOUS at 19:46

## 2023-11-01 RX ADMIN — ONDANSETRON 4 MG: 2 INJECTION INTRAMUSCULAR; INTRAVENOUS at 18:02

## 2023-11-01 RX ADMIN — ALUMINUM HYDROXIDE, MAGNESIUM HYDROXIDE, AND SIMETHICONE 30 ML: 200; 200; 20 SUSPENSION ORAL at 20:44

## 2023-11-01 RX ADMIN — KETOROLAC TROMETHAMINE 15 MG: 30 INJECTION, SOLUTION INTRAMUSCULAR; INTRAVENOUS at 18:02

## 2023-11-01 RX ADMIN — MORPHINE SULFATE 2 MG: 2 INJECTION, SOLUTION INTRAMUSCULAR; INTRAVENOUS at 20:44

## 2023-11-01 NOTE — ED PROVIDER NOTES
History  Chief Complaint   Patient presents with    Dizziness     N/V/D, chills and dizziness. Reports blood in urine and lower abdominal pain     44 y/o female patient presents to ED c/o n/v onset 2 days ago and lower abd pain radiating to back and heamturia past 2 weeks. States lightheadednes and had multiple epsiodes of emesis yesterday. Denies any sick contacts. Patient also has diarrhea since yesterday, nonbloody. States constant lower abdomen radiates to flanks bilaterally. Deneis vaginal bleeding, discharge. LMP oct 23rd. States took ibuprofen without improvement. Denies fevers, chest pain, SOB, dysuria but admits to mild urinary incontinence. Prior to Admission Medications   Prescriptions Last Dose Informant Patient Reported? Taking?    DULoxetine (CYMBALTA) 30 mg delayed release capsule   Yes No   Si mg   DULoxetine (CYMBALTA) 60 mg delayed release capsule   Yes No   Sig: FERN ELIZABETH CAPSULA POR VIA ORAL DIARIAMENTE ANTES DE DORMIR EN LA NOCHE   QUEtiapine (SEROquel) 400 MG tablet   Yes No   Sig: Take 400 mg by mouth daily at bedtime   Riboflavin (VITAMIN B-2) 100 MG TABS   No No   Simg tablets; 4x/day   Patient not taking: Reported on 2022   Spacer/Aero-Holding Chambers (PRO COMFORT SPACER ADULT) MISC   No No   Sig: USE AS DIRECTED   Patient not taking: No sig reported   acetaminophen (TYLENOL) 650 mg CR tablet   No No   Sig: TAKE 1 TABLET (650 MG TOTAL) BY MOUTH EVERY 8 (EIGHT) HOURS AS NEEDED FOR MILD PAIN   albuterol (PROVENTIL HFA,VENTOLIN HFA) 90 mcg/act inhaler   No No   Sig: INHALE 2 PUFFS EVERY 6 (SIX) HOURS AS NEEDED FOR WHEEZING   cyclobenzaprine (FLEXERIL) 10 mg tablet   No No   Sig: Take 1 tablet (10 mg total) by mouth 2 (two) times a day as needed for muscle spasms   dicyclomine (BENTYL) 10 mg capsule   No No   Sig: TAKE 2 CAPSULES BY MOUTH 3 (THREE) TIMES A DAY AS NEEDED FOR (NAUSEA, ABDOMINAL PAIN)   fluticasone (FLONASE) 50 mcg/act nasal spray   No No   Si sprays into each nostril continuous as needed for rhinitis   gabapentin (NEURONTIN) 400 mg capsule   Yes No   Sig: FERN ELIZABETH CAPSULA POR VIA ORAL ROBEL VECES AL ROGERIO   gabapentin (NEURONTIN) 600 MG tablet   Yes No   Sig: Take 600 mg by mouth 2 (two) times a day   Patient not taking: Reported on 12/29/2022   ibuprofen (MOTRIN) 600 mg tablet   No No   Sig: Take 1 tablet (600 mg total) by mouth every 8 (eight) hours as needed for fever or headaches for up to 10 days   montelukast (SINGULAIR) 10 mg tablet   No No   Sig: TAKE 1 TABLET POR VIA ORAL DIARIAMENTE   propranolol (INDERAL LA) 60 mg 24 hr capsule   No No   Sig: TAKE 1 CAPSULE (60 MG TOTAL) BY MOUTH DAILY      Facility-Administered Medications: None       Past Medical History:   Diagnosis Date    Asthma     Chlamydia infection     Depression     IBS (irritable bowel syndrome)     Migraine     Varicose veins of both lower extremities        Past Surgical History:   Procedure Laterality Date    COLPOSCOPY         Family History   Problem Relation Age of Onset    Diabetes Brother     No Known Problems Mother     Cirrhosis Father     No Known Problems Sister     Autism Son     No Known Problems Maternal Grandmother     No Known Problems Maternal Grandfather     No Known Problems Paternal Grandmother     No Known Problems Paternal Grandfather     No Known Problems Brother     Depression Son     No Known Problems Cousin     Colon cancer Maternal Uncle      I have reviewed and agree with the history as documented.     E-Cigarette/Vaping    E-Cigarette Use Never User      E-Cigarette/Vaping Substances    Nicotine No     THC No     CBD No     Flavoring No     Other No     Unknown No      Social History     Tobacco Use    Smoking status: Every Day     Packs/day: 1.00     Years: 15.00     Total pack years: 15.00     Types: Cigarettes    Smokeless tobacco: Never   Vaping Use    Vaping Use: Never used   Substance Use Topics    Alcohol use: Yes     Comment: social    Drug use: Never Review of Systems   Gastrointestinal:  Positive for abdominal pain, diarrhea, nausea and vomiting. Genitourinary:  Positive for hematuria. All other systems reviewed and are negative. Physical Exam  ED Triage Vitals [11/01/23 1659]   Temperature Pulse Respirations Blood Pressure SpO2   (!) 97.4 °F (36.3 °C) 99 19 135/95 99 %      Temp Source Heart Rate Source Patient Position - Orthostatic VS BP Location FiO2 (%)   Oral Monitor Sitting Right arm --      Pain Score       8             Orthostatic Vital Signs  Vitals:    11/01/23 1659 11/01/23 1845 11/01/23 1930   BP: 135/95 124/77 123/75   Pulse: 99 78 70   Patient Position - Orthostatic VS: Sitting         Physical Exam  Vitals reviewed. Constitutional:       Appearance: Normal appearance. HENT:      Head: Normocephalic and atraumatic. Nose: Nose normal.      Mouth/Throat:      Mouth: Mucous membranes are moist.      Pharynx: Oropharynx is clear. Eyes:      Extraocular Movements: Extraocular movements intact. Conjunctiva/sclera: Conjunctivae normal.   Cardiovascular:      Rate and Rhythm: Normal rate and regular rhythm. Pulses: Normal pulses. Heart sounds: Normal heart sounds. Pulmonary:      Effort: Pulmonary effort is normal.      Breath sounds: Normal breath sounds. Abdominal:      General: Bowel sounds are normal.      Palpations: Abdomen is soft. Tenderness: There is abdominal tenderness (epigastric tenderness, bilateral pelvic tenderness). Musculoskeletal:         General: Normal range of motion. Cervical back: Normal range of motion. Skin:     General: Skin is warm and dry. Neurological:      General: No focal deficit present. Mental Status: She is alert and oriented to person, place, and time. Mental status is at baseline.          ED Medications  Medications   ketorolac (TORADOL) injection 15 mg (15 mg Intravenous Given 11/1/23 1802)   sodium chloride 0.9 % bolus 1,000 mL (0 mL Intravenous Stopped 11/1/23 1951)   ondansetron (ZOFRAN) injection 4 mg (4 mg Intravenous Given 11/1/23 1802)   iohexol (OMNIPAQUE) 350 MG/ML injection (SINGLE-DOSE) 100 mL (100 mL Intravenous Given 11/1/23 1946)   morphine injection 2 mg (2 mg Intravenous Given 11/1/23 2044)   aluminum-magnesium hydroxide-simethicone (MAALOX) oral suspension 30 mL (30 mL Oral Given 11/1/23 2044)       Diagnostic Studies  Results Reviewed       Procedure Component Value Units Date/Time    Urine Microscopic [[id number]]  (Abnormal) Collected: 11/01/23 1834    Lab Status: Final result Specimen: Urine, Clean Catch Updated: 11/01/23 1905     RBC, UA 1-2 /hpf      WBC, UA 2-4 /hpf      Epithelial Cells Occasional /hpf      Bacteria, UA None Seen /hpf      MUCUS THREADS Occasional     Uric Acid Ludy, UA Moderate /hpf     POCT pregnancy, urine [[id number]]  (Normal) Resulted: 11/01/23 1858    Lab Status: Final result Updated: 11/01/23 1858     EXT Preg Test, Ur Negative     Control Valid    Urine Macroscopic, POC [[id number]]  (Abnormal) Collected: 11/01/23 1834    Lab Status: Final result Specimen: Urine Updated: 11/01/23 1835     Color, UA Yellow     Clarity, UA Slightly Cloudy     pH, UA 5.5     Leukocytes, UA Negative     Nitrite, UA Negative     Protein, UA Negative mg/dl      Glucose, UA Negative mg/dl      Ketones, UA Negative mg/dl      Urobilinogen, UA 0.2 E.U./dl      Bilirubin, UA Negative     Occult Blood, UA Small     Specific Gravity, UA >=1.030    Narrative:      CLINITEK RESULT    Lipase [[id number]]  (Normal) Collected: 11/01/23 1758    Lab Status: Final result Specimen: Blood from Arm, Left Updated: 11/01/23 1831     Lipase 24 u/L     Comprehensive metabolic panel [[id number]]  (Abnormal) Collected: 11/01/23 1758    Lab Status: Final result Specimen: Blood from Arm, Left Updated: 11/01/23 1831     Sodium 136 mmol/L      Potassium 3.5 mmol/L      Chloride 104 mmol/L      CO2 25 mmol/L      ANION GAP 7 mmol/L      BUN 13 mg/dL Creatinine 0.72 mg/dL      Glucose 105 mg/dL      Calcium 10.6 mg/dL      AST 15 U/L      ALT 20 U/L      Alkaline Phosphatase 81 U/L      Total Protein 7.9 g/dL      Albumin 4.7 g/dL      Total Bilirubin 0.29 mg/dL      eGFR 105 ml/min/1.73sq m     Narrative:      [facility] guidelines for Chronic Kidney Disease (CKD):     Stage 1 with normal or high GFR (GFR > 90 mL/min/1.73 square meters)    Stage 2 Mild CKD (GFR = 60-89 mL/min/1.73 square meters)    Stage 3A Moderate CKD (GFR = 45-59 mL/min/1.73 square meters)    Stage 3B Moderate CKD (GFR = 30-44 mL/min/1.73 square meters)    Stage 4 Severe CKD (GFR = 15-29 mL/min/1.73 square meters)    Stage 5 End Stage CKD (GFR <15 mL/min/1.73 square meters)  Note: GFR calculation is accurate only with a steady state creatinine    CBC and differential [[id number]]  (Abnormal) Collected: 11/01/23 1758    Lab Status: Final result Specimen: Blood from Arm, Left Updated: 11/01/23 1810     WBC 10.33 Thousand/uL      RBC 5.40 Million/uL      Hemoglobin 15.9 g/dL      Hematocrit 49.4 %      MCV 92 fL      MCH 29.4 pg      MCHC 32.2 g/dL      RDW 15.0 %      MPV 9.6 fL      Platelets 925 Thousands/uL      nRBC 0 /100 WBCs      Neutrophils Relative 68 %      Immat GRANS % 1 %      Lymphocytes Relative 23 %      Monocytes Relative 6 %      Eosinophils Relative 1 %      Basophils Relative 1 %      Neutrophils Absolute 7.20 Thousands/µL      Immature Grans Absolute 0.05 Thousand/uL      Lymphocytes Absolute 2.35 Thousands/µL      Monocytes Absolute 0.61 Thousand/µL      Eosinophils Absolute 0.07 Thousand/µL      Basophils Absolute 0.05 Thousands/µL                    CT abdomen pelvis with contrast   Final Result by Darius Murphy MD (11/01 2020)      1. No acute findings in the abdomen/pelvis.          Workstation performed: [id number]               Procedures  Procedures      ED Course                                       Medical Decision Making  26-year-old female patient presenting with lightheadedness, nausea vomiting diarrhea, chills, belly pain, hematuria. Patient states for 2 days. On exam, patient has epigastric tenderness, bilateral pelvic tenderness. DDx includes UTI, gastroenteritis, diverticulitis, pancreatitis. Labs within normal limits other than mildly elevated WBC. CT abdomen pelvis negative. Patient treated with Toradol, morphine and GI cocktail with improvement of symptoms. Stable for discharge with follow-up with PCP. Return precautions given. Discussed results with patient. Amount and/or Complexity of Data Reviewed  Labs: ordered. Radiology: ordered. Discussion of management or test interpretation with external provider(s): Follow-up with PCP    Risk  OTC drugs. Prescription drug management. Disposition  Final diagnoses:   Abdominal pain   Vomiting   Diarrhea     Time reflects when diagnosis was documented in both MDM as applicable and the Disposition within this note       Time User Action Codes Description Comment    11/1/2023  8:34 PM Jaime Duty Add [R10.9] Abdominal pain     11/1/2023  8:34 PM Jaime Duty Add [R11.10] Vomiting     11/1/2023  8:35 PM Jaime Duty Add [R19.7] Diarrhea           ED Disposition       ED Disposition   Discharge    Condition   Stable    Date/Time   Wed Nov 1, 2023  8:34 PM    Comment   [facility] discharge to home/self care.                    Follow-up Information       Follow up With Specialties Details Why [facility], DO Pulmonary Disease, Critical Care Medicine Schedule an appointment as soon as possible for a visit   [address]  [phone]              Discharge Medication List as of 11/1/2023  8:37 PM        START taking these medications    Details   ondansetron (ZOFRAN) 4 mg tablet Take 1 tablet (4 mg total) by mouth every 6 (six) hours, Starting Wed 11/1/2023, Normal           CONTINUE these medications which have NOT CHANGED Details   acetaminophen (TYLENOL) 650 mg CR tablet TAKE 1 TABLET (650 MG TOTAL) BY MOUTH EVERY 8 (EIGHT) HOURS AS NEEDED FOR MILD PAIN, Starting Mon 12/21/2020, Normal      albuterol (PROVENTIL HFA,VENTOLIN HFA) 90 mcg/act inhaler INHALE 2 PUFFS EVERY 6 (SIX) HOURS AS NEEDED FOR WHEEZING, Starting Wed 5/3/2023, Normal      cyclobenzaprine (FLEXERIL) 10 mg tablet Take 1 tablet (10 mg total) by mouth 2 (two) times a day as needed for muscle spasms, Starting Thu 7/6/2023, Normal      dicyclomine (BENTYL) 10 mg capsule TAKE 2 CAPSULES BY MOUTH 3 (THREE) TIMES A DAY AS NEEDED FOR (NAUSEA, ABDOMINAL PAIN), Normal      !! DULoxetine (CYMBALTA) 30 mg delayed release capsule 60 mg, Historical Med      !! DULoxetine (CYMBALTA) 60 mg delayed release capsule FERN ELIZABETH CAPSULA POR VIA ORAL DIARIAMENTE ANTES DE DORMIR EN LA NOCHE, Historical Med      fluticasone (FLONASE) 50 mcg/act nasal spray 2 sprays into each nostril continuous as needed for rhinitis, Starting Wed 8/7/2019, Normal      gabapentin (NEURONTIN) 400 mg capsule FERN ELIZABETH CAPSULA POR VIA ORAL ROBEL VECES AL ROGERIO, Historical Med      gabapentin (NEURONTIN) 600 MG tablet Take 600 mg by mouth 2 (two) times a day, Historical Med      ibuprofen (MOTRIN) 600 mg tablet Take 1 tablet (600 mg total) by mouth every 8 (eight) hours as needed for fever or headaches for up to 10 days, Starting Tue 8/24/2021, Until Thu 12/29/2022 at 2359, No Print      montelukast (SINGULAIR) 10 mg tablet TAKE 1 TABLET POR VIA ORAL DIARIAMENTE, Normal      propranolol (INDERAL LA) 60 mg 24 hr capsule TAKE 1 CAPSULE (60 MG TOTAL) BY MOUTH DAILY, Starting Thu 5/19/2022, Normal      QUEtiapine (SEROquel) 400 MG tablet Take 400 mg by mouth daily at bedtime, Historical Med      Riboflavin (VITAMIN B-2) 100 MG TABS 100mg tablets; 4x/day, Normal      Spacer/Aero-Holding Chambers (PRO COMFORT SPACER ADULT) MISC USE AS DIRECTED, Normal       !! - Potential duplicate medications found.  Please discuss with provider. No discharge procedures on file. PDMP Review       None             ED Provider  Attending physically available and evaluated Brian Rasheed. I managed the patient along with the ED Attending.     Electronically Signed by           Mikey Self MD  11/01/23 4921

## 2023-11-01 NOTE — ED ATTENDING ATTESTATION
Final Diagnoses:   No diagnosis found. Tiffanie Keys MD, saw and evaluated the patient. All available labs and X-rays were ordered by me or the resident / non-physician and have been reviewed by myself. I discussed the patient with the resident / non-physician and agree with the resident's / non-physician practitioner's findings and plan as documented in the resident's / non-physician practicitioner's note, except where noted. At this point, I agree with the current assessment done in the ED. I was present during key portions of all procedures performed unless otherwise stated. HPI: This is a 43 y.o. female presenting for evaluation of nausea/vomiting x2 days, hematuria x2 weeks, with lower belly pain. N/V: NBNB, occurred yesterday mostly, but still nausea today. LH with subjective fever at home as well. The belly pain is lower belly, bilaterally x2 days associated with nausea/vomiting, goes to flanks. Sharp. Constant. Uses motrin (for headaches) and it didn't really help. +diarrhea: In terms of diarrhea, patient denies sick contacts with similar, recent antibiotics within the last 90 days, and history of C.diff. No recent travel outside the state or country. No recent hiking / camping. No blood in stool that the patient noted. Hematuria: blood with wiping at the urethra, not in the bowel. Denies any urinary tract infection symptoms (burning, itching, pain, frequency). NURSING TRIAGE:   Chief Complaint   Patient presents with    Dizziness     N/V/D, chills and dizziness. Reports blood in urine and lower abdominal pain      PHYSICAL: ASSESSMENT + PLAN:   General: VS reviewed  Appears in NAD  awake, alert. Well-nourished, well-developed. Appears stated age. Speaking normally in full sentences. Head: Normocephalic, atraumatic  Eyes: EOM-I. No diplopia. No hyphema. No subconjunctival hemorrhages. Symmetrical lids. ENT: Atraumatic external nose and ears.     MMM  No malocclusion. No stridor. Normal phonation. No drooling. Normal swallowing. Neck: No JVD. CV: No pallor noted  Lungs:   No tachypnea  No respiratory distress  Abd: soft   Epigastric tenderness maximally, minimally RLQ/LLQ  Soft abdomen  +CVAT bilaterally  MSK:   FROM spontaneously  Skin: Dry, intact. Neuro: Awake, alert, GCS15, CN II-XII grossly intact. Motor grossly intact. Psychiatric/Behavioral: interacting normally; appropriate mood/affect.  Exam: deferred    Vitals:    11/01/23 1659   BP: 135/95   BP Location: Right arm   Pulse: 99   Resp: 19   Temp: (!) 97.4 °F (36.3 °C)   TempSrc: Oral   SpO2: 99%    - given the presentation, will check CBC for marked leukocytosis  - CMP for liver enzyme elevation that could signal cholecystitis, biliary obstructive disease. Check RFTs for ERICK / markers of dehydration.  - Lipase given abdominal pain to evaluate specifically for pancreatitis. - Urine: will check for UTI or signs of pyelonephritis. - Pregnancy test: given she is female, could if positive, could be ectopic and would change workup to ultrasound instead of CT scan. - Lastly, will consider abdominal imaging. ***- CT AP w Contrast: r/o appendicitis, cholecystitis, bowel obstruction or other acute abdominal pathology. Would also demonstrate signs of pyelonephritis, cystitis. - Disposition per workup. There are ***no obvious limitations to social determinants of care. Nursing note reviewed. Vitals reviewed. Orders placed by myself and/or advanced practitioner / resident. Previous chart was reviewed  ***No language barrier. History obtained from ***patient. There are ***no limitations to the history obtained:  Critical Care Time:   - Critical care time: ***  - Critical care time was exclusive of seperately bilable procedures and treating other patients as well as teaching time.    - Critical care was necessary to treat or prevent imminent or life-threatening deterioration of the following condition: ***  - Critical care time was spent personally by me on the following activities as well as the above as per the ED course and rest of chart: {Blank multiple:16881::"blood draw for specimens","obtaining history from patient / surrogate","developement of a treatment plan","discussions with consultants","evaluation of patient's response to the treatment","examination of the patient, ordering/performing treatements and interventions","re-evaluation of the patient's condition","review of old charts","ordering/reviewing laboratory studies","ordering/reviewing of radiographic studies"}   Past Medical: Past Surgical:    has a past medical history of Asthma, Chlamydia infection, Depression, IBS (irritable bowel syndrome), Migraine, and Varicose veins of both lower extremities. has a past surgical history that includes Colposcopy. Social: Cardiac (Echo/Cath)   Social History     Substance and Sexual Activity   Alcohol Use Yes    Comment: social     Social History     Tobacco Use   Smoking Status Every Day    Packs/day: 1.00    Years: 15.00    Total pack years: 15.00    Types: Cigarettes   Smokeless Tobacco Never     Social History     Substance and Sexual Activity   Drug Use Never    No results found for this or any previous visit. No results found for this or any previous visit. No results found for this or any previous visit. Labs: Imaging:   Labs Reviewed - No data to display No orders to display      Medications: Code Status:   Medications - No data to display Code Status: No Order  Advance Directive and Living Will:      Power of :    POLST:       Orders Placed This Encounter   Procedures    ECG 12 lead    ECG 12 lead     ED Disposition       None          Follow-up Information    None       Patient's Medications   Discharge Prescriptions    No medications on file     No discharge procedures on file.   Prior to Admission Medications   Prescriptions Last Dose Informant Patient Reported? Taking?    DULoxetine (CYMBALTA) 30 mg delayed release capsule   Yes No   Si mg   DULoxetine (CYMBALTA) 60 mg delayed release capsule   Yes No   Sig: FERN ELIZABETH CAPSULA POR VIA ORAL DIARIAMENTE ANTES DE DORMIR EN LA NOCHE   QUEtiapine (SEROquel) 400 MG tablet   Yes No   Sig: Take 400 mg by mouth daily at bedtime   Riboflavin (VITAMIN B-2) 100 MG TABS   No No   Simg tablets; 4x/day   Patient not taking: Reported on 2022   Spacer/Aero-Holding Chambers (PRO COMFORT SPACER ADULT) MISC   No No   Sig: USE AS DIRECTED   Patient not taking: No sig reported   acetaminophen (TYLENOL) 650 mg CR tablet   No No   Sig: TAKE 1 TABLET (650 MG TOTAL) BY MOUTH EVERY 8 (EIGHT) HOURS AS NEEDED FOR MILD PAIN   albuterol (PROVENTIL HFA,VENTOLIN HFA) 90 mcg/act inhaler   No No   Sig: INHALE 2 PUFFS EVERY 6 (SIX) HOURS AS NEEDED FOR WHEEZING   cyclobenzaprine (FLEXERIL) 10 mg tablet   No No   Sig: Take 1 tablet (10 mg total) by mouth 2 (two) times a day as needed for muscle spasms   dicyclomine (BENTYL) 10 mg capsule   No No   Sig: TAKE 2 CAPSULES BY MOUTH 3 (THREE) TIMES A DAY AS NEEDED FOR (NAUSEA, ABDOMINAL PAIN)   fluticasone (FLONASE) 50 mcg/act nasal spray   No No   Si sprays into each nostril continuous as needed for rhinitis   gabapentin (NEURONTIN) 400 mg capsule   Yes No   Sig: FERN ELIZABETH CAPSULA POR VIA ORAL ROBEL VECES AL ROGERIO   gabapentin (NEURONTIN) 600 MG tablet   Yes No   Sig: Take 600 mg by mouth 2 (two) times a day   Patient not taking: Reported on 2022   ibuprofen (MOTRIN) 600 mg tablet   No No   Sig: Take 1 tablet (600 mg total) by mouth every 8 (eight) hours as needed for fever or headaches for up to 10 days   montelukast (SINGULAIR) 10 mg tablet   No No   Sig: TAKE 1 TABLET POR VIA ORAL DIARIAMENTE   propranolol (INDERAL LA) 60 mg 24 hr capsule   No No   Sig: TAKE 1 CAPSULE (60 MG TOTAL) BY MOUTH DAILY      Facility-Administered Medications: None                        Portions of the record may have been created with voice recognition software. Occasional wrong word or "sound a like" substitutions may have occurred due to the inherent limitations of voice recognition software. Read the chart carefully and recognize, using context, where substitutions have occurred.     Electronically signed by:  Shira Sanchez and the Disposition within this note       Time User Action Codes Description Comment    11/1/2023  8:34 PM Monica BLANCHARD [facility] Add [R10.9] Abdominal pain     11/1/2023  8:34 PM Monica BLANCHARD [facility] Add [R11.10] Vomiting     11/1/2023  8:35 PM Clemente Federico Add [R19.7] Diarrhea           ED Disposition       ED Disposition   Discharge    Condition   Stable    Date/Time   Wed Nov 1, 2023  8:34 PM    Comment   Zion Lundberg discharge to home/self care. Follow-up Information       Follow up With Specialties Details Why [facility], DO Pulmonary Disease, Critical Care Medicine Schedule an appointment as soon as possible for a visit   [address] [place] [address]  [phone]            Discharge Medication List as of 11/1/2023  8:37 PM        START taking these medications    Details   ondansetron (ZOFRAN) 4 mg tablet Take 1 tablet (4 mg total) by mouth every 6 (six) hours, Starting Wed 11/1/2023, Normal           CONTINUE these medications which have NOT CHANGED    Details   acetaminophen (TYLENOL) 650 mg CR tablet TAKE 1 TABLET (650 MG TOTAL) BY MOUTH EVERY 8 (EIGHT) HOURS AS NEEDED FOR MILD PAIN, Starting Mon 12/21/2020, Normal      albuterol (PROVENTIL HFA,VENTOLIN HFA) 90 mcg/act inhaler INHALE 2 PUFFS EVERY 6 (SIX) HOURS AS NEEDED FOR WHEEZING, Starting Wed 5/3/2023, Normal      cyclobenzaprine (FLEXERIL) 10 mg tablet Take 1 tablet (10 mg total) by mouth 2 (two) times a day as needed for muscle spasms, Starting u 7/6/2023, Normal      dicyclomine (BENTYL) 10 mg capsule TAKE 2 CAPSULES BY MOUTH 3 (THREE) TIMES A DAY AS NEEDED FOR (NAUSEA, ABDOMINAL PAIN), Normal      !! DULoxetine (CYMBALTA) 30 mg delayed release capsule 60 mg, Historical Med      !!  DULoxetine (CYMBALTA) 60 mg delayed release capsule FERN ELIZABETH CAPSULA POR VIA ORAL DIARIAMENTE ANTES DE DORMIR EN LA NOCHE, Historical Med      fluticasone (FLONASE) 50 mcg/act nasal spray 2 sprays into each nostril continuous as needed for rhinitis, Starting 2019, Normal      gabapentin (NEURONTIN) 400 mg capsule FERN ELIZABETH CAPSULA POR VIA ORAL ROBEL VECES AL ROGERIO, Historical Med      ibuprofen (MOTRIN) 600 mg tablet Take 1 tablet (600 mg total) by mouth every 8 (eight) hours as needed for fever or headaches for up to 10 days, Starting 2021, Until 2022 at 2359, No Print      montelukast (SINGULAIR) 10 mg tablet TAKE 1 TABLET POR VIA ORAL DIARIAMENTE, Normal      propranolol (INDERAL LA) 60 mg 24 hr capsule TAKE 1 CAPSULE (60 MG TOTAL) BY MOUTH DAILY, Starting 2022, Normal      QUEtiapine (SEROquel) 400 MG tablet Take 400 mg by mouth daily at bedtime, Historical Med      gabapentin (NEURONTIN) 600 MG tablet Take 600 mg by mouth 2 (two) times a day, Historical Med      Riboflavin (VITAMIN B-2) 100 MG TABS 100mg tablets; 4x/day, Normal      Spacer/Aero-Holding Chambers (PRO COMFORT SPACER ADULT) MISC USE AS DIRECTED, Normal       !! - Potential duplicate medications found. Please discuss with provider. No discharge procedures on file. Prior to Admission Medications   Prescriptions Last Dose Informant Patient Reported? Taking?    DULoxetine (CYMBALTA) 30 mg delayed release capsule   Yes No   Si mg   DULoxetine (CYMBALTA) 60 mg delayed release capsule   Yes No   Sig: FERN ELIZABETH CAPSULA POR VIA ORAL DIARIAMENTE ANTES DE DORMIR EN LA NOCHE   QUEtiapine (SEROquel) 400 MG tablet   Yes No   Sig: Take 400 mg by mouth daily at bedtime   Spacer/Aero-Holding Chambers (PRO COMFORT SPACER ADULT) MISC   No No   Sig: USE AS DIRECTED   Patient not taking: No sig reported   acetaminophen (TYLENOL) 650 mg CR tablet   No No   Sig: TAKE 1 TABLET (650 MG TOTAL) BY MOUTH EVERY 8 (EIGHT) HOURS AS NEEDED FOR MILD PAIN   albuterol (PROVENTIL HFA,VENTOLIN HFA) 90 mcg/act inhaler   No No   Sig: INHALE 2 PUFFS EVERY 6 (SIX) HOURS AS NEEDED FOR WHEEZING   cyclobenzaprine (FLEXERIL) 10 mg tablet   No No   Sig: Take 1 tablet (10 mg total) by mouth 2 (two) times a day as needed for muscle spasms   dicyclomine (BENTYL) 10 mg capsule   No No   Sig: TAKE 2 CAPSULES BY MOUTH 3 (THREE) TIMES A DAY AS NEEDED FOR (NAUSEA, ABDOMINAL PAIN)   fluticasone (FLONASE) 50 mcg/act nasal spray   No No   Si sprays into each nostril continuous as needed for rhinitis   gabapentin (NEURONTIN) 400 mg capsule   Yes No   Sig: FERN ELIZABETH CAPSULA POR VIA ORAL ROBEL VECES AL ROGERIO   ibuprofen (MOTRIN) 600 mg tablet   No No   Sig: Take 1 tablet (600 mg total) by mouth every 8 (eight) hours as needed for fever or headaches for up to 10 days   montelukast (SINGULAIR) 10 mg tablet   No No   Sig: TAKE 1 TABLET POR VIA ORAL DIARIAMENTE   propranolol (INDERAL LA) 60 mg 24 hr capsule   No No   Sig: TAKE 1 CAPSULE (60 MG TOTAL) BY MOUTH DAILY      Facility-Administered Medications: None                        Portions of the record may have been created with voice recognition software. Occasional wrong word or "sound a like" substitutions may have occurred due to the inherent limitations of voice recognition software. Read the chart carefully and recognize, using context, where substitutions have occurred.     Electronically signed by:  Joao Jackson

## 2023-11-02 NOTE — DISCHARGE INSTRUCTIONS
You were seen in the ED for abdominal pain. Return to the ED for any worsening symptoms or new symptoms. Follow up with your primary care doctor as soon as possible.

## 2023-11-03 RX ORDER — PAROXETINE HYDROCHLORIDE 40 MG/1
40 TABLET, FILM COATED ORAL
COMMUNITY
Start: 2023-09-29 | End: 2023-11-09

## 2023-11-03 RX ORDER — MIRTAZAPINE 15 MG/1
TABLET, FILM COATED ORAL
COMMUNITY
Start: 2023-09-29

## 2023-11-08 ENCOUNTER — CONSULT (OUTPATIENT)
Dept: PULMONOLOGY | Facility: CLINIC | Age: 38
End: 2023-11-08
Payer: COMMERCIAL

## 2023-11-08 VITALS
TEMPERATURE: 98.5 F | HEART RATE: 110 BPM | OXYGEN SATURATION: 97 % | WEIGHT: 150.4 LBS | DIASTOLIC BLOOD PRESSURE: 72 MMHG | BODY MASS INDEX: 26.65 KG/M2 | HEIGHT: 63 IN | SYSTOLIC BLOOD PRESSURE: 126 MMHG

## 2023-11-08 DIAGNOSIS — J45.20 MILD INTERMITTENT ASTHMA WITHOUT COMPLICATION: Primary | ICD-10-CM

## 2023-11-08 DIAGNOSIS — F17.200 TOBACCO DEPENDENCE: ICD-10-CM

## 2023-11-08 DIAGNOSIS — Z23 ENCOUNTER FOR IMMUNIZATION: ICD-10-CM

## 2023-11-08 PROCEDURE — 94618 PULMONARY STRESS TESTING: CPT | Performed by: INTERNAL MEDICINE

## 2023-11-08 PROCEDURE — 99204 OFFICE O/P NEW MOD 45 MIN: CPT | Performed by: INTERNAL MEDICINE

## 2023-11-08 PROCEDURE — 90471 IMMUNIZATION ADMIN: CPT

## 2023-11-08 PROCEDURE — 90658 IIV3 VACCINE SPLT 0.5 ML IM: CPT

## 2023-11-08 PROCEDURE — 94010 BREATHING CAPACITY TEST: CPT | Performed by: INTERNAL MEDICINE

## 2023-11-08 RX ORDER — FLUTICASONE PROPIONATE AND SALMETEROL 100; 50 UG/1; UG/1
1 POWDER RESPIRATORY (INHALATION) 2 TIMES DAILY
Qty: 60 BLISTER | Refills: 3 | Status: SHIPPED | OUTPATIENT
Start: 2023-11-08 | End: 2023-12-08

## 2023-11-08 NOTE — PROGRESS NOTES
Pulmonary Consultation   Fabian Bravo 43 y.o. female MRN: [id number]  11/8/2023      Reason for Consultation:  SOB and left arm numbness    Requested by:  No ref. provider found     Primary care provider: Shant Collado DO    Assessment:  Encounter for immunization  Will get Flu vaccine in office    Asthma  Uncontrolled with as needed albuterol inhaler use. In office spirometry with poor effort and not likely reproducible. Will start on LABA/ICS for maintenance with as needed albuterol inhaler. Technique demonstrated for each inhaler. Can follow up as needed    Tobacco dependence    Ongoing tobacco use, counseled for > 3mins regarding smoking and contribution to ongoing migraines and asthma. She is not interested in quitting at this time    Left arm numbness  Would recommend follow up with PCP for imaging of C-spine     Plan:    Diagnoses and all orders for this visit:    Mild intermittent asthma without complication  -     POCT spirometry  -     Fluticasone-Salmeterol (Advair Diskus) 100-50 mcg/dose inhaler; Inhale 1 puff 2 (two) times a day Rinse mouth after use. -     POCT 6 minute walk    Encounter for immunization  -     Flu vaccine greater than or equal to 2yo with preservative IM    Tobacco dependence    Other orders  -     mirtazapine (REMERON) 15 mg tablet; FERN 1 TABLETA POR VIA ORAL DIARIAMENTE ANTES DE DORMIR EN LA NOCHE  -     PARoxetine (PAXIL) 40 MG tablet; Take 40 mg by mouth        History of Present Illness   HPI:  Fabian Bravo is a 43 y.o. female with PMH of asthma, migraines, tobacco abuse who presents with multiple complaints. She complains of occasional SOB, L arm numbness, nausea and dizziness. These symptoms come and go and no clear associations. She has had multiple ED visits for same. Kash sweet had full workup. No imaging of C-spine noted given complaints of left arm numbness. Denies weakness. She has a history of asthma, uses as needed albuterol inhaler and uses 4-5 times per week. Occasionally helps. Has productive cough with brown mucous. Smokes 1 ppd due to anxiety, for 15 years. Right now has headache an feels air is not going to her head. No nighttime symptoms. Not interested in quitting at this time despite negative effects to medical problems. Review of Systems   Constitutional:  Positive for fatigue. Negative for activity change, chills, diaphoresis and fever. HENT:  Positive for trouble swallowing. Negative for congestion, postnasal drip, rhinorrhea, sinus pressure, sneezing and sore throat. Eyes: Negative. Respiratory:  Positive for cough and shortness of breath. Negative for chest tightness. Cardiovascular:  Negative for chest pain, palpitations and leg swelling. Gastrointestinal:  Positive for nausea. Negative for constipation, diarrhea and vomiting. Endocrine: Negative. Genitourinary: Negative. Musculoskeletal:  Negative for back pain, joint swelling and neck pain. Skin: Negative. Allergic/Immunologic: Negative. Neurological:  Positive for dizziness, light-headedness and numbness. Negative for syncope, weakness and headaches. Hematological: Negative. Psychiatric/Behavioral: Negative. All other systems reviewed and are negative.       Historical Information   Past Medical History:   Diagnosis Date    Asthma     Chlamydia infection     Depression     IBS (irritable bowel syndrome)     Migraine     Varicose veins of both lower extremities      Past Surgical History:   Procedure Laterality Date    COLPOSCOPY       Family History   Problem Relation Age of Onset    Diabetes Brother     No Known Problems Mother     Cirrhosis Father     No Known Problems Sister     Autism Son     No Known Problems Maternal Grandmother     No Known Problems Maternal Grandfather     No Known Problems Paternal Grandmother     No Known Problems Paternal Grandfather     No Known Problems Brother     Depression Son     No Known Problems Cousin     Colon cancer Maternal Uncle        Occupational History: does not work     Social History:   Alcohol: occasionally  Smokinppd for 15 yrs, current smoker, not interested in quitting  Illicit drugs: none   Home heating system: gas  Pets:none   Hobbies: none   Exposures: none (amio, nitrofurantoin, cyclophos, asbestos, beryllium, glass cutting, mining, sandblasting, farming, woodwork, Birds, down bedding, hot tubs)    Preventative:   Influenza vaccine: will get today  Pneumonia vaccine: has not gotten    Meds/Allergies     Current Outpatient Medications:     acetaminophen (TYLENOL) 650 mg CR tablet, TAKE 1 TABLET (650 MG TOTAL) BY MOUTH EVERY 8 (EIGHT) HOURS AS NEEDED FOR MILD PAIN, Disp: 30 tablet, Rfl: 0    albuterol (PROVENTIL HFA,VENTOLIN HFA) 90 mcg/act inhaler, INHALE 2 PUFFS EVERY 6 (SIX) HOURS AS NEEDED FOR WHEEZING, Disp: 18 g, Rfl: 3    cyclobenzaprine (FLEXERIL) 10 mg tablet, Take 1 tablet (10 mg total) by mouth 2 (two) times a day as needed for muscle spasms, Disp: 20 tablet, Rfl: 0    dicyclomine (BENTYL) 10 mg capsule, TAKE 2 CAPSULES BY MOUTH 3 (THREE) TIMES A DAY AS NEEDED FOR (NAUSEA, ABDOMINAL PAIN), Disp: 90 capsule, Rfl: 0    fluticasone (FLONASE) 50 mcg/act nasal spray, 2 sprays into each nostril continuous as needed for rhinitis, Disp: 2 Bottle, Rfl: 0    gabapentin (NEURONTIN) 400 mg capsule, FERN ELIZABETH CAPSULA POR VIA ORAL ROBEL VECES AL ROGERIO, Disp: , Rfl:     ibuprofen (MOTRIN) 600 mg tablet, Take 1 tablet (600 mg total) by mouth every 8 (eight) hours as needed for fever or headaches for up to 10 days, Disp: 30 tablet, Rfl: 0    mirtazapine (REMERON) 15 mg tablet, FERN 1 TABLETA POR VIA ORAL DIARIAMENTE ANTES DE DORMIR EN LA NOCHE, Disp: , Rfl:     montelukast (SINGULAIR) 10 mg tablet, TAKE 1 TABLET POR VIA ORAL DIARIAMENTE, Disp: 180 tablet, Rfl: 0    ondansetron (ZOFRAN) 4 mg tablet, Take 1 tablet (4 mg total) by mouth every 6 (six) hours, Disp: 12 tablet, Rfl: 0    PARoxetine (PAXIL) 40 MG tablet, Take 40 mg by mouth, Disp: , Rfl:     propranolol (INDERAL LA) 60 mg 24 hr capsule, TAKE 1 CAPSULE (60 MG TOTAL) BY MOUTH DAILY, Disp: 90 capsule, Rfl: 3    QUEtiapine (SEROquel) 400 MG tablet, Take 400 mg by mouth daily at bedtime, Disp: , Rfl:     DULoxetine (CYMBALTA) 30 mg delayed release capsule, 60 mg (Patient not taking: Reported on 11/8/2023), Disp: , Rfl: 0    DULoxetine (CYMBALTA) 60 mg delayed release capsule, FERN ELIZABETH CAPSULA POR VIA ORAL DIARIAMENTE ANTES DE DORMIR EN LA NOCHE (Patient not taking: Reported on 11/8/2023), Disp: , Rfl:   No Known Allergies    Vitals: Blood pressure 126/72, pulse (!) 110, temperature 98.5 °F (36.9 °C), temperature source Tympanic, height 5' 3" (1.6 m), weight 68.2 kg (150 lb 6.4 oz), SpO2 97 %. , Body mass index is 26.64 kg/m². Physical Exam  Physical Exam  Vitals and nursing note reviewed. Constitutional:       Appearance: Normal appearance. She is normal weight. HENT:      Head: Normocephalic and atraumatic. Right Ear: External ear normal.      Left Ear: External ear normal.      Nose: Nose normal.      Mouth/Throat:      Mouth: Mucous membranes are moist.      Pharynx: Oropharynx is clear. Eyes:      Conjunctiva/sclera: Conjunctivae normal.   Cardiovascular:      Rate and Rhythm: Normal rate and regular rhythm. Pulses: Normal pulses. Heart sounds: Normal heart sounds. Pulmonary:      Effort: Pulmonary effort is normal.      Breath sounds: Normal breath sounds. Abdominal:      General: Abdomen is flat. Bowel sounds are normal.      Palpations: Abdomen is soft. Musculoskeletal:         General: No swelling or tenderness. Cervical back: Neck supple. No muscular tenderness. Skin:     General: Skin is warm and dry. Capillary Refill: Capillary refill takes less than 2 seconds. Neurological:      Mental Status: She is alert and oriented to person, place, and time. Mental status is at baseline. Cranial Nerves: No cranial nerve deficit. Sensory: No sensory deficit. Motor: No weakness. Psychiatric:         Mood and Affect: Mood normal.         Behavior: Behavior normal.         Thought Content: Thought content normal.         Judgment: Judgment normal.         Labs: I have personally reviewed pertinent lab results. Lab Results   Component Value Date    WBC 10.33 (H) 11/01/2023    HGB 15.9 (H) 11/01/2023    HCT 49.4 (H) 11/01/2023    MCV 92 11/01/2023     (H) 11/01/2023     Lab Results   Component Value Date    CALCIUM 10.6 (H) 11/01/2023    K 3.5 11/01/2023    CO2 25 11/01/2023     11/01/2023    BUN 13 11/01/2023    CREATININE 0.72 11/01/2023     No results found for: "IGE"  Lab Results   Component Value Date    ALT 20 11/01/2023    AST 15 11/01/2023    ALKPHOS 81 11/01/2023       Imaging and other studies: I have personally reviewed pertinent reports. and I have personally reviewed pertinent films in PACS  CXR 10/13/20: clear lungs  Pulmonary function testing: N/A    EKG, Pathology, and Other Studies: I have personally reviewed pertinent reports. EKG 11/1/23: Jennifer Wang MD  Pulmonary/Critical Care Fellow PGY-V  [facility]      Disclaimer: Portions of the record may have been created with voice recognition software. Occasional wrong word or "sound a like" substitutions may have occurred due to the inherent limitations of voice recognition software. Careful consideration should be taken to recognize, using context, where substitutions have occurred.

## 2023-11-09 ENCOUNTER — APPOINTMENT (OUTPATIENT)
Dept: LAB | Facility: CLINIC | Age: 38
End: 2023-11-09
Payer: COMMERCIAL

## 2023-11-09 ENCOUNTER — OFFICE VISIT (OUTPATIENT)
Dept: INTERNAL MEDICINE CLINIC | Facility: CLINIC | Age: 38
End: 2023-11-09

## 2023-11-09 VITALS
OXYGEN SATURATION: 98 % | TEMPERATURE: 98.6 F | DIASTOLIC BLOOD PRESSURE: 76 MMHG | HEART RATE: 88 BPM | BODY MASS INDEX: 26.93 KG/M2 | WEIGHT: 152 LBS | SYSTOLIC BLOOD PRESSURE: 109 MMHG

## 2023-11-09 DIAGNOSIS — R42 DIZZINESS: ICD-10-CM

## 2023-11-09 DIAGNOSIS — F41.9 ANXIETY: ICD-10-CM

## 2023-11-09 DIAGNOSIS — R20.0 LEFT ARM NUMBNESS: ICD-10-CM

## 2023-11-09 DIAGNOSIS — M54.2 NECK PAIN ON LEFT SIDE: Chronic | ICD-10-CM

## 2023-11-09 DIAGNOSIS — J45.20 MILD INTERMITTENT ASTHMA WITHOUT COMPLICATION: ICD-10-CM

## 2023-11-09 DIAGNOSIS — L98.9 SKIN LESION: ICD-10-CM

## 2023-11-09 DIAGNOSIS — R10.13 EPIGASTRIC PAIN: Primary | ICD-10-CM

## 2023-11-09 DIAGNOSIS — B35.4 TINEA CORPORIS: ICD-10-CM

## 2023-11-09 DIAGNOSIS — R10.13 EPIGASTRIC PAIN: ICD-10-CM

## 2023-11-09 PROBLEM — N92.1 MENORRHAGIA WITH IRREGULAR CYCLE: Status: RESOLVED | Noted: 2019-05-23 | Resolved: 2023-11-09

## 2023-11-09 PROBLEM — R11.0 NAUSEA: Status: RESOLVED | Noted: 2019-01-03 | Resolved: 2023-11-09

## 2023-11-09 PROBLEM — N61.0 MASTITIS: Status: RESOLVED | Noted: 2021-08-24 | Resolved: 2023-11-09

## 2023-11-09 LAB
ALBUMIN SERPL BCP-MCNC: 4.4 G/DL (ref 3.5–5)
ALP SERPL-CCNC: 73 U/L (ref 34–104)
ALT SERPL W P-5'-P-CCNC: 17 U/L (ref 7–52)
ANION GAP SERPL CALCULATED.3IONS-SCNC: 9 MMOL/L
AST SERPL W P-5'-P-CCNC: 13 U/L (ref 13–39)
BASOPHILS # BLD AUTO: 0.05 THOUSANDS/ÂΜL (ref 0–0.1)
BASOPHILS NFR BLD AUTO: 0 % (ref 0–1)
BILIRUB SERPL-MCNC: 0.19 MG/DL (ref 0.2–1)
BUN SERPL-MCNC: 15 MG/DL (ref 5–25)
CALCIUM SERPL-MCNC: 9.3 MG/DL (ref 8.4–10.2)
CHLORIDE SERPL-SCNC: 106 MMOL/L (ref 96–108)
CO2 SERPL-SCNC: 26 MMOL/L (ref 21–32)
CREAT SERPL-MCNC: 0.63 MG/DL (ref 0.6–1.3)
EOSINOPHIL # BLD AUTO: 0.18 THOUSAND/ÂΜL (ref 0–0.61)
EOSINOPHIL NFR BLD AUTO: 2 % (ref 0–6)
ERYTHROCYTE [DISTWIDTH] IN BLOOD BY AUTOMATED COUNT: 14.7 % (ref 11.6–15.1)
EST. AVERAGE GLUCOSE BLD GHB EST-MCNC: 137 MG/DL
FERRITIN SERPL-MCNC: 25 NG/ML (ref 11–307)
GFR SERPL CREATININE-BSD FRML MDRD: 113 ML/MIN/1.73SQ M
GLUCOSE P FAST SERPL-MCNC: 108 MG/DL (ref 65–99)
HBA1C MFR BLD: 6.4 %
HCT VFR BLD AUTO: 45.6 % (ref 34.8–46.1)
HGB BLD-MCNC: 14.3 G/DL (ref 11.5–15.4)
IMM GRANULOCYTES # BLD AUTO: 0.05 THOUSAND/UL (ref 0–0.2)
IMM GRANULOCYTES NFR BLD AUTO: 0 % (ref 0–2)
IRON SATN MFR SERPL: 6 % (ref 15–50)
IRON SERPL-MCNC: 23 UG/DL (ref 50–212)
LYMPHOCYTES # BLD AUTO: 2.7 THOUSANDS/ÂΜL (ref 0.6–4.47)
LYMPHOCYTES NFR BLD AUTO: 23 % (ref 14–44)
MCH RBC QN AUTO: 29.5 PG (ref 26.8–34.3)
MCHC RBC AUTO-ENTMCNC: 31.4 G/DL (ref 31.4–37.4)
MCV RBC AUTO: 94 FL (ref 82–98)
MONOCYTES # BLD AUTO: 0.79 THOUSAND/ÂΜL (ref 0.17–1.22)
MONOCYTES NFR BLD AUTO: 7 % (ref 4–12)
NEUTROPHILS # BLD AUTO: 8.2 THOUSANDS/ÂΜL (ref 1.85–7.62)
NEUTS SEG NFR BLD AUTO: 68 % (ref 43–75)
NRBC BLD AUTO-RTO: 0 /100 WBCS
PLATELET # BLD AUTO: 356 THOUSANDS/UL (ref 149–390)
PMV BLD AUTO: 10.1 FL (ref 8.9–12.7)
POTASSIUM SERPL-SCNC: 4.1 MMOL/L (ref 3.5–5.3)
PROT SERPL-MCNC: 7.4 G/DL (ref 6.4–8.4)
RBC # BLD AUTO: 4.85 MILLION/UL (ref 3.81–5.12)
SODIUM SERPL-SCNC: 141 MMOL/L (ref 135–147)
TIBC SERPL-MCNC: 369 UG/DL (ref 250–450)
TSH SERPL DL<=0.05 MIU/L-ACNC: 2.55 UIU/ML (ref 0.45–4.5)
UIBC SERPL-MCNC: 346 UG/DL (ref 155–355)
VIT B12 SERPL-MCNC: 288 PG/ML (ref 180–914)
WBC # BLD AUTO: 11.97 THOUSAND/UL (ref 4.31–10.16)

## 2023-11-09 PROCEDURE — 36415 COLL VENOUS BLD VENIPUNCTURE: CPT

## 2023-11-09 PROCEDURE — 83540 ASSAY OF IRON: CPT

## 2023-11-09 PROCEDURE — 85025 COMPLETE CBC W/AUTO DIFF WBC: CPT

## 2023-11-09 PROCEDURE — 82607 VITAMIN B-12: CPT

## 2023-11-09 PROCEDURE — 83550 IRON BINDING TEST: CPT

## 2023-11-09 PROCEDURE — 82728 ASSAY OF FERRITIN: CPT

## 2023-11-09 PROCEDURE — 83036 HEMOGLOBIN GLYCOSYLATED A1C: CPT

## 2023-11-09 PROCEDURE — 80053 COMPREHEN METABOLIC PANEL: CPT

## 2023-11-09 PROCEDURE — 84443 ASSAY THYROID STIM HORMONE: CPT

## 2023-11-09 PROCEDURE — 99214 OFFICE O/P EST MOD 30 MIN: CPT | Performed by: PHYSICIAN ASSISTANT

## 2023-11-09 RX ORDER — OMEPRAZOLE 20 MG/1
20 CAPSULE, DELAYED RELEASE ORAL DAILY
Qty: 30 CAPSULE | Refills: 2 | Status: SHIPPED | OUTPATIENT
Start: 2023-11-09

## 2023-11-09 RX ORDER — QUETIAPINE FUMARATE 300 MG/1
TABLET, FILM COATED ORAL
COMMUNITY
Start: 2023-10-27

## 2023-11-09 RX ORDER — PAROXETINE 30 MG/1
TABLET, FILM COATED ORAL
COMMUNITY
Start: 2023-08-03

## 2023-11-09 RX ORDER — CLOTRIMAZOLE 1 %
CREAM (GRAM) TOPICAL 2 TIMES DAILY
Qty: 45 G | Refills: 1 | Status: SHIPPED | OUTPATIENT
Start: 2023-11-09

## 2023-11-09 NOTE — ASSESSMENT & PLAN NOTE
At the end of the visit she mentioned a spot on her right jaw line. She reports it changes in size, denies pain or redness. Likely a cyst or steatoma. Will refer to dermatology for possible drainage/removal. Patient agreeable.

## 2023-11-09 NOTE — ASSESSMENT & PLAN NOTE
Patient is a current smoker, which is likely contributing to current respiratory complaints. She did have a POC spirometry test yesterday at the pulmonologist: spirometry with poor effort and not likely reproducible. She was started on Advair 100-50 mcg/ACT twice daily. She has not picked it up yet. Discussed starting Advair. Rinse mouth after use and continue albuterol as needed. Will recheck breathing complaints at next visit.

## 2023-11-09 NOTE — ASSESSMENT & PLAN NOTE
Vague complaints, unremarkable exam. Will evaluate further with lab work. Recommend increasing water intake. Encouraged healthy well balanced diet. Avoid alcohol and caffeine. ER precautions given.

## 2023-11-09 NOTE — PROGRESS NOTES
Name: Zohra Alcazar      : 1985      MRN: [id number]  Encounter Provider: Moshe Singleton PA-C  Encounter Date: 2023   Encounter department: [address] [place] [address]    Assessment & Plan     1. Epigastric pain  Assessment & Plan:  CBC, CMP, UA, urine HCG, and lipase in the ED unremarkable. Normal CT abdomen and pelvis. Discussed possible causes including GERD, gastritis, PUD, and H. Pylori. Agreeable to stool testing for H. Pylori. Reviewed anti-GERD diet. Avoid NSAIDs. Avoid alcohol and caffeine. Start omeprazole 20 mg daily. Will recheck at follow up visit in one month. Consider GI referral for possible EGD. Orders:  -     H. pylori antigen, stool; Future  -     omeprazole (PriLOSEC) 20 mg delayed release capsule; Take 1 capsule (20 mg total) by mouth daily    2. Neck pain on left side  Assessment & Plan:  Possible cervical radiculopathy. Will evaluate further with cervical spine x-ray and then later possible cervical MRI. Anxiety possibly contributing. Will likely end up referring to spine and pain. Continue tylenol as needed and Flexeril as needed. Declines physical therapy. Stretch throughout the day and use good body mechanics. Orders:  -     XR spine cervical 2 or 3 vw injury; Future; Expected date: 2023    3. Left arm numbness    4. Mild intermittent asthma without complication  Assessment & Plan:  Patient is a current smoker, which is likely contributing to current respiratory complaints. She did have a POC spirometry test yesterday at the pulmonologist: spirometry with poor effort and not likely reproducible. She was started on Advair 100-50 mcg/ACT twice daily. She has not picked it up yet. Discussed starting Advair. Rinse mouth after use and continue albuterol as needed. Will recheck breathing complaints at next visit. 5. Dizziness  Assessment & Plan:  Vague complaints, unremarkable exam. Will evaluate further with lab work.  Recommend increasing water intake. Encouraged healthy well balanced diet. Avoid alcohol and caffeine. ER precautions given. Orders:  -     CBC and differential; Future  -     Comprehensive metabolic panel; Future  -     TSH, 3rd generation with Free T4 reflex; Future  -     HEMOGLOBIN A1C W/ EAG ESTIMATION; Future  -     Iron Panel (Includes Ferritin, Iron Sat%, Iron, and TIBC); Future  -     Vitamin B12; Future    6. Tinea corporis  Assessment & Plan:  Consistent with tinea. Discussed overall skin care and hygiene. Start clotrimazole twice daily. Orders:  -     clotrimazole (LOTRIMIN) 1 % cream; Apply topically 2 (two) times a day    7. Skin lesion  Assessment & Plan: At the end of the visit she mentioned a spot on her right jaw line. She reports it changes in size, denies pain or redness. Likely a cyst or steatoma. Will refer to dermatology for possible drainage/removal. Patient agreeable. Orders:  -     Ambulatory Referral to Dermatology; Future    8. Anxiety  Assessment & Plan:  Complex medication regimen. Encouraged regular follow up with [facility]. Subjective      Patient is a 39-year-old female presenting for ER follow-up. She was originally seen in the ER on November 1 with complaints of epigastric pain, nausea, and vomiting. She states she has not vomited since, but has intermittent reflux and indigestion. Sometimes spits up stomach acid and bile. Zofran has not been helping. Denies fever or chills. Denies congestion, rhinorrhea, postnasal drip. Denies sore throat. She does have a productive cough, brown phlegm. Admits to intermittent wheezing and shortness of breath. Denies chest pain or palpitations. Denies diarrhea or constipation. She does states the epigastric pain is constant, but sometimes worse than others. She has noticed sometimes it is worse after eating and drinking. She is also complaining of intermittent dizziness. She cannot describe it. Denies room spinning or feeling lightheaded. States she sometimes feels faint. It does not occur every day. She is unsure how often it occurs. Denies headaches. She is also complaining of left arm pain and neck pain. This has been present for very long time. She states it has gotten worse. She also has numbness and tingling in the same locations. She states the numbness and tingling is her left neck shoulder, arm, and now into her face. Denies weakness. They have tried various medications in the past.  She recalls trying Flexeril which gave mild relief. She does not recall the other medications they gave her. Currently still taking Flexeril as needed. Denies involvement of the right neck and arm. No other back pain. She does admit to a strong history of anxiety. States she has had it for a long time, and it is severe. She does have a counselor and psychiatrist that she sees at least once a month. They did recently change her medications. She feels the medications never help. States she still has bad anxiety, she feels it is worsening over time. She is also complaining of a rash that started on her right arm about a week ago. States it is small thoughts, 2. Denies spreading otherwise. No trigger or provocation that she is aware of. No new soaps, lotions, or detergents. Nothing seems to make it better or worse. She has not tried anything for it yet. Denies pain, but has itching. Use of  services was utilized during visit. Review of Systems   Constitutional:  Positive for appetite change (decreased). Negative for chills, diaphoresis, fatigue, fever and unexpected weight change. HENT:  Negative for congestion, hearing loss, postnasal drip, rhinorrhea, sinus pressure, sinus pain, sore throat, tinnitus and trouble swallowing. Eyes:  Negative for visual disturbance. Respiratory:  Positive for cough, shortness of breath and wheezing. Negative for chest tightness.     Cardiovascular:  Negative for chest pain, palpitations and leg swelling. Gastrointestinal:  Positive for abdominal pain, nausea and vomiting. Negative for abdominal distention, blood in stool, constipation and diarrhea. Endocrine: Negative for cold intolerance, heat intolerance, polydipsia, polyphagia and polyuria. Genitourinary:  Negative for decreased urine volume, difficulty urinating, dysuria, flank pain, frequency, hematuria, pelvic pain, urgency, vaginal bleeding, vaginal discharge and vaginal pain. Musculoskeletal:  Positive for arthralgias, myalgias, neck pain and neck stiffness. Negative for back pain, gait problem and joint swelling. Skin:  Positive for rash. Neurological:  Positive for dizziness, light-headedness and numbness. Negative for tremors, syncope, weakness and headaches. Hematological:  Negative for adenopathy. Psychiatric/Behavioral:  Positive for dysphoric mood and sleep disturbance. Negative for self-injury and suicidal ideas. The patient is nervous/anxious.         Current Outpatient Medications on File Prior to Visit   Medication Sig    albuterol (PROVENTIL HFA,VENTOLIN HFA) 90 mcg/act inhaler INHALE 2 PUFFS EVERY 6 (SIX) HOURS AS NEEDED FOR WHEEZING    cyclobenzaprine (FLEXERIL) 10 mg tablet Take 1 tablet (10 mg total) by mouth 2 (two) times a day as needed for muscle spasms    dicyclomine (BENTYL) 10 mg capsule TAKE 2 CAPSULES BY MOUTH 3 (THREE) TIMES A DAY AS NEEDED FOR (NAUSEA, ABDOMINAL PAIN)    fluticasone (FLONASE) 50 mcg/act nasal spray 2 sprays into each nostril continuous as needed for rhinitis    Fluticasone-Salmeterol (Advair Diskus) 100-50 mcg/dose inhaler Inhale 1 puff 2 (two) times a day Rinse mouth after use.    gabapentin (NEURONTIN) 400 mg capsule FERN ELIZABETH CAPSULA POR VIA ORAL ROBEL VECES AL ROGERIO    mirtazapine (REMERON) 15 mg tablet FERN 1 TABLETA POR VIA ORAL DIARIAMENTE ANTES DE DORMIR EN LA NOCHE    montelukast (SINGULAIR) 10 mg tablet TAKE 1 TABLET POR VIA ORAL DIARIAMENTE    PARoxetine (PAXIL) 30 mg tablet FERN 1 TABLETA POR VIA ORAL DIARIAMENTE JUDY INDICADO    propranolol (INDERAL LA) 60 mg 24 hr capsule TAKE 1 CAPSULE (60 MG TOTAL) BY MOUTH DAILY    QUEtiapine (SEROquel) 300 mg tablet FERN 1 TABLETA POR VIA ORAL ANTES DE DORMIR EN LA NOCHE    [DISCONTINUED] acetaminophen (TYLENOL) 650 mg CR tablet TAKE 1 TABLET (650 MG TOTAL) BY MOUTH EVERY 8 (EIGHT) HOURS AS NEEDED FOR MILD PAIN    [DISCONTINUED] ibuprofen (MOTRIN) 600 mg tablet Take 1 tablet (600 mg total) by mouth every 8 (eight) hours as needed for fever or headaches for up to 10 days    [DISCONTINUED] ondansetron (ZOFRAN) 4 mg tablet Take 1 tablet (4 mg total) by mouth every 6 (six) hours    [DISCONTINUED] DULoxetine (CYMBALTA) 30 mg delayed release capsule 60 mg (Patient not taking: Reported on 11/8/2023)    [DISCONTINUED] DULoxetine (CYMBALTA) 60 mg delayed release capsule FERN ELIZABETH CAPSULA POR VIA ORAL DIARIAMENTE ANTES DE DORMIR EN LA NOCHE (Patient not taking: Reported on 11/8/2023)    [DISCONTINUED] PARoxetine (PAXIL) 40 MG tablet Take 40 mg by mouth    [DISCONTINUED] QUEtiapine (SEROquel) 400 MG tablet Take 400 mg by mouth daily at bedtime       Objective     /76 (BP Location: Right arm, Patient Position: Sitting, Cuff Size: Standard)   Pulse 88   Temp 98.6 °F (37 °C) (Temporal)   Wt 68.9 kg (152 lb)   SpO2 98%   BMI 26.93 kg/m²     Physical Exam  Vitals and nursing note reviewed. Constitutional:       General: She is awake. She is not in acute distress. Appearance: Normal appearance. She is well-developed, well-groomed and overweight. She is not ill-appearing. HENT:      Head: Normocephalic and atraumatic. Right Ear: Tympanic membrane, ear canal and external ear normal.      Left Ear: Tympanic membrane, ear canal and external ear normal.      Nose: Nose normal.      Mouth/Throat:      Mouth: Mucous membranes are moist.      Pharynx: Oropharynx is clear.  No oropharyngeal exudate or posterior oropharyngeal erythema. Eyes:      General: Lids are normal. No scleral icterus. Extraocular Movements: Extraocular movements intact. Right eye: Normal extraocular motion and no nystagmus. Left eye: Normal extraocular motion and no nystagmus. Conjunctiva/sclera: Conjunctivae normal.      Pupils: Pupils are equal, round, and reactive to light. Comments: Negative Alonzo Hallpike bilaterally   Neck:      Vascular: No carotid bruit. Cardiovascular:      Rate and Rhythm: Normal rate and regular rhythm. Pulses: Normal pulses. Radial pulses are 2+ on the right side and 2+ on the left side. Heart sounds: Normal heart sounds. No murmur heard. Pulmonary:      Effort: Pulmonary effort is normal. No respiratory distress. Breath sounds: Normal breath sounds and air entry. No decreased air movement. No decreased breath sounds, wheezing, rhonchi or rales. Abdominal:      General: Abdomen is flat. Bowel sounds are normal. There is no distension. Palpations: Abdomen is soft. There is no mass. Tenderness: There is no abdominal tenderness. There is no right CVA tenderness, left CVA tenderness, guarding or rebound. Hernia: No hernia is present. Musculoskeletal:         General: Normal range of motion. Cervical back: Neck supple. Right lower leg: No edema. Left lower leg: No edema. Lymphadenopathy:      Cervical: No cervical adenopathy. Skin:     General: Skin is warm. Capillary Refill: Capillary refill takes less than 2 seconds. Coloration: Skin is not jaundiced. Findings: No rash. Comments: Two small mildly erythematous plaques with central clearing and excoriated edges on right forearm   Neurological:      General: No focal deficit present. Mental Status: She is alert and oriented to person, place, and time. Mental status is at baseline. Cranial Nerves: Cranial nerves 2-12 are intact. Sensory: Sensation is intact.       Motor: Motor function is intact. Coordination: Coordination is intact. Gait: Gait is intact. Comments: Upper and lower extremity strength 5/5, equal and symmetric. Normal tone. Psychiatric:         Attention and Perception: Attention normal.         Mood and Affect: Mood and affect normal.         Speech: Speech normal.         Behavior: Behavior normal. Behavior is cooperative.        Sydney Alcantar PA-C

## 2023-11-09 NOTE — ASSESSMENT & PLAN NOTE
CBC, CMP, UA, urine HCG, and lipase in the ED unremarkable. Normal CT abdomen and pelvis. Discussed possible causes including GERD, gastritis, PUD, and H. Pylori. Agreeable to stool testing for H. Pylori. Reviewed anti-GERD diet. Avoid NSAIDs. Avoid alcohol and caffeine. Start omeprazole 20 mg daily. Will recheck at follow up visit in one month. Consider GI referral for possible EGD.

## 2023-11-09 NOTE — ASSESSMENT & PLAN NOTE
Possible cervical radiculopathy. Will evaluate further with cervical spine x-ray and then later possible cervical MRI. Anxiety possibly contributing. Will likely end up referring to spine and pain. Continue tylenol as needed and Flexeril as needed. Declines physical therapy. Stretch throughout the day and use good body mechanics.

## 2023-11-10 ENCOUNTER — TELEPHONE (OUTPATIENT)
Dept: ADMINISTRATIVE | Facility: OTHER | Age: 38
End: 2023-11-10

## 2023-11-10 ENCOUNTER — HOSPITAL ENCOUNTER (OUTPATIENT)
Dept: RADIOLOGY | Facility: HOSPITAL | Age: 38
Discharge: HOME | End: 2023-11-10
Payer: COMMERCIAL

## 2023-11-10 ENCOUNTER — APPOINTMENT (OUTPATIENT)
Dept: LAB | Facility: CLINIC | Age: 38
End: 2023-11-10
Payer: COMMERCIAL

## 2023-11-10 DIAGNOSIS — M54.2 NECK PAIN ON LEFT SIDE: Chronic | ICD-10-CM

## 2023-11-10 PROCEDURE — 72040 X-RAY EXAM NECK SPINE 2-3 VW: CPT

## 2023-11-10 PROCEDURE — 87338 HPYLORI STOOL AG IA: CPT

## 2023-11-10 NOTE — TELEPHONE ENCOUNTER
----- Message from Jarett Galvez PA-C sent at 11/9/2023  7:20 AM EST -----  11/09/23 7:20 AM    Hello, our patient Gloria Mane has had Hepatitis C and HIV completed/performed. Please assist in updating the patient chart by pulling the Care Everywhere (CE) document. The date of service is 9/19/2016.      Thank you,  BHARATH Lorenzana

## 2023-11-11 LAB — H PYLORI AG STL QL IA: POSITIVE

## 2023-11-13 DIAGNOSIS — E53.8 LOW SERUM VITAMIN B12: Primary | ICD-10-CM

## 2023-11-13 DIAGNOSIS — A04.8 H. PYLORI INFECTION: ICD-10-CM

## 2023-11-13 PROBLEM — R73.03 PREDIABETES: Status: ACTIVE | Noted: 2023-11-13

## 2023-11-13 RX ORDER — TETRACYCLINE HYDROCHLORIDE 500 MG/1
500 CAPSULE ORAL 4 TIMES DAILY
Qty: 56 CAPSULE | Refills: 0 | Status: SHIPPED | OUTPATIENT
Start: 2023-11-13 | End: 2023-11-27

## 2023-11-13 RX ORDER — BISMUTH SUBSALICYLATE 262 MG/1
262 TABLET, CHEWABLE ORAL
Qty: 56 TABLET | Refills: 0 | Status: SHIPPED | OUTPATIENT
Start: 2023-11-13 | End: 2023-11-27

## 2023-11-13 RX ORDER — OMEPRAZOLE 20 MG/1
20 CAPSULE, DELAYED RELEASE ORAL 2 TIMES DAILY
Qty: 28 CAPSULE | Refills: 0 | Status: SHIPPED | OUTPATIENT
Start: 2023-11-13 | End: 2023-11-27

## 2023-11-13 RX ORDER — METRONIDAZOLE 250 MG/1
250 TABLET ORAL 4 TIMES DAILY
Qty: 56 TABLET | Refills: 0 | Status: SHIPPED | OUTPATIENT
Start: 2023-11-13 | End: 2023-11-27

## 2023-11-13 RX ORDER — CYANOCOBALAMIN (VITAMIN B-12) 250 MCG
250 TABLET ORAL DAILY
Qty: 90 TABLET | Refills: 1 | Status: SHIPPED | OUTPATIENT
Start: 2023-11-13

## 2023-11-14 NOTE — TELEPHONE ENCOUNTER
Upon review of the In Basket request we were able to locate, review, and update the patient chart as requested for Hepatitis C  and HIV. Any additional questions or concerns should be emailed to the Practice Liaisons via the appropriate education email address, please do not reply via In Basket.     Thank you  Beau Mcintyre

## 2023-11-17 ENCOUNTER — TELEPHONE (OUTPATIENT)
Dept: INTERNAL MEDICINE CLINIC | Facility: CLINIC | Age: 38
End: 2023-11-17

## 2023-11-17 NOTE — TELEPHONE ENCOUNTER
Patient would like her results for her xray that was done on 11/10. I explained to patient that results will be given during visit on 12/11. Patient said she is in a lot of pain and would like a call to discuss results instead.

## 2023-11-17 NOTE — TELEPHONE ENCOUNTER
Patient is requesting an alternative medication for tetracycline (ACHROMYCIN,SUMYCIN) 500 MG capsule.  This medication is not covered by patient's insurance

## 2023-11-20 ENCOUNTER — OFFICE VISIT (OUTPATIENT)
Dept: DENTISTRY | Facility: CLINIC | Age: 38
End: 2023-11-20

## 2023-11-20 VITALS — HEART RATE: 79 BPM | DIASTOLIC BLOOD PRESSURE: 74 MMHG | SYSTOLIC BLOOD PRESSURE: 107 MMHG

## 2023-11-20 DIAGNOSIS — Z01.21 ENCOUNTER FOR DENTAL EXAMINATION AND CLEANING WITH ABNORMAL FINDINGS: Primary | ICD-10-CM

## 2023-11-20 DIAGNOSIS — R20.0 LEFT ARM NUMBNESS: ICD-10-CM

## 2023-11-20 DIAGNOSIS — M54.2 NECK PAIN ON LEFT SIDE: Primary | ICD-10-CM

## 2023-11-20 PROCEDURE — D0210 INTRAORAL - COMPLETE SERIES OF RADIOGRAPHIC IMAGES: HCPCS

## 2023-11-20 PROCEDURE — D0140 LIMITED ORAL EVALUATION - PROBLEM FOCUSED: HCPCS

## 2023-11-20 NOTE — TELEPHONE ENCOUNTER
Does the pharmacy know what is in her formulary? It may be another strength of tetracycline or even capsule vs tablet. Also please make sure patient did not start her other pills. It is important she starts all four medications at the same time.    Thank you

## 2023-11-20 NOTE — DENTAL PROCEDURE DETAILS
LIMITED EXAM AND FMX    CC: " It has been about 10 yrs since I had a cleaning and gums hurt on lower left."    HHX reviewed  PAIN:0/10    Radiographs taken: Lexi Chavez did exam-  Findings/recommendations:  Explained to patient that a full mouth debridement is needed to be able to complete a comprehensive exam.       Pt dismissed in good health, no complications and all questions answered.      NV: Full mouth debridement

## 2023-11-20 NOTE — TELEPHONE ENCOUNTER
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 This could be pinching a nerve. MRI is the next step. I will place the order and she can call to schedule.  Thank you

## 2023-11-24 ENCOUNTER — TELEPHONE (OUTPATIENT)
Dept: INTERNAL MEDICINE CLINIC | Facility: CLINIC | Age: 38
End: 2023-11-24

## 2023-11-24 NOTE — TELEPHONE ENCOUNTER
Called and spoke to pharmacist who stated this only comes in capsules and it needs a prior auth, will start prior auth.

## 2023-11-28 ENCOUNTER — TELEPHONE (OUTPATIENT)
Dept: INTERNAL MEDICINE CLINIC | Facility: CLINIC | Age: 38
End: 2023-11-28

## 2023-11-28 DIAGNOSIS — M54.2 NECK PAIN ON LEFT SIDE: Primary | ICD-10-CM

## 2023-11-28 DIAGNOSIS — R20.0 LEFT ARM NUMBNESS: ICD-10-CM

## 2023-11-28 RX ORDER — DIAZEPAM 2 MG/1
TABLET ORAL
Qty: 1 TABLET | Refills: 0 | Status: SHIPPED | OUTPATIENT
Start: 2023-11-28

## 2023-11-28 NOTE — TELEPHONE ENCOUNTER
Is she taking her Flexeril and gabapentin? If so I can try to modify dosing, but other than that she could see pain management. If she is taking them, what doses and how often?

## 2023-11-28 NOTE — TELEPHONE ENCOUNTER
The MRI is denied at this time. Her insurance requires her to complete 6 weeks of physical therapy. If she does not improve in that time, then we can proceed with the MRI.

## 2023-11-28 NOTE — TELEPHONE ENCOUNTER
Patient return call, made patient aware as per note and provided list of numbers to call for PT. Patient understood. Patient did state if she can have medication prescribed for her neck pain.     Please review and advise

## 2023-11-28 NOTE — TELEPHONE ENCOUNTER
This patient was scheduled for  MRI CERVICAL SPINE . The information I submitted was not enough to get it approved, so [facility] is requesting a [id number]. If you are able to do so, the number is below. If you wish to withdraw this request let us know so we can call central scheduling to cancel their upcoming appointment. RONI:  [phone]  Tracking [id number]   Insurance: [employer]   ID#: [id number]           Attending NPI [id number] , Jimmy Ortega, case is time sensitive please response writhen 24hr of this message. Please let me know and thank you.

## 2023-12-01 DIAGNOSIS — M54.2 NECK PAIN: ICD-10-CM

## 2023-12-01 RX ORDER — CYCLOBENZAPRINE HCL 10 MG
10 TABLET ORAL 2 TIMES DAILY PRN
Qty: 30 TABLET | Refills: 2 | Status: SHIPPED | OUTPATIENT
Start: 2023-12-01

## 2023-12-01 NOTE — TELEPHONE ENCOUNTER
Called using  [id number], patient not taking flexeril and has not taken it in a while. Patient requesting refill on flexeril. Patient will continue gabapentin as prescribed. Patient would also like a referral for pain management.

## 2023-12-04 ENCOUNTER — EVALUATION (OUTPATIENT)
Dept: PHYSICAL THERAPY | Facility: REHABILITATION | Age: 38
End: 2023-12-04
Payer: COMMERCIAL

## 2023-12-04 DIAGNOSIS — R20.0 LEFT ARM NUMBNESS: Primary | ICD-10-CM

## 2023-12-04 DIAGNOSIS — M54.2 NECK PAIN ON LEFT SIDE: ICD-10-CM

## 2023-12-04 DIAGNOSIS — M54.12 CERVICAL RADICULOPATHY: ICD-10-CM

## 2023-12-04 PROCEDURE — 97110 THERAPEUTIC EXERCISES: CPT | Performed by: PHYSICAL THERAPIST

## 2023-12-04 PROCEDURE — 97161 PT EVAL LOW COMPLEX 20 MIN: CPT | Performed by: PHYSICAL THERAPIST

## 2023-12-04 NOTE — PROGRESS NOTES
PT Evaluation     Today's date: 2023  Patient name: Roosevelt Cuenca  : 1985  MRN: [id number]  Referring provider: Tonya Muhammad  Dx:   Encounter Diagnosis     ICD-10-CM    1. Left arm numbness  R20.0 Ambulatory Referral to Physical Therapy      2. Neck pain on left side  M54.2 Ambulatory Referral to Physical Therapy      3. Cervical radiculopathy  M54.12                      Assessment  Impairments: abnormal coordination, abnormal muscle firing, abnormal or restricted ROM, abnormal movement, activity intolerance, impaired balance, impaired physical strength, lacks appropriate home exercise program, pain with function, poor posture  and poor body mechanics  Functional limitations: pushing, pulling lifting, dressing,  Symptom irritability: highBarriers to therapy: Roosevelt Cuenca is a 43 y.o. female presenting with subacute cervical radiculopathy. Primary impairments include decreased ROM, weakness, pain, parathesia, postural dysfucntion. Will benefit from skilled PT interventions for return to PLOF. HEP was provided and reviewed. Patient is able to complete HEP with good technique and appropriate pain response. Patient expressed understanding of appropriate dosage and frequency of HEP. No additional referral necessary.      Understanding of Dx/Px/POC: good   Prognosis: good    Plan  Patient would benefit from: skilled physical therapy  Planned therapy interventions: joint mobilization, manual therapy, massage, Cronin taping, neuromuscular re-education, patient education, postural training, self care, strengthening, stretching, therapeutic activities, therapeutic exercise, therapeutic training, graded exercise, graded activity, home exercise program, flexibility, functional ROM exercises, balance and activity modification  Frequency: 2x week  Duration in weeks: 8  Treatment plan discussed with: patient    Subjective  Pain Location: Neck, L arm pain, Headaches  Pain Intensity: 8/10  KATHERINE: gradual progressive  DOI: >6 months  Provoked by: stress, cleaning, dressing, activity, OH activity  Eased Positions: ICE, flexeril  Constitutional S/S: denies   Dominant Hand: R  Goals: return to PLOF  PLOF: Independent home activities    Objective    Red Flags:none  Postural Findings:            Head Position x Protracted  Neutral  Retracted   Scapular Position x Protracted  Neutral  Retracted   Thoracic Spine x Inc Kyphosis  Neutral     Lumbar Spine  Inc Lordosis  Neutral x Dec Lordosis       Strength and ROM evaluated B from a regional biomechanical perspective and values relevant to this episode recorded in tables below    Range of Motion measurements for the Cervical Spine (in degrees)     Joint Motion Right:  Left:    Flexion 60*    Extension 10*    Rotation 70 35   Lateral Flexion 25 15     Neuro Screen:  Negative CN Screen    Reflexes  Right Left   Biceps (C5-C6) 3+ 3+   Brachioradialis (C5-C6) 3+ 3+   Triceps (C7-C8) 3+ 3+   Thomas's n n   Clonus n n     UE Myotomes:  Nerve Root Test Action RIGHT  LEFT    C3 Neck side flexion 5 5   C4 Shoulder elevation 5 5   C5 Shoulder abduction 5 5   C6 Elbow Flexion and wrist extension 5 5   C7 Elbow extension and wrist flexion 5 5   C8 Thumb extension and ulnar deviation 5 5   T1 Hand intrinsics 5 5     UE Dermatomes Notes: intermittent L UE paresthesia, inconclusive, possible C6 distribution    Palpation: General hyperalesia, P L upp trap, LS, Subocciptials    Directional Preference: RET    Joint Mobility:decreased pain/centralization with L C5-6, C6-7 opening    Cervical Vascular Screenin-D's   Dizziness   Diploplia   Drop Attacks   Dysphagia   Dysarthria  3-N's   Nausea   Nystagmus    Numbness  The above were all  Negative    Upper Cervical Ligament Testing:   Transverse ligament stress test   Alar Ligament stress test   Sharp-Royce   *The above were all  Negative    Cervical Radiculopathy Test Item Cluster Frances ZUÑIGA et al. Kimberley Ortiz, 2003)  Any 3 above + = + LR of 6.1  Any 4 above + = + LR of 30.3  Test / Measure  12/4/2023   Upper Limb Tension Test A +   Spurling's A +   Distraction +   C-Rotation < 60 ipsilateral side +     Dural Mobility:   Median Nerve: +  Radial Nerve:-  Ulnar Nerve: +         Precautions: ESL,     POC expires Unit limit Auth Expiration date PT/OT + Visit Limit?   2/4/24 BOMN  na         Visit/Unit Tracking  AUTH Status:  Date 12/4/23   Auth after 24 visits Used 1    Remaining  23      Pertinent Findings:      POC End Date: 2/4/23                                                                                          Test / Measure  9/15/2022   FOTO (Predicted 58) 47   L ROT 35   Distal most s/s L hand     Manuals             C/S Ddx             C/S upglides             C/S STM                          Neuro Re-Ed             C/S RET             LS S             Upp trap S                                                                 Ther Ex             HEP Review 8'                                                                                                       Ther Activity                                       Gait Training                                       Modalities

## 2023-12-11 ENCOUNTER — OFFICE VISIT (OUTPATIENT)
Dept: PHYSICAL THERAPY | Facility: REHABILITATION | Age: 38
End: 2023-12-11
Payer: COMMERCIAL

## 2023-12-11 DIAGNOSIS — M54.12 CERVICAL RADICULOPATHY: ICD-10-CM

## 2023-12-11 DIAGNOSIS — M54.2 NECK PAIN ON LEFT SIDE: Primary | ICD-10-CM

## 2023-12-11 DIAGNOSIS — R20.0 LEFT ARM NUMBNESS: ICD-10-CM

## 2023-12-11 PROCEDURE — 97140 MANUAL THERAPY 1/> REGIONS: CPT | Performed by: PHYSICAL THERAPIST

## 2023-12-11 PROCEDURE — 97112 NEUROMUSCULAR REEDUCATION: CPT | Performed by: PHYSICAL THERAPIST

## 2023-12-11 PROCEDURE — 97110 THERAPEUTIC EXERCISES: CPT | Performed by: PHYSICAL THERAPIST

## 2023-12-11 NOTE — PROGRESS NOTES
Daily Note     Today's date: 2023  Patient name: Lady Palacio  : 1985  MRN: [id number]  Referring provider: Ruth Antunez  Dx:   Encounter Diagnosis     ICD-10-CM    1. Neck pain on left side  M54.2       2. Left arm numbness  R20.0       3. Cervical radiculopathy  M54.12                      Subjective: Pt reports L UE numbness, as well as pain in cervical spine. Objective: See treatment diary below      Assessment: Tolerated treatment well. Pt notes some symptom relief post session. Monitor response NV. Patient would benefit from continued PT      Plan: Continue per plan of care. Progress treatment as tolerated.        Precautions: ESL,     POC expires Unit limit Auth Expiration date PT/OT + Visit Limit?   24 BOMN  na         Visit/Unit Tracking  AUTH Status:  Date 23   Auth after 24 visits Used 1 2    Remaining        Pertinent Findings:      POC End Date: 23                                                                                          Test / Measure  9/15/2022   FOTO (Predicted 58) 47   L ROT 35   Distal most s/s L hand     Manuals             C/S Ddx TP 5'            C/S upglides TP 3'            C/S STM TP 5'                         Neuro Re-Ed             C/S RET 5"x10            LS S 10"x10            Upp trap S 10"x10                                                                Ther Ex             HEP Review             UBE Retro 90 rpm x5'            TB/dana LPD Otb 2x10            TB/dana rows Otb 2x10                                                                Ther Activity                                       Gait Training                                       Modalities

## 2023-12-14 ENCOUNTER — APPOINTMENT (OUTPATIENT)
Dept: PHYSICAL THERAPY | Facility: REHABILITATION | Age: 38
End: 2023-12-14
Payer: COMMERCIAL

## 2023-12-15 ENCOUNTER — OFFICE VISIT (OUTPATIENT)
Dept: PHYSICAL THERAPY | Facility: REHABILITATION | Age: 38
End: 2023-12-15
Payer: COMMERCIAL

## 2023-12-15 DIAGNOSIS — R20.0 LEFT ARM NUMBNESS: ICD-10-CM

## 2023-12-15 DIAGNOSIS — M54.2 NECK PAIN ON LEFT SIDE: Primary | ICD-10-CM

## 2023-12-15 PROCEDURE — 97110 THERAPEUTIC EXERCISES: CPT | Performed by: PHYSICAL THERAPIST

## 2023-12-15 PROCEDURE — 97112 NEUROMUSCULAR REEDUCATION: CPT | Performed by: PHYSICAL THERAPIST

## 2023-12-15 PROCEDURE — 97140 MANUAL THERAPY 1/> REGIONS: CPT | Performed by: PHYSICAL THERAPIST

## 2023-12-15 NOTE — PROGRESS NOTES
Daily Note     Today's date: 12/15/2023  Patient name: Zion Lundberg  : 1985  MRN: [id number]  Referring provider: Jolie Morrissey  Dx:   Encounter Diagnosis     ICD-10-CM    1. Neck pain on left side  M54.2       2. Left arm numbness  R20.0           Start Time: 1050  Stop Time: 1130  Total time in clinic (min): 40 minutes    Subjective: Pt reports minimal change in status    Objective: See treatment diary below    Assessment: Tolerated treatment well. Patient would benefit from continued PT 1:1 with Eive Luque DPT for entirety of tx. Pt tolerated tx well with improved C/s RET motor control. Added c/s ret +ext to HEP. Plan: Continue per plan of care.       Precautions: ESL,     POC expires Unit limit Auth Expiration date PT/OT + Visit Limit?   24 BOMN  na         Visit/Unit Tracking  AUTH Status:  Date 12/4/23 12/11/23 12/15/23   Auth after 24 visits Used 1 2 3    Remaining  23 22 21      Pertinent Findings:      POC End Date: 23                                                                                          Test / Measure  9/15/2022   FOTO (Predicted 58) 47   L ROT 35   Distal most s/s L hand     Manuals 12/11 12/15   C/S Ddx TP 5' KS 3'   C/S upglides TP 3' KS 3'   C/S STM TP 5' KS 5'        Neuro Re-Ed     C/S RET 5"x10 5"x10 supine   LS S 10"x10 2x30"   Upp trap S 10"x10 2x30"    Shrugs  3x10   C/S RET+ ext  2x15             Ther Ex     HEP Review     UBE Retro 90 rpm x5' Retro 90 rpm x5'   TB/dana LPD Otb 2x10 Otb 2x10   TB/dana rows Otb 2x10 Otb 2x10                       Ther Activity               Gait Training               Modalities

## 2023-12-19 ENCOUNTER — OFFICE VISIT (OUTPATIENT)
Dept: PHYSICAL THERAPY | Facility: REHABILITATION | Age: 38
End: 2023-12-19
Payer: COMMERCIAL

## 2023-12-19 DIAGNOSIS — R20.0 LEFT ARM NUMBNESS: ICD-10-CM

## 2023-12-19 DIAGNOSIS — M54.2 NECK PAIN ON LEFT SIDE: Primary | ICD-10-CM

## 2023-12-19 DIAGNOSIS — M54.12 CERVICAL RADICULOPATHY: ICD-10-CM

## 2023-12-19 PROCEDURE — 97110 THERAPEUTIC EXERCISES: CPT | Performed by: PHYSICAL THERAPIST

## 2023-12-19 PROCEDURE — 97112 NEUROMUSCULAR REEDUCATION: CPT | Performed by: PHYSICAL THERAPIST

## 2023-12-19 PROCEDURE — 97140 MANUAL THERAPY 1/> REGIONS: CPT | Performed by: PHYSICAL THERAPIST

## 2023-12-19 NOTE — PROGRESS NOTES
"Daily Note     Today's date: 2023  Patient name: Zelda Mayo  : 1985  MRN: [id number]  Referring provider: Mary Silva PA-C  Dx:   Encounter Diagnosis     ICD-10-CM    1. Neck pain on left side  M54.2       2. Cervical radiculopathy  M54.12       3. Left arm numbness  R20.0                      Subjective: Minimal change in status per pt.    Objective: See treatment diary below    Assessment: Tolerated treatment well. Patient would benefit from continued PT 1:1 with Brayan Rhodes DPT for entirety of tx. Pt demonstrates reduced s/s with distraction. No c/o increased pain with increased TB intensity with rows and LPDs.     Plan: Continue per plan of care.      Precautions: ESL,     POC expires Unit limit Auth Expiration date PT/OT + Visit Limit?   24 BOMN  na         Visit/Unit Tracking  AUTH Status:  Date 12/4/23 12/11/23 12/15/23 12/19/23   Auth after 24 visits Used 1 2 3 4    Remaining   22 21 20      Pertinent Findings:      POC End Date: 23                                                                                          Test / Measure  9/15/2022   FOTO (Predicted 58) 47   L ROT 35   Distal most s/s L hand     Manuals 12/11 12/15 12/19   C/S Ddx TP 5' KS 3' KS 3'   C/S upglides TP 3' KS 3' KS 3'   C/S STM TP 5' KS 5' KS 5'         Neuro Re-Ed      C/S RET 5\"x10 5\"x10 supine 5\"x10 supine   LS S 10\"x10 2x30\" 2x30\"   Upp trap S 10\"x10 2x30\"  2x30\"   Shrugs  3x10 2x15 +ROT   C/S RET+ ext  2x15                Ther Ex      HEP Review      UBE Retro 90 rpm x5' Retro 90 rpm x5' Retro 90 rpm x5'   TB/dana LPD Otb 2x10 Otb 2x10 Gtb 2x10   TB/dana rows Otb 2x10 Otb 2x10 BTB 2x10               Ther Activity                  Gait Training                  Modalities                           "

## 2023-12-21 ENCOUNTER — OFFICE VISIT (OUTPATIENT)
Dept: PHYSICAL THERAPY | Facility: REHABILITATION | Age: 38
End: 2023-12-21
Payer: COMMERCIAL

## 2023-12-21 DIAGNOSIS — R20.0 LEFT ARM NUMBNESS: ICD-10-CM

## 2023-12-21 DIAGNOSIS — M54.2 NECK PAIN ON LEFT SIDE: Primary | ICD-10-CM

## 2023-12-21 DIAGNOSIS — M54.12 CERVICAL RADICULOPATHY: ICD-10-CM

## 2023-12-21 PROCEDURE — 97140 MANUAL THERAPY 1/> REGIONS: CPT

## 2023-12-21 PROCEDURE — 97112 NEUROMUSCULAR REEDUCATION: CPT

## 2023-12-21 PROCEDURE — 97110 THERAPEUTIC EXERCISES: CPT

## 2023-12-21 NOTE — PROGRESS NOTES
"Daily Note     Today's date: 2023  Patient name: Zelda Mayo  : 1985  MRN: [id number]  Referring provider: Mary Silva PA-C  Dx:   Encounter Diagnosis     ICD-10-CM    1. Neck pain on left side  M54.2       2. Cervical radiculopathy  M54.12       3. Left arm numbness  R20.0                      Subjective: Pt. reports no change in status upon arrival. Pt reports pain persists in shoulder, N&T present intermittently down LUE.        Objective: See treatment diary below      Assessment: Tolerated treatment well. Patient would benefit from continued PT.  Pt requires some cuing for form and fabby with exercises.  Pt has difficulty with maintaining shrugs during rotation.  Pt. able to complete all exercises with no increase in pain during or after session.  Pt would benefit from continued PT to address current deficits and improve QOL.  Pt. 1:1 with PTA for entirety.         Plan: Continue per plan of care.      Precautions: ESL,     POC expires Unit limit Auth Expiration date PT/OT + Visit Limit?   24 BOMN  na         Visit/Unit Tracking  AUTH Status:  Date 12/4/23 12/11/23 12/15/23 12/19/23   Auth after 24 visits Used 1 2 3 4    Remaining   21 20      Pertinent Findings:      POC End Date: 23                                                                                          Test / Measure  9/15/2022   FOTO (Predicted 58) 47   L ROT 35   Distal most s/s L hand     Manuals 12/11 12/15 12/19 12/21   C/S Ddx TP 5' KS 3' KS 3' KP 4'   C/S upglides TP 3' KS 3' KS 3'    C/S STM TP 5' KS 5' KS 5' KP 6'          Neuro Re-Ed       C/S RET 5\"x10 5\"x10 supine 5\"x10 supine 2x15 supine   LS S 10\"x10 2x30\" 2x30\" 2x30\"   Upp trap S 10\"x10 2x30\"  2x30\" 2x30\"   Shrugs  3x10 2x15 +ROT 2x15 + ROT   C/S RET+ ext  2x15     DNF    2x10 3\"           Ther Ex       HEP Review       UBE Retro 90 rpm x5' Retro 90 rpm x5' Retro 90 rpm x5' 5' retro   TB/dana KENNEDYD Otb 2x10 Otb 2x10 Gtb 2x10 2x10 gtb "   TB/dana rows Otb 2x10 Otb 2x10 BTB 2x10 2x10 btb                 Ther Activity                     Gait Training                     Modalities                                 Patient Specific Counseling (Will Not Stick From Patient To Patient): AAD Hidradenitis Suppurativa brochure given; recommended Hibiclens wash but warned against use on face or near eyes; discussed laser hair removal- Cosmetic Svcs. HO given for Infinity Detail Level: Simple

## 2023-12-26 ENCOUNTER — OFFICE VISIT (OUTPATIENT)
Dept: PHYSICAL THERAPY | Facility: REHABILITATION | Age: 38
End: 2023-12-26
Payer: COMMERCIAL

## 2023-12-26 DIAGNOSIS — R20.0 LEFT ARM NUMBNESS: ICD-10-CM

## 2023-12-26 DIAGNOSIS — M54.2 NECK PAIN ON LEFT SIDE: Primary | ICD-10-CM

## 2023-12-26 DIAGNOSIS — M54.12 CERVICAL RADICULOPATHY: ICD-10-CM

## 2023-12-26 PROCEDURE — 97140 MANUAL THERAPY 1/> REGIONS: CPT

## 2023-12-26 PROCEDURE — 97110 THERAPEUTIC EXERCISES: CPT

## 2023-12-26 NOTE — PROGRESS NOTES
"Daily Note     Today's date: 2023  Patient name: Zelda Mayo  : 1985  MRN: [id number]  Referring provider: Mary Silva PA-C  Dx:   Encounter Diagnosis     ICD-10-CM    1. Neck pain on left side  M54.2       2. Cervical radiculopathy  M54.12       3. Left arm numbness  R20.0                      Subjective: Pt reported increased pain today; L> R cervical spine into UT.      Objective: See treatment diary below      Assessment: Tolerated treatment well. Patient demonstrated fatigue post treatment and exhibited good technique with therapeutic exercises. VC's needed for correct technique throughout session. Mild restrictions noted with manuals; some relief post.       Plan: Continue per plan of care. 1 on 1 with PTA for 32 mins     Precautions: ESL,     POC expires Unit limit Auth Expiration date PT/OT + Visit Limit?   24 BOMN  na         Visit/Unit Tracking  AUTH Status:  Date 12/4/23 12/11/23 12/15/23 12/19/23 12/21/23 12/26/23   Auth after 24 visits Used 1 2 3 4 5 6    Remaining   21 20 19 18      Pertinent Findings:      POC End Date: 23                                                                                          Test / Measure  9/15/2022   FOTO (Predicted 58) 47   L ROT 35   Distal most s/s L hand     Manuals 12/11 12/15 12/19 12/21 12/26   C/S Ddx TP 5' KS 3' KS 3' KP 4' TC 2'   C/S upglides TP 3' KS 3' KS 3'     C/S STM TP 5' KS 5' KS 5' KP 6' TC 8'           Neuro Re-Ed        C/S RET 5\"x10 5\"x10 supine 5\"x10 supine 2x15 supine 2x15 supine   LS S 10\"x10 2x30\" 2x30\" 2x30\" 2x30\"   Upp trap S 10\"x10 2x30\"  2x30\" 2x30\" 2x30\"   Shrugs  3x10 2x15 +ROT 2x15 + ROT 2x15 + ROT   C/S RET+ ext  2x15      DNF    2x10 3\"  2x10 3\"           Ther Ex        HEP Review        UBE Retro 90 rpm x5' Retro 90 rpm x5' Retro 90 rpm x5' 5' retro 5' retro   TB/dana LPD Otb 2x10 Otb 2x10 Gtb 2x10 2x10 gtb 2x10 gtb   TB/dana rows Otb 2x10 Otb 2x10 BTB 2x10 2x10 btb 2x10 gtb               "     Ther Activity                        Gait Training                        Modalities

## 2023-12-28 ENCOUNTER — APPOINTMENT (OUTPATIENT)
Dept: PHYSICAL THERAPY | Facility: REHABILITATION | Age: 38
End: 2023-12-28
Payer: COMMERCIAL

## 2024-01-02 ENCOUNTER — OFFICE VISIT (OUTPATIENT)
Dept: PHYSICAL THERAPY | Facility: REHABILITATION | Age: 39
End: 2024-01-02
Payer: COMMERCIAL

## 2024-01-02 ENCOUNTER — TELEPHONE (OUTPATIENT)
Dept: PAIN MEDICINE | Facility: CLINIC | Age: 39
End: 2024-01-02

## 2024-01-02 DIAGNOSIS — M54.2 NECK PAIN ON LEFT SIDE: Primary | ICD-10-CM

## 2024-01-02 DIAGNOSIS — M54.12 CERVICAL RADICULOPATHY: ICD-10-CM

## 2024-01-02 DIAGNOSIS — R20.0 LEFT ARM NUMBNESS: ICD-10-CM

## 2024-01-02 PROCEDURE — 97112 NEUROMUSCULAR REEDUCATION: CPT | Performed by: PHYSICAL THERAPIST

## 2024-01-02 PROCEDURE — 97110 THERAPEUTIC EXERCISES: CPT | Performed by: PHYSICAL THERAPIST

## 2024-01-02 PROCEDURE — 97140 MANUAL THERAPY 1/> REGIONS: CPT | Performed by: PHYSICAL THERAPIST

## 2024-01-02 NOTE — PROGRESS NOTES
"Daily Note     Today's date: 2024  Patient name: Zelda Mayo  : 1985  MRN: [id number]  Referring provider: Mary Silva PA-C  Dx:   Encounter Diagnosis     ICD-10-CM    1. Neck pain on left side  M54.2       2. Left arm numbness  R20.0       3. Cervical radiculopathy  M54.12                      Subjective: Reports minimal change in status.    Objective: See treatment diary below    Assessment: Tolerated treatment well. Patient demonstrated fatigue post treatment, exhibited good technique with therapeutic exercises, and would benefit from continued PT. Pt Continues to report pain, but demonstrates improved functional performance and reduced avoidance behaviors.     Plan: Continue per plan of care.      Precautions: ESL,     POC expires Unit limit Auth Expiration date PT/OT + Visit Limit?   24 BOMN  na         Visit/Unit Tracking  AUTH Status:  Date 12/4/23 12/11/23 12/15/23 12/19/23 12/21/23 12/26/23 1/2/24   Auth after 24 visits Used 1 2 3 4 5 6 7    Remaining  23 22 21 20 19 18 17      Pertinent Findings:      POC End Date: 23                                                                                          Test / Measure  9/15/2022   FOTO (Predicted 58) 47   L ROT 35   Distal most s/s L hand     Manuals 12/11 12/15 12/19 12/21 12/26 1/2/24   C/S Ddx TP 5' KS 3' KS 3' KP 4' TC 2' KS 3'   C/S upglides TP 3' KS 3' KS 3'   KS 3'   C/S STM TP 5' KS 5' KS 5' KP 6' TC 8' KS 10'            Neuro Re-Ed         C/S RET 5\"x10 5\"x10 supine 5\"x10 supine 2x15 supine 2x15 supine 2x15 supine   LS S 10\"x10 2x30\" 2x30\" 2x30\" 2x30\" 2x30\"   Upp trap S 10\"x10 2x30\"  2x30\" 2x30\" 2x30\" 2x30\"   Shrugs  3x10 2x15 +ROT 2x15 + ROT 2x15 + ROT 2x15 + ROT   C/S RET+ ext  2x15       DNF    2x10 3\"  2x10 3\"             Ther Ex         HEP Review         UBE Retro 90 rpm x5' Retro 90 rpm x5' Retro 90 rpm x5' 5' retro 5' retro 5' retro   TB/dana LPD Otb 2x10 Otb 2x10 Gtb 2x10 2x10 gtb 2x10 gtb 2x10 12# "   TB/dana rows Otb 2x10 Otb 2x10 BTB 2x10 2x10 btb 2x10 gtb 2x10 17#                     Ther Activity                           Gait Training                           Modalities

## 2024-01-04 ENCOUNTER — OFFICE VISIT (OUTPATIENT)
Dept: PHYSICAL THERAPY | Facility: REHABILITATION | Age: 39
End: 2024-01-04
Payer: COMMERCIAL

## 2024-01-04 DIAGNOSIS — M54.2 NECK PAIN ON LEFT SIDE: Primary | ICD-10-CM

## 2024-01-04 DIAGNOSIS — M54.12 CERVICAL RADICULOPATHY: ICD-10-CM

## 2024-01-04 DIAGNOSIS — R20.0 LEFT ARM NUMBNESS: ICD-10-CM

## 2024-01-04 PROCEDURE — 97112 NEUROMUSCULAR REEDUCATION: CPT | Performed by: PHYSICAL THERAPIST

## 2024-01-04 PROCEDURE — 97110 THERAPEUTIC EXERCISES: CPT | Performed by: PHYSICAL THERAPIST

## 2024-01-04 PROCEDURE — 97140 MANUAL THERAPY 1/> REGIONS: CPT | Performed by: PHYSICAL THERAPIST

## 2024-01-04 NOTE — PROGRESS NOTES
"Daily Note     Today's date: 2024  Patient name: Zelda Mayo  : 1985  MRN: [id number]  Referring provider: Mary Silva PA-C  Dx:   Encounter Diagnosis     ICD-10-CM    1. Neck pain on left side  M54.2       2. Left arm numbness  R20.0       3. Cervical radiculopathy  M54.12                      Subjective: Reports minimal change in s/s. Poor tolerance of sleep.     Objective: See treatment diary below    Assessment: Tolerated treatment well. Patient demonstrated fatigue post treatment, exhibited good technique with therapeutic exercises, and would benefit from continued PT 1:1 with Brayan Rhodes DPT for entirety of tx.     Plan: Continue per plan of care.      Precautions: ESL,     POC expires Unit limit Auth Expiration date PT/OT + Visit Limit?   24 BOMN  na         Visit/Unit Tracking  AUTH Status:  Date 12/4/23 12/11/23 12/15/23 12/19/23 12/21/23 12/26/23 1/2/24 1/4/24   Auth after 24 visits Used 1 2 3 4 5 6 7 8    Remaining  23 22 21 20 19 18 17 16      Pertinent Findings:      POC End Date: 23                                                                                          Test / Measure  9/15/2022   FOTO (Predicted 58) 47   L ROT 35   Distal most s/s L hand     Manuals 12/11 12/15 12/19 12/21 12/26 1/2/24 1/4   C/S Ddx TP 5' KS 3' KS 3' KP 4' TC 2' KS 3' KS 3'   C/S upglides TP 3' KS 3' KS 3'   KS 3' KS 3'   C/S STM TP 5' KS 5' KS 5' KP 6' TC 8' KS 10' KS 10'             Neuro Re-Ed          C/S RET 5\"x10 5\"x10 supine 5\"x10 supine 2x15 supine 2x15 supine 2x15 supine 2x15 supine   LS S 10\"x10 2x30\" 2x30\" 2x30\" 2x30\" 2x30\" 2x30\"   Upp trap S 10\"x10 2x30\"  2x30\" 2x30\" 2x30\" 2x30\" 2x30\"   Shrugs  3x10 2x15 +ROT 2x15 + ROT 2x15 + ROT 2x15 + ROT 2x15 + ROT   C/S RET+ ext  2x15        DNF    2x10 3\"  2x10 3\"               Ther Ex          HEP Review          UBE Retro 90 rpm x5' Retro 90 rpm x5' Retro 90 rpm x5' 5' retro 5' retro 5' retro 5' retro   TB/dana LPD Otb 2x10 " Otb 2x10 Gtb 2x10 2x10 gtb 2x10 gtb 2x10 12# 2x10 12#   TB/dana rows Otb 2x10 Otb 2x10 BTB 2x10 2x10 btb 2x10 gtb 2x10 17# 2x10 17#                       Ther Activity                              Gait Training                              Modalities

## 2024-01-06 DIAGNOSIS — M54.2 NECK PAIN: ICD-10-CM

## 2024-01-09 ENCOUNTER — OFFICE VISIT (OUTPATIENT)
Dept: PHYSICAL THERAPY | Facility: REHABILITATION | Age: 39
End: 2024-01-09
Payer: COMMERCIAL

## 2024-01-09 DIAGNOSIS — M54.2 NECK PAIN ON LEFT SIDE: Primary | ICD-10-CM

## 2024-01-09 DIAGNOSIS — R20.0 LEFT ARM NUMBNESS: ICD-10-CM

## 2024-01-09 DIAGNOSIS — M54.12 CERVICAL RADICULOPATHY: ICD-10-CM

## 2024-01-09 PROCEDURE — 97110 THERAPEUTIC EXERCISES: CPT

## 2024-01-09 PROCEDURE — 97112 NEUROMUSCULAR REEDUCATION: CPT

## 2024-01-09 PROCEDURE — 97140 MANUAL THERAPY 1/> REGIONS: CPT

## 2024-01-09 RX ORDER — CYCLOBENZAPRINE HCL 10 MG
10 TABLET ORAL 2 TIMES DAILY PRN
Qty: 30 TABLET | Refills: 2 | Status: SHIPPED | OUTPATIENT
Start: 2024-01-09

## 2024-01-09 NOTE — PROGRESS NOTES
"Daily Note     Today's date: 2024  Patient name: Zelda Maoy  : 1985  MRN: [id number]  Referring provider: Mary Silva PA-C  Dx:   Encounter Diagnosis     ICD-10-CM    1. Neck pain on left side  M54.2       2. Left arm numbness  R20.0       3. Cervical radiculopathy  M54.12                      Subjective: Pt reported increased pain in neck, UT, and shoulder on L. She also noted increased migraines.       Objective: See treatment diary below      Assessment: Tolerated treatment well. Patient demonstrated fatigue post treatment and exhibited good technique with therapeutic exercises. VC's needed for correct technique throughout session. Moderate restrictions noted with manuals. Some relief noted post session.       Plan: Continue per plan of care.      Precautions: ESL,     POC expires Unit limit Auth Expiration date PT/OT + Visit Limit?   24 BOMN  na         Visit/Unit Tracking  AUTH Status:  Date 12/4/23 12/11/23 12/15/23 12/19/23 12/21/23 12/26/23 1/2/24 1/4/24 1/9/24   Auth after 24 visits Used 1 2 3 4 5 6 7 8 9    Remaining  23 22 21 20 19 18 17 16 15      Pertinent Findings:      POC End Date: 23                                                                                          Test / Measure  9/15/2022   FOTO (Predicted 58) 47   L ROT 35   Distal most s/s L hand     Manuals 12/15 12/19 12/21 12/26 1/2/24 1/4 1/9   C/S Ddx KS 3' KS 3' KP 4' TC 2' KS 3' KS 3' TC 4'   C/S upglides KS 3' KS 3'   KS 3' KS 3'    C/S STM KS 5' KS 5' KP 6' TC 8' KS 10' KS 10' TC 10'             Neuro Re-Ed          C/S RET 5\"x10 supine 5\"x10 supine 2x15 supine 2x15 supine 2x15 supine 2x15 supine 2x15 supine   LS S 2x30\" 2x30\" 2x30\" 2x30\" 2x30\" 2x30\" 2x30\"   Upp trap S 2x30\"  2x30\" 2x30\" 2x30\" 2x30\" 2x30\" 2x30\"   Shrugs 3x10 2x15 +ROT 2x15 + ROT 2x15 + ROT 2x15 + ROT 2x15 + ROT 2x15 +ROT   C/S RET+ ext 2x15         DNF   2x10 3\"  2x10 3\"                Ther Ex          HEP Review          UBE Retro " 90 rpm x5' Retro 90 rpm x5' 5' retro 5' retro 5' retro 5' retro 5' retro   TB/dana LPD Otb 2x10 Gtb 2x10 2x10 gtb 2x10 gtb 2x10 12# 2x10 12# 2x10 12#   TB/dana rows Otb 2x10 BTB 2x10 2x10 btb 2x10 gtb 2x10 17# 2x10 17# 2x10 17#                       Ther Activity                              Gait Training                              Modalities

## 2024-01-11 ENCOUNTER — OFFICE VISIT (OUTPATIENT)
Dept: PHYSICAL THERAPY | Facility: REHABILITATION | Age: 39
End: 2024-01-11
Payer: COMMERCIAL

## 2024-01-11 DIAGNOSIS — M54.12 CERVICAL RADICULOPATHY: ICD-10-CM

## 2024-01-11 DIAGNOSIS — R20.0 LEFT ARM NUMBNESS: ICD-10-CM

## 2024-01-11 DIAGNOSIS — M54.2 NECK PAIN ON LEFT SIDE: Primary | ICD-10-CM

## 2024-01-11 PROCEDURE — 97140 MANUAL THERAPY 1/> REGIONS: CPT

## 2024-01-11 PROCEDURE — 97112 NEUROMUSCULAR REEDUCATION: CPT

## 2024-01-11 PROCEDURE — 97110 THERAPEUTIC EXERCISES: CPT

## 2024-01-11 NOTE — PROGRESS NOTES
"Daily Note     Today's date: 2024  Patient name: Zelda Mayo  : 1985  MRN: [id number]  Referring provider: Mary Silva PA-C  Dx:   Encounter Diagnosis     ICD-10-CM    1. Neck pain on left side  M54.2       2. Left arm numbness  R20.0       3. Cervical radiculopathy  M54.12                      Subjective: Pt reported feeling continued tightness but improved from LV.       Objective: See treatment diary below      Assessment: Tolerated treatment well. Patient demonstrated fatigue post treatment and exhibited good technique with therapeutic exercises. Improved tolerance with exercises today. Moderate restrictions persist but improved from LV. Monitor response NV.       Plan: Continue per plan of care.      Precautions: ESL,     POC expires Unit limit Auth Expiration date PT/OT + Visit Limit?   24 BOMN  na         Visit/Unit Tracking  AUTH Status:  Date 24   Auth after 24 visits Used 1 2 3 4    Remaining  23 22 21 20      Pertinent Findings:      POC End Date: 23                                                                                          Test / Measure  9/15/2022   FOTO (Predicted 58) 47   L ROT 35   Distal most s/s L hand     Manuals 24   C/S Ddx KS 3' KP 4' TC 2' KS 3' KS 3' TC 4' TC 4'   C/S upglides KS 3'   KS 3' KS 3'     C/S STM KS 5' KP 6' TC 8' KS 10' KS 10' TC 10' TC 10'   UT and LS stretch       TC 2'   Neuro Re-Ed          C/S RET 5\"x10 supine 2x15 supine 2x15 supine 2x15 supine 2x15 supine 2x15 supine 2x15 supine   LS S 2x30\" 2x30\" 2x30\" 2x30\" 2x30\" 2x30\" 2x30\"   Upp trap S 2x30\" 2x30\" 2x30\" 2x30\" 2x30\" 2x30\" 2x30\"   Shrugs 2x15 +ROT 2x15 + ROT 2x15 + ROT 2x15 + ROT 2x15 + ROT 2x15 +ROT 2x15 + ROT   C/S RET+ ext          DNF  2x10 3\"  2x10 3\"                 Ther Ex          HEP Review          UBE Retro 90 rpm x5' 5' retro 5' retro 5' retro 5' retro 5' retro 5' retro   JIMMY/dana KENNEDYD Gtb 2x10 2x10 gtb 2x10 " gtb 2x10 12# 2x10 12# 2x10 12# 2x10 12#   TB/dana rows BTB 2x10 2x10 btb 2x10 gtb 2x10 17# 2x10 17# 2x10 17# 2x10 17#                       Ther Activity                              Gait Training                              Modalities

## 2024-01-12 ENCOUNTER — OFFICE VISIT (OUTPATIENT)
Dept: INTERNAL MEDICINE CLINIC | Facility: CLINIC | Age: 39
End: 2024-01-12

## 2024-01-12 VITALS
DIASTOLIC BLOOD PRESSURE: 70 MMHG | HEIGHT: 63 IN | HEART RATE: 84 BPM | OXYGEN SATURATION: 98 % | BODY MASS INDEX: 27.71 KG/M2 | WEIGHT: 156.4 LBS | RESPIRATION RATE: 16 BRPM | SYSTOLIC BLOOD PRESSURE: 103 MMHG

## 2024-01-12 DIAGNOSIS — R73.03 PREDIABETES: ICD-10-CM

## 2024-01-12 DIAGNOSIS — Z12.4 CERVICAL CANCER SCREENING: ICD-10-CM

## 2024-01-12 DIAGNOSIS — M54.2 NECK PAIN ON LEFT SIDE: Chronic | ICD-10-CM

## 2024-01-12 DIAGNOSIS — G43.809 OTHER MIGRAINE WITHOUT STATUS MIGRAINOSUS, NOT INTRACTABLE: ICD-10-CM

## 2024-01-12 DIAGNOSIS — F17.200 TOBACCO DEPENDENCE: ICD-10-CM

## 2024-01-12 DIAGNOSIS — Z00.00 ANNUAL PHYSICAL EXAM: Primary | ICD-10-CM

## 2024-01-12 DIAGNOSIS — A04.8 H. PYLORI INFECTION: ICD-10-CM

## 2024-01-12 DIAGNOSIS — J45.40 MODERATE PERSISTENT ASTHMA WITHOUT COMPLICATION: ICD-10-CM

## 2024-01-12 PROBLEM — G43.909 MIGRAINE: Status: ACTIVE | Noted: 2024-01-12

## 2024-01-12 PROCEDURE — 99395 PREV VISIT EST AGE 18-39: CPT | Performed by: PHYSICIAN ASSISTANT

## 2024-01-12 NOTE — PROGRESS NOTES
ADULT ANNUAL PHYSICAL  [facility] [place]    NAME: Zelda Mayo  AGE: 38 y.o. SEX: female  : 1985     DATE: 1/15/2024     Assessment and Plan:     Problem List Items Addressed This Visit          Respiratory    Moderate persistent asthma without complication     Improved. Continue Advair 100-50 mcg/ACT BID. Rinse mouth after use. Continue albuterol as needed.             Cardiovascular and Mediastinum    Migraine     She has not been taking her propranolol. States it did not help so she stopped. She declines starting other preventative medication at this time. Agreeable to referral to neurology for further evaluation and management.          Relevant Orders    Ambulatory Referral to Neurology       Other    Neck pain on left side (Chronic)     Likely cervical radiculopathy. Will evaluate further with cervical spine MRI. She has tried medications, home exercises, and formal physical therapy for 6 weeks with no improvement. Continue tylenol as needed and Flexeril as needed. Continue stretching throughout the day and use good body mechanics.           Relevant Orders    MRI cervical spine wo contrast    Tobacco dependence     Encouraged smoking cessation.          Prediabetes     Will recheck A1c. Reviewed nutrition and exercise recommendations.         Relevant Orders    Hemoglobin A1C    H. pylori infection     Completed course of antibiotics. Will recheck studies for clearance of infection. Stop PPI for at least 7 days prior to sample. Patient understood.          Relevant Orders    H. pylori antigen, stool    Annual physical exam - Primary     Discussed general health recommendations and screening guidelines. Due for cervical cancer screening. Agreeable to referral. Up to date on screening lab work. Up to date on immunizations. Recommend routine dental and eye exams. Next physical one year.           Other Visit Diagnoses       Cervical cancer  screening        Relevant Orders    Ambulatory Referral to Obstetrics / Gynecology    BMI 27.0-27.9,adult                Immunizations and preventive care screenings were discussed with patient today. Appropriate education was printed on patient's after visit summary.    Counseling:  Alcohol/drug use: discussed moderation in alcohol intake, the recommendations for healthy alcohol use, and avoidance of illicit drug use.  Dental Health: discussed importance of regular tooth brushing, flossing, and dental visits.  Injury prevention: discussed safety/seat belts, safety helmets, smoke detectors, carbon dioxide detectors, and smoking near bedding or upholstery.  Sexual health: discussed sexually transmitted diseases, partner selection, use of condoms, avoidance of unintended pregnancy, and contraceptive alternatives.  Exercise: the importance of regular exercise/physical activity was discussed. Recommend exercise 3-5 times per week for at least 30 minutes.     BMI Counseling: Body mass index is 27.71 kg/m². The BMI is above normal. Nutrition recommendations include decreasing portion sizes, encouraging healthy choices of fruits and vegetables, decreasing fast food intake, consuming healthier snacks, limiting drinks that contain sugar, moderation in carbohydrate intake, increasing intake of lean protein, reducing intake of saturated and trans fat and reducing intake of cholesterol. Exercise recommendations include moderate physical activity 150 minutes/week, exercising 3-5 times per week and strength training exercises. No pharmacotherapy was ordered. Rationale for BMI follow-up plan is due to patient being overweight or obese.     Tobacco Cessation Counseling: Tobacco cessation counseling was provided. The patient is sincerely urged to quit consumption of tobacco. She is not ready to quit tobacco. Medication options and side effects of medication discussed. Patient refused medication.         Return in about 3 months  (around 4/12/2024) for Recheck migraines, abdominal pain - 40 mins.     Chief Complaint:     Chief Complaint   Patient presents with    Physical Exam      History of Present Illness:     Adult Annual Physical   Patient here for a comprehensive physical exam. The patient reports problems - stomach pain .  Finished her treatment for H. Pylori several weeks ago, reports that she still has intermittent abdominal pain, epigastric. Nothing makes it better or worse. Unaffected by eating or drinking. Burning sharp pain. No radiation. No n/v/d. Lasts minutes to hours. She is taking omeprazole once daily. She states she took the antibiotics as directed.   Also states she is still having frequent migraines, almost daily. She is not currently taking her propranolol. States it was not helping.   Still has left sided neck and arm pain accompanied with numbness and tingling. She has been trying PT for 6 weeks and taking medication. States it has helped a little, but not much.     Diet and Physical Activity  Diet/Nutrition: well balanced diet and limited junk food.   Exercise: no formal exercise.        General Health  Sleep: sleeps well and gets 7-8 hours of sleep on average.   Hearing: normal - bilateral.  Vision: no vision problems and most recent eye exam >1 year ago.   Dental: regular dental visits and brushes teeth twice daily.       /GYN Health  Follows with gynecology? no   Last menstrual period: 12/25/23  Contraceptive method: barrier methods.  History of STDs?: no.     Advanced Care Planning  Do you have an advanced directive? no  Do you have a durable medical power of ? no     Review of Systems:     Review of Systems   Constitutional:  Positive for appetite change (decreased). Negative for chills, diaphoresis, fatigue, fever and unexpected weight change.   HENT:  Negative for congestion, hearing loss, postnasal drip, rhinorrhea, sinus pressure, sinus pain, sore throat, tinnitus and trouble swallowing.    Eyes:   Negative for visual disturbance.   Respiratory:  Negative for cough, chest tightness, shortness of breath and wheezing.    Cardiovascular:  Negative for chest pain, palpitations and leg swelling.   Gastrointestinal:  Negative for abdominal distention, abdominal pain, blood in stool, constipation, diarrhea, nausea and vomiting.   Endocrine: Negative for cold intolerance, heat intolerance, polydipsia, polyphagia and polyuria.   Genitourinary:  Negative for decreased urine volume, difficulty urinating, dysuria, flank pain, frequency, hematuria, pelvic pain, urgency, vaginal bleeding, vaginal discharge and vaginal pain.   Musculoskeletal:  Positive for arthralgias, myalgias, neck pain and neck stiffness. Negative for back pain, gait problem and joint swelling.   Skin:  Negative for rash.   Neurological:  Positive for dizziness, light-headedness, numbness and headaches. Negative for tremors, syncope and weakness.   Hematological:  Negative for adenopathy.   Psychiatric/Behavioral:  Positive for dysphoric mood and sleep disturbance. Negative for self-injury and suicidal ideas. The patient is nervous/anxious.       Past Medical History:     Past Medical History:   Diagnosis Date    Asthma     Chlamydia infection     Depression     IBS (irritable bowel syndrome)     Migraine     Varicose veins of both lower extremities       Past Surgical History:     Past Surgical History:   Procedure Laterality Date    COLPOSCOPY        Social History:     Social History     Socioeconomic History    Marital status: Single     Spouse name: None    Number of children: None    Years of education: None    Highest education level: None   Occupational History    None   Tobacco Use    Smoking status: Every Day     Current packs/day: 1.00     Average packs/day: 1 pack/day for 15.0 years (15.0 ttl pk-yrs)     Types: Cigarettes    Smokeless tobacco: Never   Vaping Use    Vaping status: Never Used   Substance and Sexual Activity    Alcohol use: Yes      Comment: social    Drug use: Never    Sexual activity: Yes     Partners: Male     Birth control/protection: None     Comment: trying to have a baby   Other Topics Concern    None   Social History Narrative    None     Social Determinants of Health     Financial Resource Strain: Low Risk  (11/9/2023)    Overall Financial Resource Strain (CARDIA)     Difficulty of Paying Living Expenses: Not hard at all   Food Insecurity: No Food Insecurity (11/9/2023)    Hunger Vital Sign     Worried About Running Out of Food in the Last Year: Never true     Ran Out of Food in the Last Year: Never true   Transportation Needs: No Transportation Needs (11/9/2023)    PRAPARE - Transportation     Lack of Transportation (Medical): No     Lack of Transportation (Non-Medical): No   Physical Activity: Not on file   Stress: Not on file   Social Connections: Not on file   Intimate Partner Violence: Not on file   Housing Stability: Not on file      Family History:     Family History   Problem Relation Age of Onset    Diabetes Brother     No Known Problems Mother     Cirrhosis Father     No Known Problems Sister     Autism Son     No Known Problems Maternal Grandmother     No Known Problems Maternal Grandfather     No Known Problems Paternal Grandmother     No Known Problems Paternal Grandfather     No Known Problems Brother     Depression Son     No Known Problems Cousin     Colon cancer Maternal Uncle       Current Medications:     Current Outpatient Medications   Medication Sig Dispense Refill    albuterol (PROVENTIL HFA,VENTOLIN HFA) 90 mcg/act inhaler INHALE 2 PUFFS EVERY 6 (SIX) HOURS AS NEEDED FOR WHEEZING 18 g 3    clotrimazole (LOTRIMIN) 1 % cream Apply topically 2 (two) times a day 45 g 1    cyclobenzaprine (FLEXERIL) 10 mg tablet TAKE 1 TABLET (10 MG TOTAL) BY MOUTH 2 (TWO) TIMES A DAY AS NEEDED FOR MUSCLE SPASMS 30 tablet 2    dicyclomine (BENTYL) 10 mg capsule TAKE 2 CAPSULES BY MOUTH 3 (THREE) TIMES A DAY AS NEEDED FOR (NAUSEA,  "ABDOMINAL PAIN) 90 capsule 0    fluticasone (FLONASE) 50 mcg/act nasal spray 2 sprays into each nostril continuous as needed for rhinitis 2 Bottle 0    Fluticasone-Salmeterol (Advair Diskus) 100-50 mcg/dose inhaler Inhale 1 puff 2 (two) times a day Rinse mouth after use. 60 blister 3    gabapentin (NEURONTIN) 400 mg capsule FERN ELIZABETH CAPSULA POR VIA ORAL ROBEL VECES AL ROGERIO      mirtazapine (REMERON) 15 mg tablet FERN 1 TABLETA POR VIA ORAL DIARIAMENTE ANTES DE DORMIR EN LA NOCHE      montelukast (SINGULAIR) 10 mg tablet TAKE 1 TABLET POR VIA ORAL DIARIAMENTE 180 tablet 0    omeprazole (PriLOSEC) 20 mg delayed release capsule Take 1 capsule (20 mg total) by mouth daily 30 capsule 2    PARoxetine (PAXIL) 40 MG tablet FERN 1 TABLETA POR VIA ORAL CADA MA?GOSIA      propranolol (INDERAL LA) 60 mg 24 hr capsule TAKE 1 CAPSULE (60 MG TOTAL) BY MOUTH DAILY 90 capsule 3    QUEtiapine (SEROquel) 100 mg tablet FERN 1 TABLETA POR VIA ORAL CADA MA?GOSIA      QUEtiapine (SEROquel) 300 mg tablet FERN 1 TABLETA POR VIA ORAL ANTES DE DORMIR EN LA NOCHE      vitamin B-12 (CYANOCOBALAMIN) 250 MCG TABS Take 1 tablet (250 mcg total) by mouth daily 90 tablet 1     No current facility-administered medications for this visit.      Allergies:     No Known Allergies   Physical Exam:     /70 (BP Location: Left arm, Patient Position: Sitting, Cuff Size: Standard)   Pulse 84   Resp 16   Ht 5' 3\" (1.6 m)   Wt 70.9 kg (156 lb 6.4 oz)   LMP 12/25/2023 (Exact Date)   SpO2 98%   BMI 27.71 kg/m²     Physical Exam  Vitals and nursing note reviewed.   Constitutional:       General: She is awake. She is not in acute distress.     Appearance: Normal appearance. She is well-developed, well-groomed and overweight. She is not ill-appearing.   HENT:      Head: Normocephalic and atraumatic.      Right Ear: Tympanic membrane, ear canal and external ear normal.      Left Ear: Tympanic membrane, ear canal and external ear normal.      Nose: Nose " normal.      Mouth/Throat:      Lips: Pink.      Mouth: Mucous membranes are moist.      Pharynx: Oropharynx is clear. Uvula midline. No oropharyngeal exudate or posterior oropharyngeal erythema.   Eyes:      General: No scleral icterus.     Extraocular Movements: Extraocular movements intact.      Right eye: Normal extraocular motion and no nystagmus.      Left eye: Normal extraocular motion and no nystagmus.      Conjunctiva/sclera: Conjunctivae normal.      Pupils: Pupils are equal, round, and reactive to light.   Neck:      Vascular: No carotid bruit, hepatojugular reflux or JVD.   Cardiovascular:      Rate and Rhythm: Normal rate and regular rhythm.      Pulses: Normal pulses.           Radial pulses are 2+ on the right side and 2+ on the left side.      Heart sounds: Normal heart sounds. No murmur heard.  Pulmonary:      Effort: Pulmonary effort is normal. No respiratory distress.      Breath sounds: Normal breath sounds and air entry. No decreased air movement. No decreased breath sounds, wheezing, rhonchi or rales.   Abdominal:      General: Bowel sounds are normal. There is no distension.      Palpations: Abdomen is soft. There is no mass.      Tenderness: There is no abdominal tenderness. There is no right CVA tenderness, left CVA tenderness, guarding or rebound.      Hernia: No hernia is present.   Musculoskeletal:         General: Normal range of motion.      Cervical back: Neck supple. Tenderness present. No swelling, edema, deformity, erythema, signs of trauma, lacerations, rigidity, spasms, torticollis, bony tenderness or crepitus. Pain with movement and muscular tenderness present. No spinous process tenderness. Normal range of motion.      Right lower leg: No edema.      Left lower leg: No edema.   Lymphadenopathy:      Cervical: No cervical adenopathy.   Skin:     General: Skin is warm.      Capillary Refill: Capillary refill takes less than 2 seconds.      Coloration: Skin is not jaundiced.       Findings: No rash.   Neurological:      General: No focal deficit present.      Mental Status: She is alert and oriented to person, place, and time. Mental status is at baseline.      Cranial Nerves: Cranial nerves 2-12 are intact.      Sensory: Sensation is intact.      Motor: Motor function is intact.      Coordination: Coordination is intact.      Gait: Gait is intact.      Comments: Upper and lower extremity strength 5/5 equal and symmetric. Normal tone. Normal sensation.   Psychiatric:         Attention and Perception: Attention normal.         Mood and Affect: Mood and affect normal.         Speech: Speech normal.         Behavior: Behavior normal. Behavior is cooperative.          Mary Silva PA-C   [facility]

## 2024-01-12 NOTE — PATIENT INSTRUCTIONS

## 2024-01-15 PROBLEM — J45.40 MODERATE PERSISTENT ASTHMA WITHOUT COMPLICATION: Status: ACTIVE | Noted: 2019-01-03

## 2024-01-15 NOTE — ASSESSMENT & PLAN NOTE
Discussed general health recommendations and screening guidelines. Due for cervical cancer screening. Agreeable to referral. Up to date on screening lab work. Up to date on immunizations. Recommend routine dental and eye exams. Next physical one year.

## 2024-01-15 NOTE — ASSESSMENT & PLAN NOTE
Likely cervical radiculopathy. Will evaluate further with cervical spine MRI. She has tried medications, home exercises, and formal physical therapy for 6 weeks with no improvement. Continue tylenol as needed and Flexeril as needed. Continue stretching throughout the day and use good body mechanics.

## 2024-01-15 NOTE — ASSESSMENT & PLAN NOTE
Completed course of antibiotics. Will recheck studies for clearance of infection. Stop PPI for at least 7 days prior to sample. Patient understood.

## 2024-01-15 NOTE — ASSESSMENT & PLAN NOTE
She has not been taking her propranolol. States it did not help so she stopped. She declines starting other preventative medication at this time. Agreeable to referral to neurology for further evaluation and management.

## 2024-01-15 NOTE — ASSESSMENT & PLAN NOTE
Improved. Continue Advair 100-50 mcg/ACT BID. Rinse mouth after use. Continue albuterol as needed.

## 2024-01-16 ENCOUNTER — APPOINTMENT (OUTPATIENT)
Dept: PHYSICAL THERAPY | Facility: REHABILITATION | Age: 39
End: 2024-01-16
Payer: COMMERCIAL

## 2024-01-18 ENCOUNTER — OFFICE VISIT (OUTPATIENT)
Dept: PHYSICAL THERAPY | Facility: REHABILITATION | Age: 39
End: 2024-01-18
Payer: COMMERCIAL

## 2024-01-18 DIAGNOSIS — M54.2 NECK PAIN ON LEFT SIDE: Primary | ICD-10-CM

## 2024-01-18 DIAGNOSIS — R20.0 LEFT ARM NUMBNESS: ICD-10-CM

## 2024-01-18 DIAGNOSIS — M54.12 CERVICAL RADICULOPATHY: ICD-10-CM

## 2024-01-18 PROCEDURE — 97140 MANUAL THERAPY 1/> REGIONS: CPT | Performed by: PHYSICAL THERAPIST

## 2024-01-18 PROCEDURE — 97110 THERAPEUTIC EXERCISES: CPT | Performed by: PHYSICAL THERAPIST

## 2024-01-18 PROCEDURE — 97112 NEUROMUSCULAR REEDUCATION: CPT | Performed by: PHYSICAL THERAPIST

## 2024-01-18 NOTE — PROGRESS NOTES
"Daily Note     Today's date: 2024  Patient name: Zelda Mayo  : 1985  MRN: [id number]  Referring provider: Mary Silva PA-C  Dx:   Encounter Diagnosis     ICD-10-CM    1. Neck pain on left side  M54.2       2. Left arm numbness  R20.0       3. Cervical radiculopathy  M54.12                      Subjective: Reports minimal change in status.     Objective: See treatment diary below    Assessment: Tolerated treatment well. Patient would benefit from continued PT 1:1 with Brayan Rhodes DPT for entirety of tx. Pt demonstrates minimal change in self reported functional status via FOTO assessment. Does demonstrate improved ROM and distal most s/s.     Plan: Continue per plan of care.      Precautions: ESL,     POC expires Unit limit Auth Expiration date PT/OT + Visit Limit?   24 BOMN  na         Visit/Unit Tracking  AUTH Status:  Date 24   Auth after 24 visits Used 1 2 3 4 5    Remaining  23 22 21 20 21      Pertinent Findings:      POC End Date: 23                                                                                          Test / Measure  9/15/2022 1/18   FOTO (Predicted 58) 47 45   L ROT 35 65   Distal most s/s L hand L Elbow      Manuals 24   C/S Ddx KS 3' KP 4' TC 2' KS 3' KS 3' TC 4' TC 4' KS 3'   C/S upglides KS 3'   KS 3' KS 3'   KS 3'   C/S STM KS 5' KP 6' TC 8' KS 10' KS 10' TC 10' TC 10' KS 10'   UT and LS stretch       TC 2'    Neuro Re-Ed           C/S RET 5\"x10 supine 2x15 supine 2x15 supine 2x15 supine 2x15 supine 2x15 supine 2x15 supine 2x15 supine   LS S 2x30\" 2x30\" 2x30\" 2x30\" 2x30\" 2x30\" 2x30\" 2x30\"   Upp trap S 2x30\" 2x30\" 2x30\" 2x30\" 2x30\" 2x30\" 2x30\" 2x30\"   Shrugs 2x15 +ROT 2x15 + ROT 2x15 + ROT 2x15 + ROT 2x15 + ROT 2x15 +ROT 2x15 + ROT 2x15 + ROT 8#   C/S RET+ ext        2x10   DNF  2x10 3\"  2x10 3\"                   Ther Ex           HEP Review           UBE Retro 90 rpm x5' 5' retro " 5' retro 5' retro 5' retro 5' retro 5' retro 5' retro   TB/dana LPD Gtb 2x10 2x10 gtb 2x10 gtb 2x10 12# 2x10 12# 2x10 12# 2x10 12# 2x10 12#   TB/dana rows BTB 2x10 2x10 btb 2x10 gtb 2x10 17# 2x10 17# 2x10 17# 2x10 17# 2x10 17#                         Ther Activity                                 Gait Training                                 Modalities

## 2024-01-19 ENCOUNTER — HOSPITAL ENCOUNTER (OUTPATIENT)
Dept: RADIOLOGY | Facility: HOSPITAL | Age: 39
Discharge: HOME | End: 2024-01-19
Payer: COMMERCIAL

## 2024-01-19 ENCOUNTER — TELEPHONE (OUTPATIENT)
Dept: DENTISTRY | Facility: CLINIC | Age: 39
End: 2024-01-19

## 2024-01-19 DIAGNOSIS — M54.2 NECK PAIN ON LEFT SIDE: Chronic | ICD-10-CM

## 2024-01-19 PROCEDURE — 72141 MRI NECK SPINE W/O DYE: CPT

## 2024-01-22 ENCOUNTER — TELEPHONE (OUTPATIENT)
Dept: NEUROLOGY | Facility: CLINIC | Age: 39
End: 2024-01-22

## 2024-01-22 ENCOUNTER — APPOINTMENT (OUTPATIENT)
Dept: LAB | Facility: CLINIC | Age: 39
End: 2024-01-22

## 2024-01-23 ENCOUNTER — APPOINTMENT (OUTPATIENT)
Dept: PHYSICAL THERAPY | Facility: REHABILITATION | Age: 39
End: 2024-01-23
Payer: COMMERCIAL

## 2024-01-24 DIAGNOSIS — M54.2 NECK PAIN ON LEFT SIDE: Chronic | ICD-10-CM

## 2024-01-24 DIAGNOSIS — M48.02 CERVICAL STENOSIS OF SPINAL CANAL: Primary | ICD-10-CM

## 2024-01-25 ENCOUNTER — OFFICE VISIT (OUTPATIENT)
Dept: PHYSICAL THERAPY | Facility: REHABILITATION | Age: 39
End: 2024-01-25
Payer: COMMERCIAL

## 2024-01-25 DIAGNOSIS — M54.2 NECK PAIN ON LEFT SIDE: Primary | ICD-10-CM

## 2024-01-25 DIAGNOSIS — M54.12 CERVICAL RADICULOPATHY: ICD-10-CM

## 2024-01-25 PROCEDURE — 97140 MANUAL THERAPY 1/> REGIONS: CPT | Performed by: PHYSICAL THERAPIST

## 2024-01-25 PROCEDURE — 97110 THERAPEUTIC EXERCISES: CPT | Performed by: PHYSICAL THERAPIST

## 2024-01-25 PROCEDURE — 97112 NEUROMUSCULAR REEDUCATION: CPT | Performed by: PHYSICAL THERAPIST

## 2024-01-25 NOTE — PROGRESS NOTES
"Daily Note     Today's date: 2024  Patient name: Zelda Mayo  : 1985  MRN: [id number]  Referring provider: Mary Silva PA-C  Dx:   Encounter Diagnosis     ICD-10-CM    1. Neck pain on left side  M54.2       2. Cervical radiculopathy  M54.12           Start Time: 1530  Stop Time: 1625  Total time in clinic (min): 55 minutes    Subjective: Continue to report only short term relief in s/s.     Objective: See treatment diary below    Assessment: Tolerated treatment well. Patient would benefit from continued PT 1:1 with Brayan Rhodes DPT for entirety of tx. Pt reports reduced pain with SH tx today and demonstrates improved SH ROM and reduced pain post sh manual tx.     Plan: Continue per plan of care.  Emphasize secondary shoulder impairments.      Precautions: ESL,     POC expires Unit limit Auth Expiration date PT/OT + Visit Limit?   24 BOMN  na         Visit/Unit Tracking  AUTH Status:  Date 24   Auth after 24 visits Used 1 2 3 4 5 6    Remaining  23 22 21 20 21 22      Pertinent Findings:      POC End Date: 23                                                                                          Test / Measure  9/15/2022 1/18   FOTO (Predicted 58) 47 45   L ROT 35 65   Distal most s/s L hand L Elbow      Manuals 24   C/S Ddx KS 3' KP 4' TC 2' KS 3' KS 3' TC 4' TC 4' KS 3' KS 3'   C/S upglides KS 3'   KS 3' KS 3'   KS 3' KS 3'   C/S STM KS 5' KP 6' TC 8' KS 10' KS 10' TC 10' TC 10' KS 10' KS 8'   UT and LS stretch       TC 2'     SH PROM         KS 8'   GHJ Inf glides            Neuro Re-Ed            C/S RET 5\"x10 supine 2x15 supine 2x15 supine 2x15 supine 2x15 supine 2x15 supine 2x15 supine 2x15 supine 2x15 supine   LS S 2x30\" 2x30\" 2x30\" 2x30\" 2x30\" 2x30\" 2x30\" 2x30\" 2x30\"   Upp trap S 2x30\" 2x30\" 2x30\" 2x30\" 2x30\" 2x30\" 2x30\" 2x30\" 2x30\"   Shrugs 2x15 +ROT 2x15 + ROT 2x15 + ROT 2x15 + ROT 2x15 + ROT " "2x15 +ROT 2x15 + ROT 2x15 + ROT 8# 2x15 + ROT 8#   C/S RET+ ext        2x10 2x10   DNF  2x10 3\"  2x10 3\"         [employer]         2x15 windowsill                                       Ther Ex            HEP Review            UBE Retro 90 rpm x5' 5' retro 5' retro 5' retro 5' retro 5' retro 5' retro 5' retro 5' retro   TB/dana LPD Gtb 2x10 2x10 gtb 2x10 gtb 2x10 12# 2x10 12# 2x10 12# 2x10 12# 2x10 12# 2x10 12#   TB/dana rows BTB 2x10 2x10 btb 2x10 gtb 2x10 17# 2x10 17# 2x10 17# 2x10 17# 2x10 17# 2x10 17#   [employer] [facility]         3x10 3#               Ther Activity                                    Gait Training                                    Modalities                                                             "

## 2024-01-26 ENCOUNTER — APPOINTMENT (OUTPATIENT)
Dept: LAB | Facility: CLINIC | Age: 39
End: 2024-01-26
Payer: COMMERCIAL

## 2024-01-26 DIAGNOSIS — R73.03 PREDIABETES: ICD-10-CM

## 2024-01-26 DIAGNOSIS — A04.8 H. PYLORI INFECTION: ICD-10-CM

## 2024-01-26 LAB
EST. AVERAGE GLUCOSE BLD GHB EST-MCNC: 137 MG/DL
HBA1C MFR BLD: 6.4 %

## 2024-01-26 PROCEDURE — 87338 HPYLORI STOOL AG IA: CPT

## 2024-01-26 PROCEDURE — 36415 COLL VENOUS BLD VENIPUNCTURE: CPT

## 2024-01-26 PROCEDURE — 83036 HEMOGLOBIN GLYCOSYLATED A1C: CPT

## 2024-01-27 LAB — H PYLORI AG STL QL IA: POSITIVE

## 2024-01-29 DIAGNOSIS — A04.8 H. PYLORI INFECTION: Primary | ICD-10-CM

## 2024-01-29 RX ORDER — OMEPRAZOLE 20 MG/1
20 CAPSULE, DELAYED RELEASE ORAL 2 TIMES DAILY
Qty: 28 CAPSULE | Refills: 0 | Status: SHIPPED | OUTPATIENT
Start: 2024-01-29 | End: 2024-02-12

## 2024-01-29 RX ORDER — LEVOFLOXACIN 500 MG/1
500 TABLET, FILM COATED ORAL EVERY 24 HOURS
Qty: 14 TABLET | Refills: 0 | Status: SHIPPED | OUTPATIENT
Start: 2024-01-29 | End: 2024-02-12

## 2024-01-29 RX ORDER — AMOXICILLIN 500 MG/1
1000 CAPSULE ORAL EVERY 12 HOURS SCHEDULED
Qty: 56 CAPSULE | Refills: 0 | Status: SHIPPED | OUTPATIENT
Start: 2024-01-29 | End: 2024-02-12

## 2024-01-30 ENCOUNTER — CONSULT (OUTPATIENT)
Dept: NEUROLOGY | Facility: CLINIC | Age: 39
End: 2024-01-30
Payer: COMMERCIAL

## 2024-01-30 ENCOUNTER — OFFICE VISIT (OUTPATIENT)
Dept: PHYSICAL THERAPY | Facility: REHABILITATION | Age: 39
End: 2024-01-30
Payer: COMMERCIAL

## 2024-01-30 VITALS
TEMPERATURE: 97.7 F | OXYGEN SATURATION: 98 % | WEIGHT: 159.2 LBS | HEART RATE: 87 BPM | BODY MASS INDEX: 28.2 KG/M2 | SYSTOLIC BLOOD PRESSURE: 110 MMHG | DIASTOLIC BLOOD PRESSURE: 72 MMHG

## 2024-01-30 DIAGNOSIS — R29.810 FACIAL DROOP: ICD-10-CM

## 2024-01-30 DIAGNOSIS — R20.0 LEFT ARM NUMBNESS: ICD-10-CM

## 2024-01-30 DIAGNOSIS — M54.12 CERVICAL RADICULOPATHY: ICD-10-CM

## 2024-01-30 DIAGNOSIS — G43.E09 CHRONIC MIGRAINE WITH AURA WITHOUT STATUS MIGRAINOSUS, NOT INTRACTABLE: Primary | ICD-10-CM

## 2024-01-30 DIAGNOSIS — M54.2 NECK PAIN ON LEFT SIDE: Primary | ICD-10-CM

## 2024-01-30 PROCEDURE — 99245 OFF/OP CONSLTJ NEW/EST HI 55: CPT

## 2024-01-30 PROCEDURE — 97112 NEUROMUSCULAR REEDUCATION: CPT

## 2024-01-30 PROCEDURE — 97110 THERAPEUTIC EXERCISES: CPT

## 2024-01-30 PROCEDURE — 97140 MANUAL THERAPY 1/> REGIONS: CPT

## 2024-01-30 RX ORDER — RIZATRIPTAN BENZOATE 10 MG/1
10 TABLET ORAL AS NEEDED
Qty: 10 TABLET | Refills: 3 | Status: SHIPPED | OUTPATIENT
Start: 2024-01-30

## 2024-01-30 RX ORDER — TOPIRAMATE 25 MG/1
50 TABLET ORAL
Qty: 60 TABLET | Refills: 3 | Status: SHIPPED | OUTPATIENT
Start: 2024-01-30

## 2024-01-30 NOTE — PATIENT INSTRUCTIONS
Patient Instructions:    Additional Testing:   Neurodiagnostic workup: MRI brain without contrast ordered    Headache Calendar  Please maintain a headache calendar  Consider using phone applications such as Migraine Vinny or Migraine Diary    Headache/migraine treatment:     Rescue medications (for immediate treatment of a headache):   It is ok to take ibuprofen, acetaminophen or naproxen (Advil, Tylenol,  Aleve, Excedrin) if they help your headaches you should limit these to No more than 3 times a week to avoid medication overuse/rebound headaches.     For your more moderate to severe migraines take this medication early   Maxalt (rizatriptan) 10mg tabs - take one at the onset of headache. May repeat one time after 2 hours if pain has not resolved.   (Max 2 a day and 10 a month)     Prescription preventive medications for headaches/migraines   (to take every day to help prevent headaches - not to take at the time of headache):  - Start Topamax 25 mg, take 1 tablet by mouth once daily at bedtime for the first week.  After the first week, she can increase to 2 tablets by mouth once daily at bedtime afterwards.  She should continue this for the next 6 to 8 weeks and reach out if she has any improvement in regard to her migraine headaches.    *Typically these types of medications take time until you see the benefit, although some may see improvement in days, often it may take weeks, especially if the medication is being titrated up to a beneficial level. Please contact us if there are any concerns or questions regarding the medication.     Over the counter preventive supplements for headaches/migraines (if you try, try for 3 months straight)  (to take every day to help prevent headaches - not to take at the time of headache):  There are combo pills online of these - none of which regulated by FDA and double check dosing - take appropriate dose only once a day- prevent a migraine, migravent, mind ease, migrelief   []  Magnesium 400mg daily (If any diarrhea or upset stomach, decrease dose  as tolerated)  [] Riboflavin (Vitamin B2) 400mg daily (may make your urine bright/neon yellow)    Lifestyle Recommendations:  [x] SLEEP - Maintain a regular sleep schedule: Adults need at least 7-8 hours of uninterrupted a night. Maintain good sleep hygiene:  Going to bed and waking up at consistent times, avoiding excessive daytime naps, avoiding caffeinated beverages in the evening, avoid excessive stimulation in the evening and generally using bed primarily for sleeping.  One hour before bedtime would recommend turning lights down lower, decreasing your activity (may read quietly, listen to music at a low volume). When you get into bed, should eliminate all technology (no texting, emailing, playing with your phone, iPad or tablet in bed).  [x] HYDRATION - Maintain good hydration.  Drink  2L of fluid a day (4 typical small water bottles)  [x] DIET - Maintain good nutrition. In particular don't skip meals and try and eat healthy balanced meals regularly.  [x] TRIGGERS - Look for other triggers and avoid them: Limit caffeine to 1-2 cups a day or less. Avoid dietary triggers that you have noticed bring on your headaches (this could include aged cheese, peanuts, MSG, aspartame and nitrates).  [x] EXERCISE - physical exercise as we all know is good for you in many ways, and not only is good for your heart, but also is beneficial for your mental health, cognitive health and  chronic pain/headaches. I would encourage at the least 5 days of physical exercise weekly for at least 30 minutes.     Education and Follow-up  [x] Please call with any questions or concerns. Of course if any new concerning symptoms go to the emergency department.  [x] Follow up in 4 months with Mitch SINHA

## 2024-01-30 NOTE — PROGRESS NOTES
[facility] Consult  PATIENT:  Zelda Mayo  MRN:  [id number]  :  1985  DATE OF SERVICE:  2024  REFERRED BY: Mary Silva PA-C  PMD: Mary Silva PA-C    Assessment/Plan:     Zelda Mayo is a very pleasant 38 y.o. female with a past medical history that includes moderate persistent asthma, tinea corporis, cervical radiculopathy, prediabetes, anxiety, depression, H. pylori infection, migraine referred here for evaluation of headache.    Initial evaluation 2024    Chronic migraine with aura and without status migrainosus:    I had the pleasure of seeing Zelda today in the office at [facility] in [place].  She is presenting for an initial new patient consultation in regard to her headaches.  The patient states that her headaches started at the age of 14 to 15 years old, but noted that they are much more frequent now than they were previously in the past.  She notes that she is having them almost every day at this point in time.  Patient noted that she currently takes ibuprofen at this time and Fioricet in the past which did not seem to help her headaches.  She notes that the ibuprofen does make her pain slightly better.  She notes that along with these headaches, she will have bright flashing lights in her vision before the headache started.  She notes that the pain is usually at the front of the head, back of the head, and on both sides of the head and is described as a pressure type pain.  The patient has associated nausea, vomiting, photophobia, phonophobia, osmophobia, blurred vision, lightheadedness/dizziness, facial numbness, and paresthesias with these headaches.  She notes that bending over will make the headaches worse.  She notes that she has never had any previous neuroimaging at this time.  She was noted to have tried propranolol long-acting 60 mg in the past which did not seem to be helpful for her.  The  patient is also currently taking gabapentin 400 mg twice daily, Paxil, Seroquel, Remeron, and cyclobenzaprine for daily medications.    Based on my evaluation, it does appear that the patient is suffering from chronic migraines with aura.  Based on the fact that the patient has never had an MRI brain without contrast previously, felt as though it was a good idea that we obtain one.  She has been struggling with the headaches for many years at this point but has never had any neuroimaging and since she is a new headache patient and may be best to get 1 to fully evaluate and make sure there is now underlying etiology of the patient's headaches.  In regard to a preventative medication, we had selected to try the patient on Topamax for migraine prevention.  Wanted her to keep an eye on the side effects of Topamax and also be aware of changes to her mood especially with taking other mood stabilizing medications at the same time.  For abortive therapy, had provided the patient with a prescription of Maxalt to take in cases of the severe or very debilitating headaches.    To be duly noted, on neurologic examination did notice that the patient was having some slight right facial droop/right asymmetric smile.  When looking at the rest of the neurologic examination, the patient may have had asymmetric eyebrow wrinkling on the right side but it was difficult to discern this and completely tell.  The patient had also noted again that she was having the numbness of the right and left lips intermittently without the headaches.  In regard to strength testing, appeared that the patient had 5/5 strength of the bilateral upper and lower extremities.  Patient had stated that intermittently she would have numbness of the left upper extremity but not the right upper extremity.  It was noted that the patient was also slightly hyperreflexive on the entire right side of the body compared to the left side of the body.  With this being said,  hard to discern if the patient may have suffered from a possible stroke or if the patient was possibly suffering from something like Bell's palsy.  The patient reported that the symptoms were not new, and had at least been going on for about 2 months at this point in time.  She stated that she personally had not been able to discern or tell that she was having facial droop per herself.  For this reason, do believe that it was even more essential that we obtain an MRI of the brain without contrast, and the order was switched to be performed more urgently but the patient certainly did not need a stat evaluation or to be sent to the hospital for further evaluation currently.  Especially due to the fact that the patient reported that the symptoms had been ongoing for about 2 months at this time.  If the patient does not have a stroke and is still having difficulty with the facial droop then we may need to look into other etiologies again such as Bell's palsy or another cause of unilateral facial droop.      Patient Instructions:    Additional Testing:   Neurodiagnostic workup: MRI brain without contrast ordered    Headache Calendar  Please maintain a headache calendar  Consider using phone applications such as Migraine Vinny or Migraine Diary    Headache/migraine treatment:     Rescue medications (for immediate treatment of a headache):   It is ok to take ibuprofen, acetaminophen or naproxen (Advil, Tylenol,  Aleve, Excedrin) if they help your headaches you should limit these to No more than 3 times a week to avoid medication overuse/rebound headaches.     For your more moderate to severe migraines take this medication early   Maxalt (rizatriptan) 10mg tabs - take one at the onset of headache. May repeat one time after 2 hours if pain has not resolved.   (Max 2 a day and 10 a month)     Prescription preventive medications for headaches/migraines   (to take every day to help prevent headaches - not to take at the time of  headache):  - Start Topamax 25 mg, take 1 tablet by mouth once daily at bedtime for the first week.  After the first week, she can increase to 2 tablets by mouth once daily at bedtime afterwards.  She should continue this for the next 6 to 8 weeks and reach out if she has any improvement in regard to her migraine headaches.    *Typically these types of medications take time until you see the benefit, although some may see improvement in days, often it may take weeks, especially if the medication is being titrated up to a beneficial level. Please contact us if there are any concerns or questions regarding the medication.     Over the counter preventive supplements for headaches/migraines (if you try, try for 3 months straight)  (to take every day to help prevent headaches - not to take at the time of headache):  There are combo pills online of these - none of which regulated by FDA and double check dosing - take appropriate dose only once a day- prevent a migraine, migravent, mind ease, migrelief   [] Magnesium 400mg daily (If any diarrhea or upset stomach, decrease dose  as tolerated)  [] Riboflavin (Vitamin B2) 400mg daily (may make your urine bright/neon yellow)    Lifestyle Recommendations:  [x] SLEEP - Maintain a regular sleep schedule: Adults need at least 7-8 hours of uninterrupted a night. Maintain good sleep hygiene:  Going to bed and waking up at consistent times, avoiding excessive daytime naps, avoiding caffeinated beverages in the evening, avoid excessive stimulation in the evening and generally using bed primarily for sleeping.  One hour before bedtime would recommend turning lights down lower, decreasing your activity (may read quietly, listen to music at a low volume). When you get into bed, should eliminate all technology (no texting, emailing, playing with your phone, iPad or tablet in bed).  [x] HYDRATION - Maintain good hydration.  Drink  2L of fluid a day (4 typical small water bottles)  [x] DIET  - Maintain good nutrition. In particular don't skip meals and try and eat healthy balanced meals regularly.  [x] TRIGGERS - Look for other triggers and avoid them: Limit caffeine to 1-2 cups a day or less. Avoid dietary triggers that you have noticed bring on your headaches (this could include aged cheese, peanuts, MSG, aspartame and nitrates).  [x] EXERCISE - physical exercise as we all know is good for you in many ways, and not only is good for your heart, but also is beneficial for your mental health, cognitive health and  chronic pain/headaches. I would encourage at the least 5 days of physical exercise weekly for at least 30 minutes.     Education and Follow-up  [x] Please call with any questions or concerns. Of course if any new concerning symptoms go to the emergency department.  [x] Follow up in 4 months with Mitch ROBERTS:   We had the pleasure of evaluating Zelda Mayo in neurological consultation today. Zelda Mayo is a  38 y.o. female who presents today for evaluation of headaches.     History obtained from patient as well as available medical record review.  History of Present Illness:   Current medical illnesses  or past medical history include moderate persistent asthma, tinea corporis, cervical radiculopathy, prediabetes, anxiety, depression, H. pylori infection, migraine      Interval History:    , Tamiko, was used for the entirety of this visit to be able to translate Malay to English and vice versa to allow effective communication and history taking by myself and the patient.    In regards to the facial droop, patient stated that she had not ever noticed this herself and had not appreciated it.  She did note that the numbness of the lips and of the lower half of the face would be consistent regardless of the headaches.  She stated that this had been going on for about 2 months time and again cannot discern exactly when the facial droop had necessarily started but did  not seem entirely too concerned of this.  Hard to discern if the patient had intact right forehead/eyebrow wrinkling and if it were symmetric to the left side.  The patient stated that she did not feel she was experiencing any other strokelike symptoms at this time and felt that her strength was symmetrical.  She did note some numbness intermittently of the left upper extremity but none of the right upper extremity.    Headaches started at what age?  15-15 years old, much more frequent now than previously when she started having them  How often do the headaches occur?   - as of 1/30/2024: almost every day   What time of the day do the headaches start?  No particular time of day   How long do the headaches last? Taking ibuprofen at this time, Fioricet in the past but did not seem to help her. Does make the pain slightly better   Are you ever headache free? No     Aura? with aura, bright flashing lights before the headache starts.      Where is your headache located and pain quality? Front of the head, back of the head, and both sides of the head. Pressure   What is the intensity of pain? Worst 10/10, Average: 8/10  Associated symptoms:   [x] Nausea       [x] Vomiting (sometimes)  [x] Stiff or sore neck   [x] Problems with concentration  [x] Photophobia     [x]Phonophobia      [x] Osmophobia  [x] Blurred vision (no loss of vision, but does have blurry vision with the headaches)  [x] Prefer quiet, dark room  [x] Light-headed or dizzy     [x] Tinnitus (sometimes)  [x] Hands or feet tingle or feel numb/paresthesias (numbness in lips and numbness in the arms, swollen/purple lips)  [x] Facial numbness with headaches  [x] Lacrimation       Things that make the headache worse? Bending over makes it worse     Any positional change headaches? No positional change headaches     Headache triggers:  Anxiety, lack of sleep     Have you seen someone else for headaches or pain? No, just her PCP  Have you had trigger point injection  performed and how often? No  Have you had Botox injection performed and how often? No   Have you had epidural injections or transforaminal injections performed? No  Are you current pregnant or planning on getting pregnant? No, not sexually active she lives alone right now.   Have you ever had any Brain imaging? no    Last eye exam: recently had an eye exam and the patient states that she does most likely need new glasses.     What medications do you take or have you taken for your headaches?   ABORTIVE:    OTC medications: Ibuprofen  Prescription: None    Past/ failed/contraindicated:  OTC medications: Naproxen   Prescription: Fioricet (did not work)    PREVENTIVE:   Gabapentin 400 mg BID, Paxil, Seroquel, Remeron, Cyclobenzaprine     Past/ failed/contraindicated:  Propanolol LA 60 mg (not helpful), No TCAs/SNRIs for headaches due to taking Paxil       LIFESTYLE  Sleep   - averages: 8 hours of sleep at night she states  Problems falling asleep?:   Yes, does need help with medication to fall asleep  Problems staying asleep?:  No    - No issues with snoring or sleep apnea     Physical activity: does stay active throughout the week     Water: 3-4, 8 ounce glasses of water a day   Caffeine: only about once a month     Mood: Anxiety/depression at this time, does have some months worse than other with anxiety     The following portions of the patient's history were reviewed and updated as appropriate: allergies, current medications, past family history, past medical history, past social history, past surgical history and problem list.    Pertinent family history:  Family history of headaches:  migraine headaches in sister and brother  Any family history of aneurysms - No    Pertinent social history:  Work: not currently working   Education: high school   Lives with her son     Illicit Drugs: denies  Alcohol/tobacco: alcohol intake: social drinker, Tobacco use: Smoked 1 packs per day for 15 years    Past Medical History:      Past Medical History:   Diagnosis Date    Asthma     Chlamydia infection     Depression     IBS (irritable bowel syndrome)     Migraine     Varicose veins of both lower extremities        Patient Active Problem List   Diagnosis    Moderate persistent asthma without complication    Anxiety    Depression    Epigastric pain    Neck pain on left side    Tobacco dependence    Left arm numbness    Dizziness    Tinea corporis    Skin lesion    Prediabetes    Migraine    H. pylori infection    Annual physical exam       Medications:      Current Outpatient Medications   Medication Sig Dispense Refill    albuterol (PROVENTIL HFA,VENTOLIN HFA) 90 mcg/act inhaler INHALE 2 PUFFS EVERY 6 (SIX) HOURS AS NEEDED FOR WHEEZING 18 g 3    amoxicillin (AMOXIL) 500 mg capsule Take 2 capsules (1,000 mg total) by mouth every 12 (twelve) hours for 14 days 56 capsule 0    clotrimazole (LOTRIMIN) 1 % cream Apply topically 2 (two) times a day 45 g 1    cyclobenzaprine (FLEXERIL) 10 mg tablet TAKE 1 TABLET (10 MG TOTAL) BY MOUTH 2 (TWO) TIMES A DAY AS NEEDED FOR MUSCLE SPASMS 30 tablet 2    dicyclomine (BENTYL) 10 mg capsule TAKE 2 CAPSULES BY MOUTH 3 (THREE) TIMES A DAY AS NEEDED FOR (NAUSEA, ABDOMINAL PAIN) 90 capsule 0    fluticasone (FLONASE) 50 mcg/act nasal spray 2 sprays into each nostril continuous as needed for rhinitis 2 Bottle 0    Fluticasone-Salmeterol (Advair Diskus) 100-50 mcg/dose inhaler Inhale 1 puff 2 (two) times a day Rinse mouth after use. 60 blister 3    gabapentin (NEURONTIN) 400 mg capsule FERN ELIZABETH CAPSULA POR VIA ORAL ROBEL VECES AL ROGERIO      levofloxacin (LEVAQUIN) 500 mg tablet Take 1 tablet (500 mg total) by mouth every 24 hours for 14 days 14 tablet 0    mirtazapine (REMERON) 15 mg tablet FERN 1 TABLETA POR VIA ORAL DIARIAMENTE ANTES DE DORMIR EN LA NOCHE      montelukast (SINGULAIR) 10 mg tablet TAKE 1 TABLET POR VIA ORAL DIARIAMENTE 180 tablet 0    omeprazole (PriLOSEC) 20 mg delayed release capsule Take 1  capsule (20 mg total) by mouth daily 30 capsule 2    omeprazole (PriLOSEC) 20 mg delayed release capsule Take 1 capsule (20 mg total) by mouth 2 (two) times a day for 14 days 28 capsule 0    PARoxetine (PAXIL) 40 MG tablet FERN 1 TABLETA POR VIA ORAL CADA MA?GOSIA      propranolol (INDERAL LA) 60 mg 24 hr capsule TAKE 1 CAPSULE (60 MG TOTAL) BY MOUTH DAILY 90 capsule 3    QUEtiapine (SEROquel) 100 mg tablet FERN 1 TABLETA POR VIA ORAL CADA MA?GOSIA      QUEtiapine (SEROquel) 300 mg tablet FERN 1 TABLETA POR VIA ORAL ANTES DE DORMIR EN LA NOCHE      vitamin B-12 (CYANOCOBALAMIN) 250 MCG TABS Take 1 tablet (250 mcg total) by mouth daily 90 tablet 1     No current facility-administered medications for this visit.        Allergies:    No Known Allergies    Family History:     Family History   Problem Relation Age of Onset    Diabetes Brother     No Known Problems Mother     Cirrhosis Father     No Known Problems Sister     Autism Son     No Known Problems Maternal Grandmother     No Known Problems Maternal Grandfather     No Known Problems Paternal Grandmother     No Known Problems Paternal Grandfather     No Known Problems Brother     Depression Son     No Known Problems Cousin     Colon cancer Maternal Uncle        Social History:       Social History     Socioeconomic History    Marital status: Single     Spouse name: Not on file    Number of children: Not on file    Years of education: Not on file    Highest education level: Not on file   Occupational History    Not on file   Tobacco Use    Smoking status: Every Day     Current packs/day: 1.00     Average packs/day: 1 pack/day for 15.0 years (15.0 ttl pk-yrs)     Types: Cigarettes    Smokeless tobacco: Never   Vaping Use    Vaping status: Never Used   Substance and Sexual Activity    Alcohol use: Yes     Comment: social    Drug use: Never    Sexual activity: Yes     Partners: Male     Birth control/protection: None     Comment: trying to have a baby   Other Topics  Concern    Not on file   Social History Narrative    Not on file     Social Determinants of Health     Financial Resource Strain: Low Risk  (11/9/2023)    Overall Financial Resource Strain (CARDIA)     Difficulty of Paying Living Expenses: Not hard at all   Food Insecurity: No Food Insecurity (11/9/2023)    Hunger Vital Sign     Worried About Running Out of Food in the Last Year: Never true     Ran Out of Food in the Last Year: Never true   Transportation Needs: No Transportation Needs (11/9/2023)    PRAPARE - Transportation     Lack of Transportation (Medical): No     Lack of Transportation (Non-Medical): No   Physical Activity: Not on file   Stress: Not on file   Social Connections: Not on file   Intimate Partner Violence: Not on file   Housing Stability: Not on file         Objective:     Physical Exam:                                                                 Vitals:            Constitutional:    LMP 12/25/2023 (Exact Date)   BP Readings from Last 3 Encounters:   01/12/24 103/70   11/20/23 107/74   11/09/23 109/76     Pulse Readings from Last 3 Encounters:   01/12/24 84   11/20/23 79   11/09/23 88         Well developed, well nourished, well groomed. No dysmorphic features.       Psychiatric:  Normal behavior and appropriate affect        Neurological Examination:     Mental status/cognitive function:   Orientated to time, place and person. Recent and remote memory intact. Attention span and concentration as well as fund of knowledge are appropriate for age. Normal language and spontaneous speech.    Cranial Nerves:  II-visual fields full.   III, IV, VI-Pupils were equal, round, and reactive to light and accomodation. Extraocular movements were full and conjugate without nystagmus. Conjugate gaze, normal smooth pursuits, normal saccades   V-facial sensation symmetric.  Numbness to light touch sensation of the bilateral V3 distribution  VII-facial expression symmetric, intact to left forehead wrinkle,  partially intact right forehead wrinkle, asymmetric smile (right facial droop)  VIII-hearing grossly intact bilaterally   IX, X-palate elevation symmetric, no dysarthria.   XI-shoulder shrug strength intact    XII-tongue protrusion midline.    Motor Exam: symmetric bulk and tone throughout, no pronator drift. Power/strength 5/5 bilateral upper and lower extremities, no atrophy, fasciculations or abnormal movements noted.   Sensory: grossly intact light touch in all extremities.   Reflexes:   Left: brachioradialis 2+, biceps 2+, knee 2+  Right: Brachioradialis 3+, biceps 3+, knee 3+  Coordination: Finger nose finger intact bilaterally, no apparent dysmetria, ataxia or tremor noted  Gait: steady casual and tandem gait.      Pertinent lab results:     Iron panel, 11/09/2023: Iron saturation decreased to 6%, iron decreased to 23 ug/dL    Vitamin B12: 288 pg/mL    Pertinent Imaging:     MRI cervical spine wo contrast, 01/19/2023:    IMPRESSION:     Multilevel degenerative changes of cervical spine with varying degrees of canal stenosis (mild C5-C6 and C6-C7) and foraminal narrowing (moderate-to-severe left C6-C7), as detailed above.      Review of Systems:     Review of Systems   Constitutional:  Negative for appetite change, fatigue and fever.   HENT: Negative.  Negative for hearing loss, tinnitus, trouble swallowing and voice change.    Eyes:  Positive for visual disturbance. Negative for photophobia and pain.   Respiratory: Negative.  Negative for shortness of breath.    Cardiovascular: Negative.  Negative for palpitations.   Gastrointestinal:  Positive for nausea. Negative for vomiting.   Endocrine: Negative.  Negative for cold intolerance.   Genitourinary: Negative.  Negative for dysuria, frequency and urgency.   Musculoskeletal:  Negative for back pain, gait problem, myalgias, neck pain and neck stiffness.   Skin: Negative.  Negative for rash.   Allergic/Immunologic: Negative.    Neurological:  Positive for  dizziness and headaches. Negative for tremors, seizures, syncope, facial asymmetry, speech difficulty, weakness, light-headedness and numbness.   Hematological: Negative.  Does not bruise/bleed easily.   Psychiatric/Behavioral: Negative.  Negative for confusion, hallucinations and sleep disturbance.    All other systems reviewed and are negative.       I have spent 75 minutes with the patient today in which greater than 50% of this time was spent in counseling/coordination of care regarding Risks and benefits of tx options, Instructions for management, Patient and family education, Importance of tx compliance, Risk factor reductions, Impressions, Counseling / Coordination of care, Documenting in the medical record, Reviewing / ordering tests, medicine, procedures  , and Obtaining or reviewing history  . I also spent 20 minutes non face to face for this patient the same day.     Activity Minutes   Precharting/reviewing 10   Patient care/counseling 75   Postcharting/care coordination 10       Author:  Dionicio Britt PA-C 1/30/2024 12:14 PM

## 2024-01-30 NOTE — PROGRESS NOTES
Review of Systems   Constitutional:  Negative for appetite change, fatigue and fever.   HENT: Negative.  Negative for hearing loss, tinnitus, trouble swallowing and voice change.    Eyes:  Positive for visual disturbance. Negative for photophobia and pain.   Respiratory: Negative.  Negative for shortness of breath.    Cardiovascular: Negative.  Negative for palpitations.   Gastrointestinal:  Positive for nausea. Negative for vomiting.   Endocrine: Negative.  Negative for cold intolerance.   Genitourinary: Negative.  Negative for dysuria, frequency and urgency.   Musculoskeletal:  Negative for back pain, gait problem, myalgias, neck pain and neck stiffness.   Skin: Negative.  Negative for rash.   Allergic/Immunologic: Negative.    Neurological:  Positive for dizziness and headaches. Negative for tremors, seizures, syncope, facial asymmetry, speech difficulty, weakness, light-headedness and numbness.   Hematological: Negative.  Does not bruise/bleed easily.   Psychiatric/Behavioral: Negative.  Negative for confusion, hallucinations and sleep disturbance.    All other systems reviewed and are negative.

## 2024-02-01 ENCOUNTER — OFFICE VISIT (OUTPATIENT)
Dept: PHYSICAL THERAPY | Facility: REHABILITATION | Age: 39
End: 2024-02-01
Payer: COMMERCIAL

## 2024-02-01 ENCOUNTER — TELEPHONE (OUTPATIENT)
Dept: INTERNAL MEDICINE CLINIC | Facility: CLINIC | Age: 39
End: 2024-02-01

## 2024-02-01 DIAGNOSIS — M54.2 NECK PAIN ON LEFT SIDE: Primary | ICD-10-CM

## 2024-02-01 DIAGNOSIS — M54.12 CERVICAL RADICULOPATHY: ICD-10-CM

## 2024-02-01 DIAGNOSIS — R20.0 LEFT ARM NUMBNESS: ICD-10-CM

## 2024-02-01 PROCEDURE — 97110 THERAPEUTIC EXERCISES: CPT | Performed by: PHYSICAL THERAPIST

## 2024-02-01 PROCEDURE — 97140 MANUAL THERAPY 1/> REGIONS: CPT | Performed by: PHYSICAL THERAPIST

## 2024-02-01 PROCEDURE — 97112 NEUROMUSCULAR REEDUCATION: CPT | Performed by: PHYSICAL THERAPIST

## 2024-02-01 NOTE — PROGRESS NOTES
"Daily Note     Today's date: 2024  Patient name: Zelda Mayo  : 1985  MRN: [id number]  Referring provider: Mary Silva PA-C  Dx:   Encounter Diagnosis     ICD-10-CM    1. Neck pain on left side  M54.2       2. Cervical radiculopathy  M54.12       3. Left arm numbness  R20.0                      Subjective: Decreased pain and improved ROM since last visit.     Objective: See treatment diary below    Assessment: Tolerated treatment well. Patient demonstrated fatigue post treatment, exhibited good technique with therapeutic exercises, and would benefit from continued PT 1:1 with Brayan Rhodes DPT for entirety of tx. Added walls slide to HEP for OH activity improvement.       Plan: Continue per plan of care.      Precautions: ESL,     POC expires Unit limit Auth Expiration date PT/OT + Visit Limit?   24 BOMN  na         Visit/Unit Tracking  AUTH Status:  Date 24   Auth after 24 visits Used 1 2 3 4 5 6 7 8    Remaining  23 22 21 20 19 18 17 16      Pertinent Findings:      POC End Date: 23                                                                                          Test / Measure  9/15/2022 1/18   FOTO (Predicted 58) 47 45   L ROT 35 65   Distal most s/s L hand L Elbow          RE NV   Manuals    C/S Ddx KS 3' KS 3' TC 3'    C/S upglides KS 3' KS 3'     C/S STM KS 10' KS 8' TC 8'    UT and LS stretch       SH PROM  KS 8' TC 8' KS 8'   GHJ Inf glides    KS   Neuro Re-Ed       C/S RET 2x15 supine 2x15 supine 2x15 supine    LS S 2x30\" 2x30\" 2x30\"    Upp trap S 2x30\" 2x30\" 2x30\"    Shrugs 2x15 + ROT 8# 2x15 + ROT 8# 2x15 + ROT 8# 2x15 + ROT 8#   C/S RET+ ext 2x10 2x10 2x10    DNF       Cane [place] [employer]  2x15 windowsill 2x15 windowsill 2x15 windowsil   Supine scap punches   3\"x10    Wall slides              Ther Ex       HEP Review       UBE 5' retro 5' retro 5' retro Pulley 8'   JIMMY/daan KENNEDYD 2x10 12# 2x10 12# 12# 2x10 " 3x10 mtb   TB/dana rows 2x10 17# 2x10 17# 17# 2x10 3x10 mtb   [place] ER  3x10 3# 3# 3x10 3x10 3# SLER   Cane AAROM Flexion    2x20   Ther Activity                     Gait Training                     Modalities

## 2024-02-01 NOTE — TELEPHONE ENCOUNTER
Chronic migraine with aura without status migrainosus, not intractable [G43.E09]    MRI brain scheduled for 2/7/24     Patient needs medication since she is claustrophobic    Please call the patient to advise medication was sent to [facility].    thanks

## 2024-02-05 DIAGNOSIS — F41.9 ANXIETY: Primary | ICD-10-CM

## 2024-02-05 RX ORDER — DIAZEPAM 2 MG/1
TABLET ORAL
Qty: 1 TABLET | Refills: 0 | Status: SHIPPED | OUTPATIENT
Start: 2024-02-05

## 2024-02-06 ENCOUNTER — APPOINTMENT (OUTPATIENT)
Dept: PHYSICAL THERAPY | Facility: REHABILITATION | Age: 39
End: 2024-02-06
Payer: COMMERCIAL

## 2024-02-07 ENCOUNTER — HOSPITAL ENCOUNTER (OUTPATIENT)
Dept: RADIOLOGY | Facility: HOSPITAL | Age: 39
Discharge: HOME | End: 2024-02-07
Payer: COMMERCIAL

## 2024-02-07 DIAGNOSIS — G43.E09 CHRONIC MIGRAINE WITH AURA WITHOUT STATUS MIGRAINOSUS, NOT INTRACTABLE: ICD-10-CM

## 2024-02-07 PROCEDURE — 70551 MRI BRAIN STEM W/O DYE: CPT

## 2024-02-07 PROCEDURE — G1004 CDSM NDSC: HCPCS

## 2024-02-08 ENCOUNTER — APPOINTMENT (OUTPATIENT)
Dept: PHYSICAL THERAPY | Facility: REHABILITATION | Age: 39
End: 2024-02-08
Payer: COMMERCIAL

## 2024-02-13 ENCOUNTER — APPOINTMENT (OUTPATIENT)
Dept: PHYSICAL THERAPY | Facility: REHABILITATION | Age: 39
End: 2024-02-13
Payer: COMMERCIAL

## 2024-02-14 DIAGNOSIS — A04.8 H. PYLORI INFECTION: ICD-10-CM

## 2024-02-14 DIAGNOSIS — M54.2 NECK PAIN: ICD-10-CM

## 2024-02-15 ENCOUNTER — APPOINTMENT (OUTPATIENT)
Dept: PHYSICAL THERAPY | Facility: REHABILITATION | Age: 39
End: 2024-02-15
Payer: COMMERCIAL

## 2024-02-15 RX ORDER — CYCLOBENZAPRINE HCL 10 MG
10 TABLET ORAL 2 TIMES DAILY PRN
Qty: 30 TABLET | Refills: 2 | Status: SHIPPED | OUTPATIENT
Start: 2024-02-15

## 2024-02-15 RX ORDER — OMEPRAZOLE 20 MG/1
20 CAPSULE, DELAYED RELEASE ORAL 2 TIMES DAILY
Qty: 28 CAPSULE | Refills: 0 | OUTPATIENT
Start: 2024-02-15 | End: 2024-02-29

## 2024-02-16 ENCOUNTER — CONSULT (OUTPATIENT)
Dept: NEUROSURGERY | Facility: CLINIC | Age: 39
End: 2024-02-16
Payer: COMMERCIAL

## 2024-02-16 VITALS
RESPIRATION RATE: 16 BRPM | OXYGEN SATURATION: 99 % | WEIGHT: 153 LBS | HEIGHT: 63 IN | BODY MASS INDEX: 27.11 KG/M2 | HEART RATE: 88 BPM | DIASTOLIC BLOOD PRESSURE: 76 MMHG | TEMPERATURE: 98.6 F | SYSTOLIC BLOOD PRESSURE: 110 MMHG

## 2024-02-16 DIAGNOSIS — M48.02 CERVICAL STENOSIS OF SPINAL CANAL: ICD-10-CM

## 2024-02-16 DIAGNOSIS — M54.2 NECK PAIN ON LEFT SIDE: Chronic | ICD-10-CM

## 2024-02-16 PROCEDURE — 99244 OFF/OP CNSLTJ NEW/EST MOD 40: CPT | Performed by: PHYSICIAN ASSISTANT

## 2024-02-16 RX ORDER — OMEGA-3 FATTY ACIDS/FISH OIL 300-1000MG
400 CAPSULE ORAL EVERY 6 HOURS PRN
COMMUNITY

## 2024-02-16 NOTE — PROGRESS NOTES
Neurosurgery Office Note  Zelda Mayo 38 y.o. female MRN: [id number]      Assessment/Plan     Neck pain on left side  Pt presents for consultation for neck pain which mainly radiates to LUE with associated numbness and weakness x 1 year.     Imaging reviewed personally. Final results below discussed with the patient.   MRI Cervical spine 1/19/24: Multilevel degenerative changes of cervical spine with varying degrees of canal stenosis (mild C5-C6 and C6-C7) and foraminal narrowing (moderate-to-severe left C6-C7).      Plan  Pain control with OTC and prescribed medications as needed. Recommend rescheduling consult with pain mgt for further evaluation and pain mgt.   Recommend pt continue PT as tolerated.   At this time, recommend pt trial conservative measures with pain mgt and PT as tolerated. If sx persist or worsen despite conservative measures for about 6 weeks, patient to follow-up with neurosurgery with flexion-extension x-rays of the cervical spine for further evaluation.  Discussed plan of care with patient who showed understanding.   Patient made aware to contact neurosurgery with any questions or concerns.       Diagnoses and all orders for this visit:    Cervical stenosis of spinal canal  -     Ambulatory Referral to Neurosurgery  -     XR spine cervical complete 6+ vw flex/ext/obl; Future    Neck pain on left side  -     Ambulatory Referral to Neurosurgery    Other orders  -     Ibuprofen 200 MG CAPS; Take 400 mg by mouth every 6 (six) hours as needed          I have spent a total time of  45 minutes on 02/16/24 in caring for this patient including Diagnostic results, Risks and benefits of tx options, Instructions for management, Patient and family education, Importance of tx compliance, Risk factor reductions, Impressions, Counseling / Coordination of care, Documenting in the medical record, Reviewing / ordering tests, medicine, procedures  , Obtaining or reviewing history  , and Communicating with  other healthcare professionals .      CHIEF COMPLAINT    Chief Complaint   Patient presents with    Consult     Cervical spine evaluation w/ MRI and Xrays       HISTORY    History of Present Illness     38 y.o. year old female     With PMHx of Migraine, Asthma, anxiety, depression, dizziness who presents was referred by PCP for neurosurgery consultation for neck pain. Pt reports she has had neck pain x 2 years and radicular pain primarily in LUE over the last year. Today patient rates her neck pain as 8/10. Radiates to BUE left > right laterally to all her fingers. Reports left > right UE weakness and numbness. She reports her LUE feels heavy and at times she cannot lift it above shoulder level. Reports daily HA in the occipital region. Denies dropping things. Denies trouble walking, no bowel or bladder issues.     Reports she is continuing PT at this time. She reports she was scheduled to see pain mgt but canceled due to many appointments about the same time and yet to rescheduled apt. She denies any recent MELINDA.   Reports flexeril is not helping much. She also take Ibupfrofen PRN.   Pt accompanied by her son to this visit.       REVIEW OF SYSTEMS    Review of Systems   HENT:  Negative for trouble swallowing.    Respiratory:  Positive for chest tightness and shortness of breath (Walking).    Gastrointestinal: Negative.    Genitourinary: Negative.         Denies BBI   Musculoskeletal:  Positive for neck pain (Radiating into shoulders and down into hands bilaterally L>R). Negative for gait problem.        Physical therapy helps but pain returns (left arm)   Skin:  Negative for wound.   Neurological:  Positive for weakness (Left arm feels heavy), numbness (Left arm (whole arm into five fingers) numbness/tingling) and headaches (Every day, primarily occipital). Negative for seizures.   Psychiatric/Behavioral:  Positive for sleep disturbance (Secondary to pain).        ROS obtained by MA. Reviewed. See HPI.      Meds/Allergies     Current Outpatient Medications   Medication Sig Dispense Refill    albuterol (PROVENTIL HFA,VENTOLIN HFA) 90 mcg/act inhaler INHALE 2 PUFFS EVERY 6 (SIX) HOURS AS NEEDED FOR WHEEZING 18 g 3    clotrimazole (LOTRIMIN) 1 % cream Apply topically 2 (two) times a day 45 g 1    cyclobenzaprine (FLEXERIL) 10 mg tablet TAKE 1 TABLET (10 MG TOTAL) BY MOUTH 2 (TWO) TIMES A DAY AS NEEDED FOR MUSCLE SPASMS 30 tablet 2    dicyclomine (BENTYL) 10 mg capsule TAKE 2 CAPSULES BY MOUTH 3 (THREE) TIMES A DAY AS NEEDED FOR (NAUSEA, ABDOMINAL PAIN) 90 capsule 0    fluticasone (FLONASE) 50 mcg/act nasal spray 2 sprays into each nostril continuous as needed for rhinitis 2 Bottle 0    Fluticasone-Salmeterol (Advair Diskus) 100-50 mcg/dose inhaler Inhale 1 puff 2 (two) times a day Rinse mouth after use. 60 blister 3    gabapentin (NEURONTIN) 400 mg capsule FERN ELIZABETH CAPSULA POR VIA ORAL ROBEL VECES AL ROGERIO      Ibuprofen 200 MG CAPS Take 400 mg by mouth every 6 (six) hours as needed      omeprazole (PriLOSEC) 20 mg delayed release capsule Take 1 capsule (20 mg total) by mouth daily 30 capsule 2    PARoxetine (PAXIL) 40 MG tablet FERN 1 TABLETA POR VIA ORAL CADA MA?GOSIA      QUEtiapine (SEROquel) 100 mg tablet FERN 1 TABLETA POR VIA ORAL CADA MA?GOSIA      QUEtiapine (SEROquel) 300 mg tablet FERN 1 TABLETA POR VIA ORAL ANTES DE DORMIR EN LA NOCHE      rizatriptan (Maxalt) 10 mg tablet Take 1 tablet (10 mg total) by mouth as needed for migraine Take at the onset of migraine; if symptoms continue or return, may take another dose at least 2 hours after first dose. Take no more than 2 doses in a day. 10 tablet 3    topiramate (Topamax) 25 mg tablet Take 2 tablets (50 mg total) by mouth daily at bedtime 60 tablet 3    vitamin B-12 (CYANOCOBALAMIN) 250 MCG TABS Take 1 tablet (250 mcg total) by mouth daily 90 tablet 1    diazepam (VALIUM) 2 mg tablet Take one tablet 15 minutes prior to MRI (Patient not taking: Reported on  "2/16/2024) 1 tablet 0    mirtazapine (REMERON) 15 mg tablet FERN 1 TABLETA POR VIA ORAL DIARIAMENTE ANTES DE DORMIR EN LA NOCHE (Patient not taking: Reported on 2/16/2024)      montelukast (SINGULAIR) 10 mg tablet TAKE 1 TABLET POR VIA ORAL DIARIAMENTE (Patient not taking: Reported on 1/30/2024) 180 tablet 0    omeprazole (PriLOSEC) 20 mg delayed release capsule Take 1 capsule (20 mg total) by mouth 2 (two) times a day for 14 days 28 capsule 0     No current facility-administered medications for this visit.       No Known Allergies    PAST HISTORY    Past Medical History:   Diagnosis Date    ASCUS with positive high risk HPV cervical     HPV 18    Asthma     Chlamydia infection     Depression     IBS (irritable bowel syndrome)     Migraine     Varicose veins of both lower extremities        Past Surgical History:   Procedure Laterality Date    COLPOSCOPY         Social History     Tobacco Use    Smoking status: Every Day     Current packs/day: 1.00     Average packs/day: 1 pack/day for 15.0 years (15.0 ttl pk-yrs)     Types: Cigarettes    Smokeless tobacco: Never   Vaping Use    Vaping status: Never Used   Substance Use Topics    Alcohol use: Yes     Comment: social, not often    Drug use: Never       Family History   Problem Relation Age of Onset    Cirrhosis Father     No Known Problems Sister     Diabetes Brother     No Known Problems Brother     Colon cancer Maternal Uncle     No Known Problems Maternal Grandmother     No Known Problems Maternal Grandfather     No Known Problems Paternal Grandmother     No Known Problems Paternal Grandfather     Autism Son     Depression Son     No Known Problems Cousin          Above history personally reviewed.       EXAM    Vitals:Blood pressure 110/76, pulse 88, temperature 98.6 °F (37 °C), temperature source Temporal, resp. rate 16, height 5' 3\" (1.6 m), weight 69.4 kg (153 lb), SpO2 99%.,Body mass index is 27.1 kg/m².     Physical Exam  Constitutional:       General: She " is not in acute distress.     Appearance: She is well-developed.   HENT:      Head: Normocephalic and atraumatic.   Eyes:      Extraocular Movements: Extraocular movements intact.      Pupils: Pupils are equal, round, and reactive to light.   Neck:      Trachea: No tracheal deviation.   Cardiovascular:      Rate and Rhythm: Normal rate.   Pulmonary:      Effort: Pulmonary effort is normal.   Abdominal:      Palpations: Abdomen is soft.      Tenderness: There is no abdominal tenderness. There is no guarding.   Musculoskeletal:      Cervical back: Normal range of motion and neck supple.      Comments: Mild TTP of the cervical spine midline and paraspinal.    Skin:     General: Skin is warm and dry.      Coloration: Skin is not pale.   Neurological:      Mental Status: She is alert and oriented to person, place, and time.      Comments: GCS 15, Awake, Alert, Oriented x 3    Motor: MARIE, strength RUE 4+/5, LUE SF/EF 4/5, IO 4/5. BLE 4+/5.     Sensation:  intact to LT/PP X 4 with slight hypersensitivity on the left hand.     Reflexes: 2+ and symmetric, no blanco's or clonus     Coordination: no drift bilateral upper extremities.          Psychiatric:         Behavior: Behavior normal.         Neurologic Exam     Mental Status   Oriented to person, place, and time.     Cranial Nerves     CN III, IV, VI   Pupils are equal, round, and reactive to light.        MEDICAL DECISION MAKING    Imaging Studies:     MRI brain without contrast    Result Date: 2/13/2024  Narrative: MRI BRAIN WITHOUT CONTRAST INDICATION: G43.E09: Chronic migraine with aura, not intractable, without status migrainosus. COMPARISON:   Head CT from January 7, 2010. TECHNIQUE:  Multiplanar, multisequence imaging of the brain was performed. Imaging performed on 1.5T MRI IMAGE QUALITY:  Diagnostic. FINDINGS: BRAIN PARENCHYMA: No acute infarction.  No edema or mass effect. There are no white matter changes in the cerebral hemispheres. VENTRICLES:  Normal for  the patient's age. SELLA AND PITUITARY GLAND:  Normal. ORBITS:  Normal. PARANASAL SINUSES: Scant mucosal thickening in the left posterior ethmoid air cells and left maxillary sinus. VASCULATURE:  Evaluation of the major intracranial vasculature demonstrates appropriate flow voids. CALVARIUM AND SKULL BASE:  Normal. EXTRACRANIAL SOFT TISSUES:  Normal.     Impression: No intracranial abnormality. Mild inflammatory paranasal sinus disease. Workstation performed: [id number]     MRI cervical spine wo contrast    Result Date: 1/23/2024  Narrative: MRI CERVICAL SPINE WITHOUT CONTRAST INDICATION: M54.2: Cervicalgia.   left neck and arm pain, numbness, tingling COMPARISON: Cervical spine radiograph 11/10/2023. TECHNIQUE:  Multiplanar, multisequence imaging of the cervical spine was performed. . IMAGE QUALITY:  Diagnostic FINDINGS: ALIGNMENT: Straightened cervical spine. No compression fracture.  No subluxation.  No scoliosis. MARROW SIGNAL:  Normal marrow signal is identified within the visualized bony structures.  No discrete marrow lesion. CERVICAL AND VISUALIZED THORACIC CORD:  Normal signal within the visualized cord. PREVERTEBRAL AND PARASPINAL SOFT TISSUES:  Normal. VISUALIZED POSTERIOR FOSSA:  The visualized posterior fossa demonstrates no abnormal signal. CERVICAL DISC SPACES: Multilevel disc desiccation and mild disc height loss, worse at C6-C7. C1-C2: Normal. C2-C3: Normal. C3-C4: Tiny central disc protrusion. No significant canal stenosis or foraminal narrowing. C4-C5: Normal. C5-C6: Mild diffuse disc bulge, small left subarticular disc protrusion contacting left ventral cord. Mild canal stenosis. Mild left foraminal narrowing. C6-C7: Mild diffuse disc bulge, small left foraminal disc protrusion. Mild canal stenosis. Moderate-to-severe left foraminal narrowing. C7-T1: Normal. UPPER THORACIC DISC SPACES:  Normal. OTHER FINDINGS: Partially imaged mild mucosal thickening in left maxillary sinus.     Impression:  Multilevel degenerative changes of cervical spine with varying degrees of canal stenosis (mild C5-C6 and C6-C7) and foraminal narrowing (moderate-to-severe left C6-C7), as detailed above. The study was marked in EPIC for significant notification. Workstation performed: [id number]       I have personally reviewed pertinent reports.   and I have personally reviewed pertinent films in PACS

## 2024-02-16 NOTE — ASSESSMENT & PLAN NOTE
Pt presents for consultation for neck pain which mainly radiates to LUE with associated numbness and weakness x 1 year.     Imaging reviewed personally. Final results below discussed with the patient.   MRI Cervical spine 1/19/24: Multilevel degenerative changes of cervical spine with varying degrees of canal stenosis (mild C5-C6 and C6-C7) and foraminal narrowing (moderate-to-severe left C6-C7).      Plan  Pain control with OTC and prescribed medications as needed. Recommend rescheduling consult with pain mgt for further evaluation and pain mgt.   Recommend pt continue PT as tolerated.   At this time, recommend pt trial conservative measures with pain mgt and PT as tolerated. If sx persist or worsen despite conservative measures for about 6 weeks, patient to follow-up with neurosurgery with flexion-extension x-rays of the cervical spine for further evaluation.  Discussed plan of care with patient who showed understanding.   Patient made aware to contact neurosurgery with any questions or concerns.

## 2024-02-20 ENCOUNTER — OFFICE VISIT (OUTPATIENT)
Dept: PHYSICAL THERAPY | Facility: REHABILITATION | Age: 39
End: 2024-02-20
Payer: COMMERCIAL

## 2024-02-20 DIAGNOSIS — R20.0 LEFT ARM NUMBNESS: ICD-10-CM

## 2024-02-20 DIAGNOSIS — M54.2 NECK PAIN ON LEFT SIDE: Primary | ICD-10-CM

## 2024-02-20 DIAGNOSIS — M54.12 CERVICAL RADICULOPATHY: ICD-10-CM

## 2024-02-20 DIAGNOSIS — J45.20 MILD INTERMITTENT ASTHMA WITHOUT COMPLICATION: ICD-10-CM

## 2024-02-20 PROCEDURE — 97140 MANUAL THERAPY 1/> REGIONS: CPT

## 2024-02-20 PROCEDURE — 97110 THERAPEUTIC EXERCISES: CPT

## 2024-02-20 PROCEDURE — 97112 NEUROMUSCULAR REEDUCATION: CPT

## 2024-02-20 RX ORDER — FLUTICASONE PROPIONATE AND SALMETEROL 100; 50 UG/1; UG/1
1 POWDER RESPIRATORY (INHALATION) 2 TIMES DAILY
Qty: 180 BLISTER | Refills: 1 | Status: SHIPPED | OUTPATIENT
Start: 2024-02-20 | End: 2024-08-18

## 2024-02-20 NOTE — PROGRESS NOTES
"Daily Note     Today's date: 2024  Patient name: Zelda Mayo  : 1985  MRN: [id number]  Referring provider: Mary Silva PA-C  Dx:   Encounter Diagnosis     ICD-10-CM    1. Neck pain on left side  M54.2       2. Cervical radiculopathy  M54.12       3. Left arm numbness  R20.0                      Subjective: Pt reported increased symptoms after taking 2 weeks off from PT. She noted neck and shoulder tightness persists. She had an MRI of her cervical spine      Objective: See treatment diary below      Assessment: Tolerated treatment fair. Patient demonstrated fatigue post treatment and exhibited good technique with therapeutic exercises. Pt noted discomfort throughout session but was able to perform exercises to her tolerance. Constant pain in neck/UT and travels down into shoulder. Pt noted some relief with post manuals.       Plan: Continue per plan of care.      Precautions: ESL,     POC expires Unit limit Auth Expiration date PT/OT + Visit Limit?   24 BOMN  na         Visit/Unit Tracking  AUTH Status:  Date 24   Auth after 24 visits Used 1 2 3 4 5 6 7 8 9    Remaining  23 22 21 20 19 18 17 16 15      Pertinent Findings:      POC End Date: 23                                                                                          Test / Measure  9/15/2022 1/18   FOTO (Predicted 58) 47 45   L ROT 35 65   Distal most s/s L hand L Elbow          RE NV    Manuals    C/S Ddx KS 3' KS 3' TC 3'     C/S upglides KS 3' KS 3'      C/S STM KS 10' KS 8' TC 8'  TC 5'   UT and LS stretch     TC 5'   SH PROM  KS 8' TC 8' KS 8' TC 8'   GHJ Inf glides    KS    Neuro Re-Ed        C/S RET 2x15 supine 2x15 supine 2x15 supine     LS S 2x30\" 2x30\" 2x30\"     Upp trap S 2x30\" 2x30\" 2x30\"     Shrugs 2x15 + ROT 8# 2x15 + ROT 8# 2x15 + ROT 8# 2x15 + ROT 8# 2x15 + ROT 8#   C/S RET+ ext 2x10 2x10 2x10     DNF        Cane [place] [employer]  2x15 " "windowsill 2x15 windowsill 2x15 windowsil 2x15 windowsill   Supine scap punches   3\"x10     Wall slides                Ther Ex        HEP Review        UBE 5' retro 5' retro 5' retro Pulley 8' Pulley 8'   TB/dana LPD 2x10 12# 2x10 12# 12# 2x10 3x10 mtb 3x10 12#   TB/dana rows 2x10 17# 2x10 17# 17# 2x10 3x10 mtb 3x10 17#   [place] ER  3x10 3# 3# 3x10 3x10 3# SLER 3x10 3# SLER   Cane AAROM Flexion    2x20    Ther Activity                        Gait Training                        Modalities                                                       "

## 2024-02-21 ENCOUNTER — CONSULT (OUTPATIENT)
Dept: MULTI SPECIALTY CLINIC | Facility: CLINIC | Age: 39
End: 2024-02-21

## 2024-02-21 VITALS — HEIGHT: 62 IN | TEMPERATURE: 98.9 F | BODY MASS INDEX: 28.52 KG/M2 | WEIGHT: 155 LBS

## 2024-02-21 DIAGNOSIS — R21 RASH: Primary | ICD-10-CM

## 2024-02-21 DIAGNOSIS — L72.0 EPIDERMAL INCLUSION CYST: ICD-10-CM

## 2024-02-21 DIAGNOSIS — L98.9 SKIN LESION: ICD-10-CM

## 2024-02-21 PROCEDURE — 99244 OFF/OP CNSLTJ NEW/EST MOD 40: CPT | Performed by: STUDENT IN AN ORGANIZED HEALTH CARE EDUCATION/TRAINING PROGRAM

## 2024-02-21 RX ORDER — TACROLIMUS 1 MG/G
OINTMENT TOPICAL 2 TIMES DAILY
Qty: 100 G | Refills: 3 | Status: SHIPPED | OUTPATIENT
Start: 2024-02-21

## 2024-02-21 NOTE — PROGRESS NOTES
"[facility] Dermatology Clinic Note     Patient Name: Zelda Mayo  Encounter Date: 2/21/24     Have you been cared for by a [facility] Dermatologist in the last 3 years and, if so, which description applies to you?    NO.   I am considered a \"new\" patient and must complete all patient intake questions. I am FEMALE/of child-bearing potential.    REVIEW OF SYSTEMS:  Have you recently had or currently have any of the following? Recent fever or chills? No  Any non-healing wound? No  Are you pregnant or planning to become pregnant? No  Are you currently or planning to be nursing or breast feeding? No   PAST MEDICAL HISTORY:  Have you personally ever had or currently have any of the following?  If \"YES,\" then please provide more detail. Skin cancer (such as Melanoma, Basal Cell Carcinoma, Squamous Cell Carcinoma?  No  Tuberculosis, HIV/AIDS, Hepatitis B or C: No  Radiation Treatment No   HISTORY OF IMMUNOSUPPRESSION:   Do you have a history of any of the following:  Systemic Immunosuppression such as Diabetes, Biologic or Immunotherapy, Chemotherapy, Organ Transplantation, Bone Marrow Transplantation?  No    Answering \"YES\" requires the addition of the dotphrase \"IMMUNOSUPPRESSED\" as the first diagnosis of the patient's visit.   FAMILY HISTORY:  Any \"first degree relatives\" (parent, brother, sister, or child) with the following?    Skin Cancer, Pancreatic or Other Cancer? No   PATIENT EXPERIENCE:    Do you want the Dermatologist to perform a COMPLETE skin exam today including a clinical examination under the \"bra and underwear\" areas?  NO  If necessary, do we have your permission to call and leave a detailed message on your Preferred Phone number that includes your specific medical information?  Yes      No Known Allergies   Current Outpatient Medications:     albuterol (PROVENTIL HFA,VENTOLIN HFA) 90 mcg/act inhaler, INHALE 2 PUFFS EVERY 6 (SIX) HOURS AS NEEDED FOR WHEEZING, Disp: 18 g, Rfl: 3    clotrimazole " (LOTRIMIN) 1 % cream, Apply topically 2 (two) times a day, Disp: 45 g, Rfl: 1    cyclobenzaprine (FLEXERIL) 10 mg tablet, TAKE 1 TABLET (10 MG TOTAL) BY MOUTH 2 (TWO) TIMES A DAY AS NEEDED FOR MUSCLE SPASMS, Disp: 30 tablet, Rfl: 2    diazepam (VALIUM) 2 mg tablet, Take one tablet 15 minutes prior to MRI (Patient not taking: Reported on 2/16/2024), Disp: 1 tablet, Rfl: 0    dicyclomine (BENTYL) 10 mg capsule, TAKE 2 CAPSULES BY MOUTH 3 (THREE) TIMES A DAY AS NEEDED FOR (NAUSEA, ABDOMINAL PAIN), Disp: 90 capsule, Rfl: 0    fluticasone (FLONASE) 50 mcg/act nasal spray, 2 sprays into each nostril continuous as needed for rhinitis, Disp: 2 Bottle, Rfl: 0    Fluticasone-Salmeterol (Advair) 100-50 mcg/dose inhaler, INHALE 1 PUFF 2 (TWO) TIMES A DAY RINSE MOUTH AFTER USE., Disp: 180 blister, Rfl: 1    gabapentin (NEURONTIN) 400 mg capsule, FERN ELIZABETH CAPSULA POR VIA ORAL ROBEL VECES AL ROGERIO, Disp: , Rfl:     Ibuprofen 200 MG CAPS, Take 400 mg by mouth every 6 (six) hours as needed, Disp: , Rfl:     mirtazapine (REMERON) 15 mg tablet, FERN 1 TABLETA POR VIA ORAL DIARIAMENTE ANTES DE DORMIR EN LA NOCHE (Patient not taking: Reported on 2/16/2024), Disp: , Rfl:     montelukast (SINGULAIR) 10 mg tablet, TAKE 1 TABLET POR VIA ORAL DIARIAMENTE (Patient not taking: Reported on 1/30/2024), Disp: 180 tablet, Rfl: 0    omeprazole (PriLOSEC) 20 mg delayed release capsule, Take 1 capsule (20 mg total) by mouth daily, Disp: 30 capsule, Rfl: 2    omeprazole (PriLOSEC) 20 mg delayed release capsule, Take 1 capsule (20 mg total) by mouth 2 (two) times a day for 14 days, Disp: 28 capsule, Rfl: 0    PARoxetine (PAXIL) 40 MG tablet, FERN 1 TABLETA POR VIA ORAL CADA MA?GOSIA, Disp: , Rfl:     QUEtiapine (SEROquel) 100 mg tablet, FERN 1 TABLETA POR VIA ORAL CADA MA?GOSIA, Disp: , Rfl:     QUEtiapine (SEROquel) 300 mg tablet, FERN 1 TABLETA POR VIA ORAL ANTES DE DORMIR EN LA NOCHE, Disp: , Rfl:     rizatriptan (Maxalt) 10 mg tablet, Take 1 tablet  (10 mg total) by mouth as needed for migraine Take at the onset of migraine; if symptoms continue or return, may take another dose at least 2 hours after first dose. Take no more than 2 doses in a day., Disp: 10 tablet, Rfl: 3    topiramate (Topamax) 25 mg tablet, Take 2 tablets (50 mg total) by mouth daily at bedtime, Disp: 60 tablet, Rfl: 3    vitamin B-12 (CYANOCOBALAMIN) 250 MCG TABS, Take 1 tablet (250 mcg total) by mouth daily, Disp: 90 tablet, Rfl: 1      Lump on right jaw and rash on arms chest and face.    Whom besides the patient is providing clinical information about today's encounter?   NO ADDITIONAL HISTORIAN (patient alone provided history)    Physical Exam and Assessment/Plan by Diagnosis:        RASH- Possibly ERYTHEMA ANNULARE CENTRIFIGUM    Physical Exam:  (Anatomic Location); (Size and Morphological Description); (Differential Diagnosis):  Upper arms; circular erythematous patches with central trailing scale  EAC vs GA vs eczematous dermatitis  Pertinent Positives:  Pertinent Negatives:    Additional History of Present Condition:  Present for ~1 week, somewhat itchy. Started several weeks after taking medications for H. Pylori. Patient has not had this rash in the past. She has only tried Aveeno on this rash.    Plan:  Suspect EAC given rash has trailing scale and timing of eruption. Possibly from antibiotics ie amoxicillin for H. Pylori treatment vs reaction to virus. Explained that the rash often lasts days to months after the trigger has resolved.   Start tacrolimus ointment. Apply twice daily to areas of involvement. May burn with initial applications.       PROCEDURES PERFORMED TODAY ASSOCIATED WITH THIS CONDITION:          NONE.     Medical Complexity:    UNDIAGNOSED NEW PROBLEM WITH UNCERTAIN DIAGNOSIS.  A condition included in the differential diagnosis represents a high risk of morbidity without treatment.       EPIDERMAL INCLUSION CYST    Physical Exam:  Anatomic Location Affected:   right jawline  Morphological Description:  small 2-3 mm cyst with central punctum  Pertinent Positives:  Pertinent Negatives:    Additional History of Present Condition:  It has been present for more years than the patient can count    Assessment and Plan:  Based on a thorough discussion of this condition and the management approach to it (including a comprehensive discussion of the known risks, side effects and potential benefits of treatment), the patient (family) agrees to implement the following specific plan:  Discussed removal with patient at [facility] on a Wednesday for excision. She understands this will require stitches. We will reach out to her to schedule this.      What are epidermal inclusion cysts?  Epidermal inclusion cysts are the most common, benign cutaneous cysts. There are many different names for epidermal inclusion cysts, including epidermoid cyst, epidermal cyst, infundibular cyst, inclusion cyst, and keratin cyst. These cysts can occur anywhere on the body and typically present as nodules directly underneath the skin. There is often a visible pore or opening in the center. The cysts are freely moveable and can range from a few millimeters to several centimeters in diameter. The center of epidermoid cysts almost always contains keratin, which has a cheesy appearance, and not sebum. They also do not originate from sebaceous glands. Therefore, epidermal inclusion cysts are not the same as sebaceous cysts.    Cysts may remain stable or progressively enlarge over time. There are no reliable predictive factors to tell if an epidermal inclusion cyst will enlarge, become inflamed, or remain quiescent. Infected cysts tend to become larger, turn red, and are more noticeable to the patient. There may be accompanying pain and discomfort.     What causes epidermal inclusion cysts?  Epidermal inclusion cysts often appear out of the blue and are not contagious. They are due to a proliferation of  epidermal cells within the dermis and are more common in men than women. They occur more frequently in patients in their 20s to 40s. Epidermal inclusion cysts by themselves are usually not inherited, but they can be hereditary in rare syndromes such as Rivera syndrome, nodular elastosis with cysts and comedones (Favre-Racouchot syndrome), and basal cell nevus syndrome (Gorlin syndrome). Elderly patients with chronic sun-damaged skin areas have a higher likelihood of developing epidermoid cysts. They often occur in areas where hair follicles have been inflamed or repeatedly irritated are more frequent in patients with acne vulgaris. In the  period, they are called milia.     Patients on BRAF inhibitors such as imiquimod and cyclosporine have a higher incidence of epidermoid cysts of the face.    How do we diagnose an epidermal inclusion cyst?  Epidermoid inclusion cysts are often diagnosed by history and physical exam. There is usually no need for biopsy prior to removal.  Radiographic and laboratory exams, such as ultrasound studies, are unnecessary and not typically ordered unless the practitioner suspects a genetic condition.    What is the treatment for an epidermal inclusion cyst?  Inflamed, uninfected epidermal inclusion cysts rarely resolve spontaneously without therapy or surgical intervention. Treatment is not emergent unless desired by the patient.     Definitive treatment is via surgical excision with walls intact. This method will prevent recurrence. This is best done when the cyst is not inflamed, to decrease the probability of rupture during surgery.   A local anesthetic will be injected around the cyst  A small incision is made in the skin overlying the cyst, and contents are expressed  The incision is repaired with sutures    Another option is to use a 4mm punch biopsy with cyst extraction through the defect.    Incision and drainage is often needed if the cyst is infected or inflamed. If  there is surrounding cellulitis, oral antibiotic therapy may be necessary. The common agents used target methicillin sensitive Staphylococcal aureus and methicillin resistant S aureus in areas of high prevalence.     Mary Kay Goldman  PGY3 Dermatology Resident

## 2024-02-22 ENCOUNTER — OFFICE VISIT (OUTPATIENT)
Dept: PHYSICAL THERAPY | Facility: REHABILITATION | Age: 39
End: 2024-02-22
Payer: COMMERCIAL

## 2024-02-22 DIAGNOSIS — M54.2 NECK PAIN ON LEFT SIDE: Primary | ICD-10-CM

## 2024-02-22 DIAGNOSIS — M54.12 CERVICAL RADICULOPATHY: ICD-10-CM

## 2024-02-22 PROCEDURE — 97110 THERAPEUTIC EXERCISES: CPT

## 2024-02-22 PROCEDURE — 97112 NEUROMUSCULAR REEDUCATION: CPT

## 2024-02-22 PROCEDURE — 97140 MANUAL THERAPY 1/> REGIONS: CPT

## 2024-02-22 NOTE — PROGRESS NOTES
"Daily Note     Today's date: 2024  Patient name: Zelda Mayo  : 1985  MRN: [id number]  Referring provider: Mary Silva PA-C  Dx:   Encounter Diagnosis     ICD-10-CM    1. Neck pain on left side  M54.2       2. Cervical radiculopathy  M54.12                      Subjective: Patient reports her neck is bothering her today.  Having symptoms in her left arm today.        Objective: See treatment diary below      Assessment: Tolerated treatment fair. Patient demonstrated fatigue post treatment and exhibited good technique with therapeutic exercises. Patient notes a decrease in left arm pain following exercises.  Moderate tightness in upper thoracic area.        Plan: Continue per plan of care.      Precautions: ESL,     POC expires Unit limit Auth Expiration date PT/OT + Visit Limit?   24 BOMN  na         Visit/Unit Tracking  AUTH Status:  Date 24   Auth after 24 visits Used 10 2 3 4 5 6 7 8 9    Remaining  14 22 21 20 19 18 17 16 15      Pertinent Findings:      POC End Date: 23                                                                                          Test / Measure  9/15/2022 1/18   FOTO (Predicted 58) 47 45   L ROT 35 65   Distal most s/s L hand L Elbow          RE NV     Manuals    C/S Ddx KS 3' KS 3' TC 3'      C/S upglides KS 3' KS 3'       C/S STM KS 10' KS 8' TC 8'  TC 5' Ksg 5'   UT and LS stretch     TC 5' KSG 5'   SH PROM  KS 8' TC 8' KS 8' TC 8'    GHJ Inf glides    KS     Neuro Re-Ed         C/S RET 2x15 supine 2x15 supine 2x15 supine   2x15   LS S 2x30\" 2x30\" 2x30\"      Upp trap S 2x30\" 2x30\" 2x30\"      Shrugs 2x15 + ROT 8# 2x15 + ROT 8# 2x15 + ROT 8# 2x15 + ROT 8# 2x15 + ROT 8#    C/S RET+ ext 2x10 2x10 2x10   2x10   DNF         Cane [place] Press  2x15 windowsill 2x15 windowsill 2x15 windowsil 2x15 windowsill    Supine scap punches   3\"x10      Wall slides         Active UT stretch with " arm behind back      2x10   SNAG cervical extension      2x10   SNAG rotation      2x10   Repeated cervical extension      2x10   Ther Ex         HEP Review         UBE 5' retro 5' retro 5' retro Pulley 8' Pulley 8' Pulley 8'   TB/dana LPD 2x10 12# 2x10 12# 12# 2x10 3x10 mtb 3x10 12# 3x10 12#   TB/dana rows 2x10 17# 2x10 17# 17# 2x10 3x10 mtb 3x10 17# 3x10 17#   [place] ER  3x10 3# 3# 3x10 3x10 3# SLER 3x10 3# SLER 3x10 3#  SL ER   Seated Thoracic ext with 1/2 foam roll      2x10                     Cane AAROM Flexion    2x20     Ther Activity                           Gait Training                           Modalities

## 2024-02-26 ENCOUNTER — ANNUAL EXAM (OUTPATIENT)
Dept: OBGYN CLINIC | Facility: CLINIC | Age: 39
End: 2024-02-26

## 2024-02-26 VITALS
DIASTOLIC BLOOD PRESSURE: 77 MMHG | RESPIRATION RATE: 18 BRPM | SYSTOLIC BLOOD PRESSURE: 109 MMHG | BODY MASS INDEX: 28.45 KG/M2 | HEART RATE: 90 BPM | WEIGHT: 154.6 LBS | HEIGHT: 62 IN

## 2024-02-26 DIAGNOSIS — Z01.411 ENCOUNTER FOR WELL WOMAN EXAM WITH ABNORMAL FINDINGS: ICD-10-CM

## 2024-02-26 DIAGNOSIS — Z12.31 ENCOUNTER FOR SCREENING MAMMOGRAM FOR MALIGNANT NEOPLASM OF BREAST: ICD-10-CM

## 2024-02-26 DIAGNOSIS — Z11.3 SCREENING EXAMINATION FOR STD (SEXUALLY TRANSMITTED DISEASE): Primary | ICD-10-CM

## 2024-02-26 PROCEDURE — 87491 CHLMYD TRACH DNA AMP PROBE: CPT

## 2024-02-26 PROCEDURE — 99385 PREV VISIT NEW AGE 18-39: CPT | Performed by: OBSTETRICS & GYNECOLOGY

## 2024-02-26 PROCEDURE — 87591 N.GONORRHOEAE DNA AMP PROB: CPT

## 2024-02-26 PROCEDURE — G0476 HPV COMBO ASSAY CA SCREEN: HCPCS

## 2024-02-26 PROCEDURE — G0145 SCR C/V CYTO,THINLAYER,RESCR: HCPCS

## 2024-02-26 RX ORDER — NICOTINE POLACRILEX 2 MG
LOZENGE BUCCAL
COMMUNITY
Start: 2024-02-20

## 2024-02-26 NOTE — PROGRESS NOTES
"OB/GYN ANNUAL H&P    SUBJECTIVE:    Zelda Mayo is a 38 y.o.  female who presents for annual well woman H&P.    GYN:  Menarche 12   Periods are regular, occur every 28 days, last 5 days. Was irregular when she was younger. Has been regular for last 2 years. Was on birth control \"a long time ago\" took OCPs and injection. Denies menorrhagia or pain.   Abnormal vaginal discharge: denies  Genital lesions: denies  Genital irritation or itching: denies  Sexually active: yes with 1 man  Dyspareunia: denies  Contraception: denies  STI history: had something years ago but doesn't remember. Was treated.   Gynecologic surgical history: denies    OB:   female  Prior pregnancies uncomplicated. 2     :  Dysuria: denies  Hematuria: denies  Urgency: denies  Frequency: denies  Urinary incontinence: stress urinary incontinence. Patient wants to hold off on Pt or a referral at this time as she is juggling so many other treatments and doctors appointments currently    Breast:  Mass: denies  Skin changes: denies  Reddening: denies  Dimpling: denies  Pain: denies  Nipple discharge: denies    General:  Diet: well balanced, eats at home a lot  Exercise: limited by pain and appointments   Work: not currently   Alcohol use: rarely, not much now with medicine   Tobacco use: 0.5 ppd - 1 ppd   Recreational drug use: denies    Family history:  Breast cancer: denies  Endometrial cancer: denies  Ovarian cancer: denies    Screening:  Cervical cancer  Last pap smear on 2022 showed ASCUS. Colpo on 2022 revealed DEYANIRA I at 6, 11, and 12 oclock  Repeat pap and Hpv testing today   Breast cancer:  Last mammogram on 2021 showed BIRADS -2 bilaterally  Repeat screening mammogram ordered today   Colon cancer:  Due for first routine screening colonoscopy at 45 yo  STI screening:   GC/CT, RPR, HIV, Hep B, Hep C    Immunizations:  COVID: none  Flu: yes this year   Gardasil: yes     Review of Systems:  Pertinent items are " "noted in HPI.    OBJECTIVE:    Vitals:   /77 (BP Location: Left arm, Patient Position: Sitting, Cuff Size: Adult)   Pulse 90   Resp 18   Ht 5' 2\" (1.575 m)   Wt 70.1 kg (154 lb 9.6 oz)   LMP 02/13/2024 (Exact Date)   BMI 28.28 kg/m²     Physical Exam  Constitutional:       General: She is not in acute distress.     Appearance: Normal appearance. She is not ill-appearing.   Genitourinary:      Genitourinary Comments: Normal appearing external female genitalia without lesion or laceration. Normal appearing vagina and cervix without lesion or laceration. Pap collected  Normal bimanual examination without CMT or adnexal fullness  Normal breast examination bilaterally    HENT:      Head: Normocephalic and atraumatic.      Mouth/Throat:      Mouth: Mucous membranes are moist. No oral lesions.   Eyes:      General: No scleral icterus.     Extraocular Movements: Extraocular movements intact.   Cardiovascular:      Rate and Rhythm: Normal rate and regular rhythm.      Pulses: Normal pulses.      Heart sounds: Normal heart sounds.   Pulmonary:      Effort: Pulmonary effort is normal. No respiratory distress.      Breath sounds: Normal breath sounds. No wheezing, rhonchi or rales.   Abdominal:      General: Abdomen is flat. There is no distension.      Tenderness: There is no abdominal tenderness. There is no guarding.   Musculoskeletal:      Right lower leg: No edema.      Left lower leg: No edema.   Neurological:      General: No focal deficit present.      Mental Status: She is alert.   Skin:     General: Skin is warm and dry.   Psychiatric:         Attention and Perception: Attention normal.         Mood and Affect: Mood normal.         Speech: Speech normal.         Behavior: Behavior normal.         Thought Content: Thought content normal.         Judgment: Judgment normal.   Vitals and nursing note reviewed. Exam conducted with a chaperone present.                  ASSESSMENT:    Zelda Mayo is a 38 y.o. "  with history of abnormal gynecologic exam.    PLAN:  Mammogram  Pap and Hpv collected today   STD testing ordered   No PT referral for DG due to patient having a lot of other health needs at this time  F/u Pap for next steps     Cathi Gamez MD  24  3:45 PM

## 2024-02-27 ENCOUNTER — OFFICE VISIT (OUTPATIENT)
Dept: PHYSICAL THERAPY | Facility: REHABILITATION | Age: 39
End: 2024-02-27
Payer: COMMERCIAL

## 2024-02-27 DIAGNOSIS — M54.12 CERVICAL RADICULOPATHY: ICD-10-CM

## 2024-02-27 DIAGNOSIS — R20.0 LEFT ARM NUMBNESS: ICD-10-CM

## 2024-02-27 DIAGNOSIS — M54.2 NECK PAIN ON LEFT SIDE: Primary | ICD-10-CM

## 2024-02-27 PROCEDURE — 97164 PT RE-EVAL EST PLAN CARE: CPT | Performed by: PHYSICAL THERAPIST

## 2024-02-27 PROCEDURE — 97140 MANUAL THERAPY 1/> REGIONS: CPT | Performed by: PHYSICAL THERAPIST

## 2024-02-27 PROCEDURE — 97112 NEUROMUSCULAR REEDUCATION: CPT | Performed by: PHYSICAL THERAPIST

## 2024-02-27 PROCEDURE — 97110 THERAPEUTIC EXERCISES: CPT | Performed by: PHYSICAL THERAPIST

## 2024-02-27 NOTE — PROGRESS NOTES
"PT Re-Evaluation     Today's date: 2024  Patient name: Zelda Mayo  : 1985  MRN: [id number]  Referring provider: Mary Silva PA-C  Dx:   Encounter Diagnosis     ICD-10-CM    1. Neck pain on left side  M54.2       2. Cervical radiculopathy  M54.12       3. Left arm numbness  R20.0           Start Time: 1345  Stop Time: 1440  Total time in clinic (min): 55 minutes    Assessment/Plan  Pt demonstrating improvement in cervical Rom and radicular s/s. Continue to report shoulder pain with PROM and AROM of GHJ. Reccomoned continued skilled PT emphasizing L GHJ tx 1-2/wk for 4weeks.       New Goals:  In 4 weeks, the patient will:  Pain free driving with afffected UE.   2. SH ER MMT to >4/5  3. SH PROM to >155 pain free GHJ elevation      Subjective  Pt reports improvement in neck symptoms but still has underlying shoudler pain  Objective             Precautions: ESL,     POC expires Unit limit Auth Expiration date PT/OT + Visit Limit?   3/27/24 BOMN  na         Visit/Unit Tracking  AUTH Status:  Date 24   Auth after 24 visits Used 10 11    Remaining  14 13      Pertinent Findings:                                                                                           Test / Measure  9/15/2022 1/18 2/27   FOTO (Predicted 58) 47 45    L ROT 35 65 WNL   Distal most s/s L hand L Elbow  L shoulder         RE NV      Manuals    SH PROM  KS 8' TC 8' KS 8' TC 8'  KS   GHJ Inf glides    KS   KS   Neuro Re-Ed          Shrugs 2x15 + ROT 8# 2x15 + ROT 8# 2x15 + ROT 8# 2x15 + ROT 8# 2x15 + ROT 8#     Cane [place] Press  2x15 windowsill 2x15 windowsill 2x15 windowsil 2x15 windowsill  2x15 windowsil   Supine scap punches   3\"x10       Wall slides          Ther Ex          HEP Review          UBE 5' retro 5' retro 5' retro Pulley 8' Pulley 8' Pulley 8' Pulley 8'   TB/dana LPD 2x10 12# 2x10 12# 12# 2x10 3x10 mtb 3x10 12# 3x10 12#    TB/dana rows 2x10 17# 2x10 17# 17# 2x10 " 3x10 mtb 3x10 17# 3x10 17# 3x10 mtb   [place] ER  3x10 3# 3# 3x10 3x10 3# SLER 3x10 3# SLER 3x10 3#  SL ER 3x10 3# SLER                       Cane AAROM Flexion    2x20   2x30   Ther Activity                              Gait Training                              Modalities

## 2024-02-28 ENCOUNTER — TELEPHONE (OUTPATIENT)
Age: 39
End: 2024-02-28

## 2024-02-28 LAB
C TRACH DNA SPEC QL NAA+PROBE: NEGATIVE
N GONORRHOEA DNA SPEC QL NAA+PROBE: NEGATIVE

## 2024-02-28 NOTE — TELEPHONE ENCOUNTER
Pt called because she was given this a referral and this number for pain management.  I provided her with correct number

## 2024-02-29 ENCOUNTER — OFFICE VISIT (OUTPATIENT)
Dept: PHYSICAL THERAPY | Facility: REHABILITATION | Age: 39
End: 2024-02-29
Payer: COMMERCIAL

## 2024-02-29 DIAGNOSIS — M54.12 CERVICAL RADICULOPATHY: ICD-10-CM

## 2024-02-29 DIAGNOSIS — M54.2 NECK PAIN ON LEFT SIDE: Primary | ICD-10-CM

## 2024-02-29 DIAGNOSIS — R20.0 LEFT ARM NUMBNESS: ICD-10-CM

## 2024-02-29 PROCEDURE — 97140 MANUAL THERAPY 1/> REGIONS: CPT

## 2024-02-29 PROCEDURE — 97110 THERAPEUTIC EXERCISES: CPT

## 2024-02-29 NOTE — PROGRESS NOTES
Daily Note     Today's date: 2024  Patient name: Zelda Mayo  : 1985  MRN: [id number]  Referring provider: Mary Silva PA-C  Dx:   Encounter Diagnosis     ICD-10-CM    1. Neck pain on left side  M54.2       2. Cervical radiculopathy  M54.12       3. Left arm numbness  R20.0           Start Time: 1358  Stop Time: 1438  Total time in clinic (min): 40 minutes    Subjective: Reports that she is doing okay. Neck and shoulder fel about the same.       Objective: See treatment diary below      Assessment: Tolerated treatment well. Has trouble with relaxing during passive range, frequently having guarding response most notable during passive shoulder flexion. Reported good stretch with ER AAROM supine with cane.Continues to have very nice tolerance to periscapular strengthening. Seems to tolerate AAROM rather than passive motion.[place] was being used during session, scapular strengthening primarily performed with theraband. Some shrugging compensation is evident with pulldown especially when fatigued. Decreased strength with ER evident with associated pain with active movement in this plane. Patient demonstrated fatigue post treatment, exhibited good technique with therapeutic exercises, and would benefit from continued PT      Plan: Continue per plan of care.  Progress treatment as tolerated.       Precautions: ESL,     POC expires Unit limit Auth Expiration date PT/OT + Visit Limit?   3/27/24 BOMN  na         Visit/Unit Tracking  AUTH Status:  Date 24   Auth after 24 visits Used 10 11 12    Remaining  14 13 12      Pertinent Findings:                                                                                           Test / Measure  9/15/2022 1/18 2/27   FOTO (Predicted 58) 47 45    L ROT 35 65 WNL   Distal most s/s L hand L Elbow  L shoulder         RE NV      Manuals    SH PROM   KS 8' TC 8' KS 8' TC 8'  KS   GHJ Inf glides [facility]   KS  "  Neuro Re-Ed          Shrugs  2x15 + ROT 8# 2x15 + ROT 8# 2x15 + ROT 8# 2x15 + ROT 8#     Cane [place] Press NV  2x15 windowsill 2x15 windowsill 2x15 windowsil 2x15 windowsill  2x15 windowsil   Supine scap punches   3\"x10       Wall slides 2x15 flex         Ther Ex          HEP Review          UBE Pulley 8'  5' retro 5' retro Pulley 8' Pulley 8' Pulley 8' Pulley 8'   TB/dana LPD GTB 2x15   2x10 12# 12# 2x10 3x10 mtb 3x10 12# 3x10 12#    TB/dana rows MTB 2x15  2x10 17# 17# 2x10 3x10 mtb 3x10 17# 3x10 17# 3x10 mtb   [place] ER 3x10 3# SLER     3x10 OTB 3x10 3# 3# 3x10 3x10 3# SLER 3x10 3# SLER 3x10 3#  SL ER 3x10 3# SLER             Cane AAROM ER  2x30          Cane AAROM Flexion 2x30    2x20   2x30   Ther Activity                              Gait Training                              Modalities                                       "

## 2024-03-01 LAB
LAB AP GYN PRIMARY INTERPRETATION: NORMAL
Lab: NORMAL

## 2024-03-05 ENCOUNTER — APPOINTMENT (OUTPATIENT)
Dept: PHYSICAL THERAPY | Facility: REHABILITATION | Age: 39
End: 2024-03-05
Payer: COMMERCIAL

## 2024-03-07 ENCOUNTER — OFFICE VISIT (OUTPATIENT)
Dept: PHYSICAL THERAPY | Facility: REHABILITATION | Age: 39
End: 2024-03-07
Payer: COMMERCIAL

## 2024-03-07 DIAGNOSIS — M54.12 CERVICAL RADICULOPATHY: ICD-10-CM

## 2024-03-07 DIAGNOSIS — R20.0 LEFT ARM NUMBNESS: ICD-10-CM

## 2024-03-07 DIAGNOSIS — M54.2 NECK PAIN ON LEFT SIDE: Primary | ICD-10-CM

## 2024-03-07 PROCEDURE — 97140 MANUAL THERAPY 1/> REGIONS: CPT

## 2024-03-07 PROCEDURE — 97110 THERAPEUTIC EXERCISES: CPT

## 2024-03-07 NOTE — PROGRESS NOTES
Daily Note     Today's date: 3/7/2024  Patient name: Zelda Mayo  : 1985  MRN: [id number]  Referring provider: Mary Silva PA-C  Dx:   Encounter Diagnosis     ICD-10-CM    1. Neck pain on left side  M54.2       2. Cervical radiculopathy  M54.12       3. Left arm numbness  R20.0           Start Time: 1400  Stop Time: 1445  Total time in clinic (min): 45 minutes    Subjective: Patient states that her pain is not any better.  Patient states that most of the pain is in her left arm/shoulder than her neck.  Patient rates pain as 8/10.      Objective: See treatment diary below      Assessment: Tolerated treatment well.   Patient participated in skilled PT session focused on strengthening, stretching, and ROM.  Patient able to complete exercise program with some modification to resistance.  Patient experiences increased pain with increased wt used in the L shoulder area.  Patient demonstrates decreased L shoulder flexion and ER due to pain.  Patient required v.c. for proper technique and pacing with exercises, as well as, with posture.  Patient would continue to benefit from skilled PT interventions to address strengthening, stretching, and ROM. Patient demonstrated fatigue post treatment      Plan: Continue per plan of care.      Precautions: ESL,     POC expires Unit limit Auth Expiration date PT/OT + Visit Limit?   3/27/24 BOMN  na         Visit/Unit Tracking  AUTH Status:  Date 3/7 2/27 2/29   Auth after 24 visits Used 13 11 12    Remaining  11 13 12      Pertinent Findings:                                                                                           Test / Measure  9/15/2022 1/18 2/27   FOTO (Predicted 58) 47 45    L ROT 35 65 WNL   Distal most s/s L hand L Elbow  L shoulder         RE NV      Manuals 2/27  3/7 1/30 2/1 2/20 2/22 2/27   SH PROM MH  CD TC 8' KS 8' TC 8'  KS   GHJ Inf glides MH    KS   KS   Neuro Re-Ed          Shrugs   2x15 + ROT 8# 2x15 + ROT 8# 2x15 + ROT 8#     Cane  "[place] NV  10x windowsill increased pain in L shoulder deltoid area 2x15 windowsill 2x15 windowsil 2x15 windowsill  2x15 windowsil   Supine scap punches   3\"x10       Wall slides 2x15 flex 2x15   flex        Ther Ex          HEP Review          UBE Pulley 8'  Pulley x 8' 5' retro Pulley 8' Pulley 8' Pulley 8' Pulley 8'   TB/dana LPD GTB 2x15   GTB 2x10 12# 2x10 3x10 mtb 3x10 12# 3x10 12#    TB/dana rows MTB 2x15  GTB 2x10 17# 2x10 3x10 mtb 3x10 17# 3x10 17# 3x10 mtb   [place] ER 3x10 3# SLER     3x10 OTB S/L ER 3# 10x  0# 10x   Increased pain w/wt 3# 3x10 3x10 3# SLER 3x10 3# SLER 3x10 3#  SL ER 3x10 3# SLER             Cane AAROM ER  2x30  3x10        Cane AAROM Flexion 2x30  3x10  2x20   2x30   Ther Activity                              Gait Training                              Modalities                                         "

## 2024-03-12 ENCOUNTER — OFFICE VISIT (OUTPATIENT)
Dept: PHYSICAL THERAPY | Facility: REHABILITATION | Age: 39
End: 2024-03-12
Payer: COMMERCIAL

## 2024-03-12 DIAGNOSIS — R20.0 LEFT ARM NUMBNESS: ICD-10-CM

## 2024-03-12 DIAGNOSIS — M54.2 NECK PAIN ON LEFT SIDE: Primary | ICD-10-CM

## 2024-03-12 DIAGNOSIS — M54.12 CERVICAL RADICULOPATHY: ICD-10-CM

## 2024-03-12 PROCEDURE — 97140 MANUAL THERAPY 1/> REGIONS: CPT

## 2024-03-12 PROCEDURE — 97112 NEUROMUSCULAR REEDUCATION: CPT

## 2024-03-12 PROCEDURE — 97110 THERAPEUTIC EXERCISES: CPT

## 2024-03-12 NOTE — PROGRESS NOTES
"Daily Note     Today's date: 3/12/2024  Patient name: Zelda Mayo  : 1985  MRN: [id number]  Referring provider: Mary Silva PA-C  Dx:   Encounter Diagnosis     ICD-10-CM    1. Neck pain on left side  M54.2       2. Cervical radiculopathy  M54.12       3. Left arm numbness  R20.0                      Subjective: Pt reported continued pain in shoulder and pec. She noted reaching for objects causes increased pain. Seeing pain management this Friday.      Objective: See treatment diary below      Assessment: Tolerated treatment fair. Patient demonstrated fatigue post treatment and exhibited good technique with therapeutic exercises. VC's needed for correct technique throughout session. Some discomfort throughout session. Monitor symptoms NV.      Plan: Continue per plan of care.      Precautions: ESL,     POC expires Unit limit Auth Expiration date PT/OT + Visit Limit?   3/27/24 BOMN  na         Visit/Unit Tracking  AUTH Status:  Date 3/7 2/27 2/29 3/7 3/12   Auth after 24 visits Used 13 11 12 13 14    Remaining  11 13 12 11 10      Pertinent Findings:                                                                                           Test / Measure  9/15/2022 1/18 2/27   FOTO (Predicted 58) 47 45    L ROT 35 65 WNL   Distal most s/s L hand L Elbow  L shoulder             Manuals 3/7 3/12  2/22 2/27   SH PROM CD TC   KS   GHJ Inf glides     KS   Neuro Re-Ed        Shrugs        Cane [place] 10x windowsill increased pain in L shoulder deltoid area 2x10 windowsill (held after 10 due to pain)   2x15 windowsil   Supine scap punches        Wall slides 2x15   flex 2x15 flex      Doorway stretch  5\"x10 low arms      Ther Ex        HEP Review        UBE Pulley x 8' Pulley x8'  Pulley 8' Pulley 8'   TB/dana LPD GTB 2x10 12# 2x10  3x10 12#    TB/dana rows GTB 2x10 17# 2x10  3x10 17# 3x10 mtb   [place] ER S/L ER 3# 10x  0# 10x   Increased pain w/wt 3# 2x10  3x10 3#  SL ER 3x10 3# SLER           Cane " AAROM ER  3x10 3x10      Cane AAROM Flexion 3x10 3x10   2x30   Ther Activity                        Gait Training                        Modalities

## 2024-03-14 ENCOUNTER — OFFICE VISIT (OUTPATIENT)
Dept: PHYSICAL THERAPY | Facility: REHABILITATION | Age: 39
End: 2024-03-14
Payer: COMMERCIAL

## 2024-03-14 DIAGNOSIS — M54.2 NECK PAIN ON LEFT SIDE: Primary | ICD-10-CM

## 2024-03-14 DIAGNOSIS — M54.12 CERVICAL RADICULOPATHY: ICD-10-CM

## 2024-03-14 DIAGNOSIS — R20.0 LEFT ARM NUMBNESS: ICD-10-CM

## 2024-03-14 PROCEDURE — 97110 THERAPEUTIC EXERCISES: CPT

## 2024-03-14 PROCEDURE — 97140 MANUAL THERAPY 1/> REGIONS: CPT

## 2024-03-14 PROCEDURE — 97112 NEUROMUSCULAR REEDUCATION: CPT

## 2024-03-14 NOTE — PROGRESS NOTES
"Daily Note     Today's date: 3/14/2024  Patient name: Zelda Mayo  : 1985  MRN: [id number]  Referring provider: Mary Silva PA-C  Dx:   Encounter Diagnosis     ICD-10-CM    1. Neck pain on left side  M54.2       2. Cervical radiculopathy  M54.12       3. Left arm numbness  R20.0                      Subjective: Pt reports she is doing ok, her shoulder still has been having a lot of pain.       Objective: See treatment diary below      Assessment: Tolerated treatment well. Pt performed all exercises without issue, continue to progress to tolerance. Pt required occasional verbal cues for correct form and hold times with exercises. Pt tolerated PROM fair. Pt had noticeable pain with abd and ER PROM, less pain with flex ROM. Continue to focus on stretching and strengthening exercises to decrease pain and increase ROM. Patient demonstrated fatigue post treatment, exhibited good technique with therapeutic exercises, and would benefit from continued PT      Plan: Continue per plan of care.      Precautions: ESL,     POC expires Unit limit Auth Expiration date PT/OT + Visit Limit?   3/27/24 BOMN  na         Visit/Unit Tracking  AUTH Status:  Date 3/7 2/27 2/29 3/7 3/12 3/14   Auth after 24 visits Used 13 11 12 13 14 15    Remaining  11 13 12 11 10 9      Pertinent Findings:                                                                                           Test / Measure  9/15/2022 1/18 2/27   FOTO (Predicted 58) 47 45    L ROT 35 65 WNL   Distal most s/s L hand L Elbow  L shoulder             Manuals 3/7 3/12 3/14 2/22 2/27   SH PROM CD TC KM  KS   GHJ Inf glides     KS   Neuro Re-Ed        Shrugs        Cane [place] 10x windowsill increased pain in L shoulder deltoid area 2x10 windowsill (held after 10 due to pain) nv  2x15 windowsil   Supine scap punches        Wall slides 2x15   flex 2x15 flex 2x15 flex     Doorway stretch  5\"x10 low arms 5\"x10 low arms     Ther Ex        HEP Review      "   UBE Pulley x 8' Pulley x8' Pulley x8' Pulley 8' Pulley 8'   TB/dana LPD GTB 2x10 12# 2x10 12# 2x10 3x10 12#    TB/dana rows GTB 2x10 17# 2x10 17# 2x10 3x10 17# 3x10 mtb   [place] ER S/L ER 3# 10x  0# 10x   Increased pain w/wt 3# 2x10 3# 2x10 3x10 3#  SL ER 3x10 3# SLER           Cane AAROM ER  3x10 3x10 3x10     Cane AAROM Flexion 3x10 3x10 3x10  2x30   Ther Activity                        Gait Training                        Modalities                                        Carac Counseling:  I discussed with the patient the risks of Carac including but not limited to erythema, scaling, itching, weeping, crusting, and pain.

## 2024-03-15 ENCOUNTER — CONSULT (OUTPATIENT)
Dept: PAIN MEDICINE | Facility: CLINIC | Age: 39
End: 2024-03-15
Payer: COMMERCIAL

## 2024-03-15 VITALS
WEIGHT: 156 LBS | DIASTOLIC BLOOD PRESSURE: 84 MMHG | SYSTOLIC BLOOD PRESSURE: 144 MMHG | HEIGHT: 62 IN | BODY MASS INDEX: 28.71 KG/M2

## 2024-03-15 DIAGNOSIS — M54.12 CERVICAL RADICULOPATHY: Primary | ICD-10-CM

## 2024-03-15 DIAGNOSIS — M48.02 CERVICAL SPINAL STENOSIS: ICD-10-CM

## 2024-03-15 DIAGNOSIS — M47.812 CERVICAL SPONDYLOSIS: ICD-10-CM

## 2024-03-15 DIAGNOSIS — M54.40 LOW BACK PAIN WITH SCIATICA, SCIATICA LATERALITY UNSPECIFIED, UNSPECIFIED BACK PAIN LATERALITY, UNSPECIFIED CHRONICITY: ICD-10-CM

## 2024-03-15 PROCEDURE — 99244 OFF/OP CNSLTJ NEW/EST MOD 40: CPT | Performed by: ANESTHESIOLOGY

## 2024-03-15 NOTE — PROGRESS NOTES
Assessment  1. Cervical radiculopathy    2. Cervical spinal stenosis    3. Cervical spondylosis    4. Low back pain with sciatica, sciatica laterality unspecified, unspecified back pain laterality, unspecified chronicity        Plan  38-year-old female referred by Mary Silva PA-C, presenting for initial consultation regarding a 2-year history of neck pain that radiates into the left upper extremity with associated numbness, paresthesias, and subjective weakness.  She denies any trauma or inciting event.  Recently she has noted some lumbosacral back pain radiating into the left lower extremity to the foot with associated numbness and paresthesias.  She denies any inciting event regarding her lumbar and leg complaints.  MRI of the cervical spine demonstrates disc bulges eccentric to the left at C5-6 and C6-7 resulting in mild central with mild left foraminal stenosis at C5-6 and moderate to severe left foraminal stenosis at C6-7.  He does not have any imaging of the lumbar spine to review.  Physical therapy has provided some transient relief.  She does take gabapentin 400 mg 3 times daily for mood, however this is not really help with pain.  Ibuprofen and Flexeril provides some transient relief.  The patient's neck and left upper extremity symptoms appear to be radicular in nature stemming from C5-6 and C6-7 disc herniations.  The patient also appears to be experiencing left lower extremity radiculopathy although fortunately she is neurologically intact.    1.  I did offer the patient a C6-7 cervical epidural steroid injection.  She was unsure if she wanted to move forward with interventional therapy at this time, therefore she was provided literature regarding MELINDA to review  2.  Patient will continue with gabapentin as prescribed and may benefit from further titration upwards for her neuropathic complaints if psychiatry feels this is safe.  I will defer this to psychiatry as they are the prescribing service  3.   Patient will continue with physical therapy for her cervical complaints and we will also provide an order for physical therapy for her lumbar complaints  4.  Will order an x-ray of the lumbar spine  5.  I will follow-up with the patient in 6 weeks.  May consider MRI of the lumbar spine without contrast if low back and left lower extremity symptoms persist despite conservative treatment        My impressions and treatment recommendations were discussed in detail with the patient who verbalized understanding and had no further questions.  Discharge instructions were provided. I personally saw and examined the patient and I agree with the above discussed plan of care.    No orders of the defined types were placed in this encounter.    No orders of the defined types were placed in this encounter.      History of Present Illness    Zelda Mayo is a 38 y.o. female referred by Mary Silva PA-C, presenting for initial consultation regarding a 2-year history of neck pain that radiates into the left upper extremity with associated numbness, paresthesias, and subjective weakness.  He denies any right upper extremity symptoms, bladder or bowel incontinence, or balance issues. She denies any trauma or inciting event.  Recently she has noted some lumbosacral back pain radiating into the left lower extremity to the foot with associated numbness and paresthesias.  She denies any inciting event regarding her lumbar and leg complaints.  MRI of the cervical spine demonstrates disc bulges eccentric to the left at C5-6 and C6-7 resulting in mild central with mild left foraminal stenosis at C5-6 and moderate to severe left foraminal stenosis at C6-7.  He does not have any imaging of the lumbar spine to review.  Physical therapy has provided some transient relief.  She does take gabapentin 400 mg 3 times daily for mood, however this is not really help with pain.  Ibuprofen and Flexeril provides some transient relief.  The patient  rates her pain moderate to severe and rates the pain 8-10 out of 10.  The pain is constant and worse in the morning and afternoon.  The pain is described as aching and tingling.  The pain is increased with overhead activities with the left arm, bending, coughing, sneezing, and exercise.  The pain is alleviated somewhat with relaxation.    Other than as stated above, the patient denies any interval changes in medications, medical condition, mental condition, symptoms, or allergies since the last office visit.    I have personally reviewed and/or updated the patient's past medical history, past surgical history, family history, social history, current medications, allergies, and vital signs today.     Review of Systems   Constitutional:  Positive for chills and fever. Negative for unexpected weight change.   HENT:  Positive for sore throat. Negative for trouble swallowing.    Eyes:  Positive for visual disturbance.   Respiratory:  Positive for shortness of breath. Negative for wheezing.    Cardiovascular:  Positive for chest pain, palpitations and leg swelling.   Gastrointestinal:  Positive for abdominal pain, nausea and vomiting. Negative for constipation and diarrhea.   Endocrine: Negative for cold intolerance, heat intolerance and polydipsia.   Genitourinary:  Negative for difficulty urinating and frequency.   Musculoskeletal:  Positive for arthralgias, gait problem and myalgias. Negative for joint swelling.   Skin:  Positive for rash.   Neurological:  Positive for dizziness, numbness and headaches. Negative for seizures, syncope and weakness.   Hematological:  Does not bruise/bleed easily.   Psychiatric/Behavioral:  Positive for decreased concentration. Negative for dysphoric mood.         Anxiety, depression   All other systems reviewed and are negative.      Patient Active Problem List   Diagnosis    Moderate persistent asthma without complication    Anxiety    Depression    Epigastric pain    Neck pain on left  side    Tobacco dependence    Left arm numbness    Dizziness    Tinea corporis    Skin lesion    Prediabetes    Migraine    H. pylori infection    Annual physical exam       Past Medical History:   Diagnosis Date    ASCUS with positive high risk HPV cervical     HPV 18    Asthma     Chlamydia infection     Depression     IBS (irritable bowel syndrome)     Migraine     Varicose veins of both lower extremities        Past Surgical History:   Procedure Laterality Date    COLPOSCOPY         Family History   Problem Relation Age of Onset    Cirrhosis Father     No Known Problems Sister     Diabetes Brother     No Known Problems Brother     Colon cancer Maternal Uncle     No Known Problems Maternal Grandmother     No Known Problems Maternal Grandfather     No Known Problems Paternal Grandmother     No Known Problems Paternal Grandfather     Autism Son     Depression Son     No Known Problems Cousin        Social History     Occupational History    Not on file   Tobacco Use    Smoking status: Every Day     Current packs/day: 1.00     Average packs/day: 1 pack/day for 15.0 years (15.0 ttl pk-yrs)     Types: Cigarettes    Smokeless tobacco: Never   Vaping Use    Vaping status: Never Used   Substance and Sexual Activity    Alcohol use: Yes     Comment: social, not often    Drug use: Never    Sexual activity: Yes     Partners: Male     Birth control/protection: None     Comment: trying to have a baby       Current Outpatient Medications on File Prior to Visit   Medication Sig    albuterol (PROVENTIL HFA,VENTOLIN HFA) 90 mcg/act inhaler INHALE 2 PUFFS EVERY 6 (SIX) HOURS AS NEEDED FOR WHEEZING    clotrimazole (LOTRIMIN) 1 % cream Apply topically 2 (two) times a day    cyclobenzaprine (FLEXERIL) 10 mg tablet TAKE 1 TABLET (10 MG TOTAL) BY MOUTH 2 (TWO) TIMES A DAY AS NEEDED FOR MUSCLE SPASMS    diazepam (VALIUM) 2 mg tablet Take one tablet 15 minutes prior to MRI (Patient not taking: Reported on 2/16/2024)    dicyclomine  "(BENTYL) 10 mg capsule TAKE 2 CAPSULES BY MOUTH 3 (THREE) TIMES A DAY AS NEEDED FOR (NAUSEA, ABDOMINAL PAIN)    Eye Itch Relief 0.035 % ophthalmic solution INSTILL ONE DROP EN AMBOS OJOS DOS VECES AL ROGERIO JUDY INDICADO    fluticasone (FLONASE) 50 mcg/act nasal spray 2 sprays into each nostril continuous as needed for rhinitis    Fluticasone-Salmeterol (Advair) 100-50 mcg/dose inhaler INHALE 1 PUFF 2 (TWO) TIMES A DAY RINSE MOUTH AFTER USE.    gabapentin (NEURONTIN) 400 mg capsule FERN ELIZABETH CAPSULA POR VIA ORAL ROBEL VECES AL ROGERIO    Ibuprofen 200 MG CAPS Take 400 mg by mouth every 6 (six) hours as needed    mirtazapine (REMERON) 15 mg tablet     montelukast (SINGULAIR) 10 mg tablet TAKE 1 TABLET POR VIA ORAL DIARIAMENTE    omeprazole (PriLOSEC) 20 mg delayed release capsule Take 1 capsule (20 mg total) by mouth daily    omeprazole (PriLOSEC) 20 mg delayed release capsule Take 1 capsule (20 mg total) by mouth 2 (two) times a day for 14 days    PARoxetine (PAXIL) 40 MG tablet FERN 1 TABLETA POR VIA ORAL CADA MA?GOSIA    QUEtiapine (SEROquel) 100 mg tablet FERN 1 TABLETA POR VIA ORAL CADA MA?GOSIA    QUEtiapine (SEROquel) 300 mg tablet FERN 1 TABLETA POR VIA ORAL ANTES DE DORMIR EN LA NOCHE    rizatriptan (Maxalt) 10 mg tablet Take 1 tablet (10 mg total) by mouth as needed for migraine Take at the onset of migraine; if symptoms continue or return, may take another dose at least 2 hours after first dose. Take no more than 2 doses in a day.    tacrolimus (PROTOPIC) 0.1 % ointment Apply topically 2 (two) times a day Apply twice daily to areas of involvement. May burn with initial applications.    topiramate (Topamax) 25 mg tablet Take 2 tablets (50 mg total) by mouth daily at bedtime    vitamin B-12 (CYANOCOBALAMIN) 250 MCG TABS Take 1 tablet (250 mcg total) by mouth daily     No current facility-administered medications on file prior to visit.       No Known Allergies    Physical Exam    Ht 5' 2\" (1.575 m)   LMP 02/13/2024 " (Exact Date)   BMI 28.28 kg/m²     Constitutional: normal, well developed, well nourished, alert, in no distress and non-toxic and no overt pain behavior.  Eyes: anicteric  HEENT: grossly intact  Neck: supple, symmetric, trachea midline and no masses   Pulmonary:even and unlabored  Cardiovascular:No edema or pitting edema present  Skin:Normal without rashes or lesions and well hydrated  Psychiatric:Mood and affect appropriate  Neurologic:Cranial Nerves II-XII grossly intact  Musculoskeletal:normal gait.  Left cervical paraspinals and trapezii tender to palpation.  Bilateral biceps, triceps, and brachioradialis reflexes were 2/4 and symmetrical.  Negative Thomas's reflex bilaterally.  Bilateral upper extremity strength 5/5 in all muscle groups.  Sensation intact to light touch in C5 through T1 dermatomes bilaterally.  Positive Spurling's to the left.  Left lumbar paraspinals tender to palpation from L4-S1.  Bilateral SI joints nontender to palpation.  Bilateral trochanteric flares nontender to palpation.  Bilateral patellar and Achilles reflexes were 2/4 and symmetrical.  No clonus was noted bilaterally.  Bilateral lower extremity strength was 5/5 in all muscle groups.  Sensation intact to light touch in L3 thru S1 dermatomes bilaterally.  Negative straight leg raise bilaterally.  Negative Sanjay's and Gaenslen's test bilaterally.    Imaging    Study Result    Narrative & Impression   MRI CERVICAL SPINE WITHOUT CONTRAST     INDICATION: M54.2: Cervicalgia.   left neck and arm pain, numbness, tingling        COMPARISON: Cervical spine radiograph 11/10/2023.     TECHNIQUE:  Multiplanar, multisequence imaging of the cervical spine was performed. .        IMAGE QUALITY:  Diagnostic     FINDINGS:     ALIGNMENT: Straightened cervical spine. No compression fracture.  No subluxation.  No scoliosis.     MARROW SIGNAL:  Normal marrow signal is identified within the visualized bony structures.  No discrete marrow lesion.      CERVICAL AND VISUALIZED THORACIC CORD:  Normal signal within the visualized cord.     PREVERTEBRAL AND PARASPINAL SOFT TISSUES:  Normal.     VISUALIZED POSTERIOR FOSSA:  The visualized posterior fossa demonstrates no abnormal signal.     CERVICAL DISC SPACES: Multilevel disc desiccation and mild disc height loss, worse at C6-C7.     C1-C2: Normal.     C2-C3: Normal.     C3-C4: Tiny central disc protrusion. No significant canal stenosis or foraminal narrowing.     C4-C5: Normal.     C5-C6: Mild diffuse disc bulge, small left subarticular disc protrusion contacting left ventral cord. Mild canal stenosis. Mild left foraminal narrowing.     C6-C7: Mild diffuse disc bulge, small left foraminal disc protrusion. Mild canal stenosis. Moderate-to-severe left foraminal narrowing.     C7-T1: Normal.     UPPER THORACIC DISC SPACES:  Normal.     OTHER FINDINGS: Partially imaged mild mucosal thickening in left maxillary sinus.     IMPRESSION:     Multilevel degenerative changes of cervical spine with varying degrees of canal stenosis (mild C5-C6 and C6-C7) and foraminal narrowing (moderate-to-severe left C6-C7), as detailed above.     The study was marked in EPIC for significant notification.                 Workstation performed: [id number]       Study Result    Narrative & Impression         CERVICAL SPINE     INDICATION:   Cervicalgia.      COMPARISON: None available     VIEWS:  XR SPINE CERVICAL 2 OR 3 VW INJURY   Images: 3     FINDINGS:     No acute fracture or subluxation.      Straightening of the usual cervical lordosis.     Intervertebral disc heights are preserved. Near bridging osteophyte at the C6-7 level.     Normal prevertebral soft tissues.       Clear lung apices.     IMPRESSION:        No acute osseous abnormality.     Degenerative changes as above.     Electronically signed: 11/16/2023 11:09 AM Ayaz Green, MPH,MD

## 2024-03-18 ENCOUNTER — HOSPITAL ENCOUNTER (OUTPATIENT)
Dept: RADIOLOGY | Facility: HOSPITAL | Age: 39
Discharge: HOME | End: 2024-03-18
Payer: COMMERCIAL

## 2024-03-18 ENCOUNTER — TELEPHONE (OUTPATIENT)
Dept: PAIN MEDICINE | Facility: CLINIC | Age: 39
End: 2024-03-18

## 2024-03-18 DIAGNOSIS — M54.40 LOW BACK PAIN WITH SCIATICA, SCIATICA LATERALITY UNSPECIFIED, UNSPECIFIED BACK PAIN LATERALITY, UNSPECIFIED CHRONICITY: ICD-10-CM

## 2024-03-18 DIAGNOSIS — M48.02 CERVICAL STENOSIS OF SPINAL CANAL: ICD-10-CM

## 2024-03-18 PROCEDURE — 72110 X-RAY EXAM L-2 SPINE 4/>VWS: CPT

## 2024-03-18 PROCEDURE — 72052 X-RAY EXAM NECK SPINE 6/>VWS: CPT

## 2024-03-18 NOTE — TELEPHONE ENCOUNTER
Caller: Zelda    Doctor: Maya    Reason for call: patient calling to schedule procedure,     Call back#: [phone]

## 2024-03-19 ENCOUNTER — APPOINTMENT (OUTPATIENT)
Dept: PHYSICAL THERAPY | Facility: REHABILITATION | Age: 39
End: 2024-03-19
Payer: COMMERCIAL

## 2024-03-19 DIAGNOSIS — M54.12 CERVICAL RADICULOPATHY: Primary | ICD-10-CM

## 2024-03-19 NOTE — TELEPHONE ENCOUNTER
Called patient scheduled procedure with cat Dye # [id number]- reviewed all instructions upon scheduling. Mailed copy to home

## 2024-03-19 NOTE — TELEPHONE ENCOUNTER
Called patient to schedule procedure.   She would like to speak with a nurse to explain the procedure more before scheduling.     Call was translated by the [place] bull tineo [place] #[id number]      Please call patient to explain further detail about procedure and let me know if she would like to move fwd.    Per Dr. Trejo's note this is for a C6-7 MANJIT

## 2024-03-20 ENCOUNTER — APPOINTMENT (OUTPATIENT)
Dept: LAB | Facility: CLINIC | Age: 39
End: 2024-03-20
Payer: COMMERCIAL

## 2024-03-20 DIAGNOSIS — Z11.3 SCREENING EXAMINATION FOR STD (SEXUALLY TRANSMITTED DISEASE): ICD-10-CM

## 2024-03-20 LAB — HBV SURFACE AG SER QL: NORMAL

## 2024-03-20 PROCEDURE — 86780 TREPONEMA PALLIDUM: CPT

## 2024-03-20 PROCEDURE — 87389 HIV-1 AG W/HIV-1&-2 AB AG IA: CPT

## 2024-03-20 PROCEDURE — 36415 COLL VENOUS BLD VENIPUNCTURE: CPT

## 2024-03-20 PROCEDURE — 86803 HEPATITIS C AB TEST: CPT

## 2024-03-20 PROCEDURE — 87340 HEPATITIS B SURFACE AG IA: CPT

## 2024-03-21 LAB
HCV AB S/CO SERPL IA: NON REACTIVE
HIV 1+2 AB+HIV1 P24 AG SERPL QL IA: NORMAL
HIV 2 AB SERPL QL IA: NORMAL
HIV1 AB SERPL QL IA: NORMAL
HIV1 P24 AG SERPL QL IA: NORMAL
SL AMB INTERPRETATION: NORMAL
TREPONEMA PALLIDUM IGG+IGM AB [PRESENCE] IN SERUM OR PLASMA BY IMMUNOASSAY: NORMAL

## 2024-03-26 ENCOUNTER — APPOINTMENT (OUTPATIENT)
Dept: PHYSICAL THERAPY | Facility: REHABILITATION | Age: 39
End: 2024-03-26
Payer: COMMERCIAL

## 2024-04-02 ENCOUNTER — HOSPITAL ENCOUNTER (OUTPATIENT)
Dept: RADIOLOGY | Facility: CLINIC | Age: 39
Discharge: HOME | End: 2024-04-02
Payer: COMMERCIAL

## 2024-04-02 VITALS
TEMPERATURE: 97.5 F | OXYGEN SATURATION: 99 % | DIASTOLIC BLOOD PRESSURE: 79 MMHG | SYSTOLIC BLOOD PRESSURE: 120 MMHG | RESPIRATION RATE: 16 BRPM | HEART RATE: 76 BPM

## 2024-04-02 DIAGNOSIS — M54.12 CERVICAL RADICULOPATHY: ICD-10-CM

## 2024-04-02 PROCEDURE — 62321 NJX INTERLAMINAR CRV/THRC: CPT | Performed by: ANESTHESIOLOGY

## 2024-04-02 RX ORDER — PAPAVERINE HCL 150 MG
10 CAPSULE, EXTENDED RELEASE ORAL ONCE
Status: COMPLETED | OUTPATIENT
Start: 2024-04-02 | End: 2024-04-02

## 2024-04-02 RX ADMIN — IOHEXOL 1 ML: 300 INJECTION, SOLUTION INTRAVENOUS at 14:48

## 2024-04-02 RX ADMIN — DEXAMETHASONE SODIUM PHOSPHATE 10 MG: 10 INJECTION, SOLUTION INTRAMUSCULAR; INTRAVENOUS at 14:49

## 2024-04-02 NOTE — DISCHARGE INSTRUCTIONS
Epidural Steroid Injection   WHAT YOU NEED TO KNOW:   An epidural steroid injection (MELINDA) is a procedure to inject steroid medicine into the epidural space. The epidural space is between your spinal cord and vertebrae. Steroids reduce inflammation and fluid buildup in your spine that may be causing pain. You may be given pain medicine along with the steroids.          ACTIVITY  Do not drive or operate machinery today.  No strenuous activity today - bending, lifting, etc.  You may resume normal activites starting tomorrow - start slowly and as tolerated.  You may shower today, but no tub baths or hot tubs.  You may have numbness for several hours from the local anesthetic. Please use caution and common sense, especially with weight-bearing activities.    CARE OF THE INJECTION SITE  If you have soreness or pain, apply ice to the area today (20 minutes on/20 minutes off).  Starting tomorrow, you may use warm, moist heat or ice if needed.  You may have an increase or change in your discomfort for 36-48 hours after your treatment.  Apply ice and continue with any pain medication you have been prescribed.  Notify the [facility] if you have any of the following: redness, drainage, swelling, headache, stiff neck or fever above 100°F.    SPECIAL INSTRUCTIONS  Our office will contact you in approximately 7 days for a progress report.    MEDICATIONS  Continue to take all routine medications.  Our office may have instructed you to hold some medications.    As no general anesthesia was used in today's procedure, you should not experience any side effects related to anesthesia.     If you are diabetic, the steroids used in today's injection may temporarily increase your blood sugar levels after the first few days after your injection. Please keep a close eye on your sugars and alert the doctor who manages your diabetes if your sugars are significantly high from your baseline or you are symptomatic.     If you have a  problem specifically related to your procedure, please call our office at [phone].  Problems not related to your procedure should be directed to your primary care physician.

## 2024-04-02 NOTE — H&P
History of Present Illness: The patient is a 38 y.o. female who presents with complaints of neck and arm pain.    Past Medical History:   Diagnosis Date    ASCUS with positive high risk HPV cervical     HPV 17    Asthma     Chlamydia infection     Depression     IBS (irritable bowel syndrome)     Migraine     Varicose veins of both lower extremities        Past Surgical History:   Procedure Laterality Date    COLPOSCOPY           Current Outpatient Medications:     albuterol (PROVENTIL HFA,VENTOLIN HFA) 90 mcg/act inhaler, INHALE 2 PUFFS EVERY 6 (SIX) HOURS AS NEEDED FOR WHEEZING, Disp: 18 g, Rfl: 3    clotrimazole (LOTRIMIN) 1 % cream, Apply topically 2 (two) times a day, Disp: 45 g, Rfl: 1    cyclobenzaprine (FLEXERIL) 10 mg tablet, TAKE 1 TABLET (10 MG TOTAL) BY MOUTH 2 (TWO) TIMES A DAY AS NEEDED FOR MUSCLE SPASMS, Disp: 30 tablet, Rfl: 2    diazepam (VALIUM) 2 mg tablet, Take one tablet 15 minutes prior to MRI (Patient not taking: Reported on 2/16/2024), Disp: 1 tablet, Rfl: 0    dicyclomine (BENTYL) 10 mg capsule, TAKE 2 CAPSULES BY MOUTH 3 (THREE) TIMES A DAY AS NEEDED FOR (NAUSEA, ABDOMINAL PAIN), Disp: 90 capsule, Rfl: 0    Eye Itch Relief 0.035 % ophthalmic solution, INSTILL ONE DROP EN AMBOS OJOS DOS VECES AL ROGERIO JUDY INDICADO, Disp: , Rfl:     fluticasone (FLONASE) 50 mcg/act nasal spray, 2 sprays into each nostril continuous as needed for rhinitis, Disp: 2 Bottle, Rfl: 0    Fluticasone-Salmeterol (Advair) 100-50 mcg/dose inhaler, INHALE 1 PUFF 2 (TWO) TIMES A DAY RINSE MOUTH AFTER USE., Disp: 180 blister, Rfl: 1    gabapentin (NEURONTIN) 400 mg capsule, FERN ELIZABETH CAPSULA POR VIA ORAL ROBEL VECES AL ROGERIO, Disp: , Rfl:     Ibuprofen 200 MG CAPS, Take 400 mg by mouth every 6 (six) hours as needed, Disp: , Rfl:     mirtazapine (REMERON) 15 mg tablet, , Disp: , Rfl:     montelukast (SINGULAIR) 10 mg tablet, TAKE 1 TABLET POR VIA ORAL DIARIAMENTE, Disp: 180 tablet, Rfl: 0    omeprazole (PriLOSEC) 20 mg delayed  release capsule, Take 1 capsule (20 mg total) by mouth daily, Disp: 30 capsule, Rfl: 2    omeprazole (PriLOSEC) 20 mg delayed release capsule, Take 1 capsule (20 mg total) by mouth 2 (two) times a day for 14 days, Disp: 28 capsule, Rfl: 0    PARoxetine (PAXIL) 40 MG tablet, FERN 1 TABLETA POR VIA ORAL CADA MA?GOSIA, Disp: , Rfl:     QUEtiapine (SEROquel) 100 mg tablet, FERN 1 TABLETA POR VIA ORAL CADA MA?GOSIA, Disp: , Rfl:     QUEtiapine (SEROquel) 300 mg tablet, FERN 1 TABLETA POR VIA ORAL ANTES DE DORMIR EN LA NOCHE, Disp: , Rfl:     rizatriptan (Maxalt) 10 mg tablet, Take 1 tablet (10 mg total) by mouth as needed for migraine Take at the onset of migraine; if symptoms continue or return, may take another dose at least 2 hours after first dose. Take no more than 2 doses in a day., Disp: 10 tablet, Rfl: 3    tacrolimus (PROTOPIC) 0.1 % ointment, Apply topically 2 (two) times a day Apply twice daily to areas of involvement. May burn with initial applications., Disp: 100 g, Rfl: 3    topiramate (Topamax) 25 mg tablet, Take 2 tablets (50 mg total) by mouth daily at bedtime, Disp: 60 tablet, Rfl: 3    vitamin B-12 (CYANOCOBALAMIN) 250 MCG TABS, Take 1 tablet (250 mcg total) by mouth daily, Disp: 90 tablet, Rfl: 1    No Known Allergies    Physical Exam:   Vitals:    04/02/24 1424   BP: 113/77   Pulse: 68   Resp: 18   Temp: 97.5 °F (36.4 °C)   SpO2: 98%     General: Awake, Alert, Oriented x 3, Mood and affect appropriate  Respiratory: Respirations even and unlabored  Cardiovascular: Peripheral pulses intact; no edema  Musculoskeletal Exam: Normal gait    ASA Score: 2    Patient/Chart Verification  Patient ID Verified: Verbal  ID Band Applied: No  Consents Confirmed: Procedural  H&P( within 30 days) Verified: To be obtained in the Pre-Procedure area  Interval H&P(within 24 hr) Complete (required for Outpatients and Surgery Admit only): To be obtained in the Pre-Procedure area  Allergies Reviewed: Yes  Anticoag/NSAID  held?: No  Currently on antibiotics?: No  Pregnancy denied?: Yes  Pre-op Lab/Test Results Available: In chart  Pregnancy Lab Collected: N/A comment    Assessment:   1. Cervical radiculopathy        Plan: C6-7 MANJIT

## 2024-04-04 ENCOUNTER — TELEPHONE (OUTPATIENT)
Age: 39
End: 2024-04-04

## 2024-04-04 NOTE — TELEPHONE ENCOUNTER
Agree with RN recommendations.  No further recommendations at this time.  This does not appear to be a dural puncture headache and the patient should take her usual abortive medications for her migraines

## 2024-04-04 NOTE — TELEPHONE ENCOUNTER
S/W pt.  Pt s/p C6-7 MANJIT on 4/2.  She got a migraine 30-60 minutes after the procedure.  Her headache is the same with laying and sitting.  She took her migraine medication which helped only a little.  Sleeping helps a little.  She took Pain Relief yesterday and that did not help.  Ice helps a little.  Her migraine is a 9-10/10.  Advised pt to seek treatment at the ER if the pian is too bad/excruciating and will get this message to JW.  She verbalized understanding.  Please advise.

## 2024-04-04 NOTE — TELEPHONE ENCOUNTER
Caller: pt    Doctor: stanislav    Reason for call: pt has had a migraine since the procedure she had on 4-2-24. Please advise.    Call back#: [phone]

## 2024-04-09 ENCOUNTER — TELEPHONE (OUTPATIENT)
Dept: PAIN MEDICINE | Facility: CLINIC | Age: 39
End: 2024-04-09

## 2024-04-11 NOTE — TELEPHONE ENCOUNTER
Caller: ovidio Ndiaye  Doctor/office: Maya  #: [phone]    % of improvement: pt stated she is better  But she is still having the headaches      Pain Scale (1-10): no pain

## 2024-04-15 ENCOUNTER — OFFICE VISIT (OUTPATIENT)
Dept: INTERNAL MEDICINE CLINIC | Facility: CLINIC | Age: 39
End: 2024-04-15

## 2024-04-15 VITALS
RESPIRATION RATE: 18 BRPM | HEIGHT: 62 IN | TEMPERATURE: 97.9 F | OXYGEN SATURATION: 99 % | BODY MASS INDEX: 28.61 KG/M2 | WEIGHT: 155.5 LBS | HEART RATE: 93 BPM | DIASTOLIC BLOOD PRESSURE: 85 MMHG | SYSTOLIC BLOOD PRESSURE: 122 MMHG

## 2024-04-15 DIAGNOSIS — M54.42 CHRONIC BILATERAL LOW BACK PAIN WITH LEFT-SIDED SCIATICA: ICD-10-CM

## 2024-04-15 DIAGNOSIS — F32.89 OTHER DEPRESSION: ICD-10-CM

## 2024-04-15 DIAGNOSIS — M54.12 CERVICAL RADICULOPATHY: Primary | ICD-10-CM

## 2024-04-15 DIAGNOSIS — G43.E09 CHRONIC MIGRAINE WITH AURA WITHOUT STATUS MIGRAINOSUS, NOT INTRACTABLE: ICD-10-CM

## 2024-04-15 DIAGNOSIS — F41.9 ANXIETY: ICD-10-CM

## 2024-04-15 DIAGNOSIS — Z87.891 PERSONAL HISTORY OF NICOTINE DEPENDENCE: ICD-10-CM

## 2024-04-15 DIAGNOSIS — R10.13 EPIGASTRIC PAIN: ICD-10-CM

## 2024-04-15 DIAGNOSIS — G89.29 CHRONIC BILATERAL LOW BACK PAIN WITH LEFT-SIDED SCIATICA: ICD-10-CM

## 2024-04-15 DIAGNOSIS — J31.0 CHRONIC RHINITIS: ICD-10-CM

## 2024-04-15 DIAGNOSIS — A04.8 H. PYLORI INFECTION: ICD-10-CM

## 2024-04-15 DIAGNOSIS — M48.02 CERVICAL SPINAL STENOSIS: ICD-10-CM

## 2024-04-15 DIAGNOSIS — J45.40 MODERATE PERSISTENT ASTHMA WITHOUT COMPLICATION: ICD-10-CM

## 2024-04-15 PROBLEM — B35.4 TINEA CORPORIS: Status: RESOLVED | Noted: 2023-11-09 | Resolved: 2024-04-15

## 2024-04-15 PROBLEM — Z00.00 ANNUAL PHYSICAL EXAM: Status: RESOLVED | Noted: 2024-01-12 | Resolved: 2024-04-15

## 2024-04-15 PROCEDURE — 99214 OFFICE O/P EST MOD 30 MIN: CPT | Performed by: PHYSICIAN ASSISTANT

## 2024-04-15 RX ORDER — FLUTICASONE PROPIONATE 50 MCG
2 SPRAY, SUSPENSION (ML) NASAL DAILY
Qty: 16 G | Refills: 11 | Status: SHIPPED | OUTPATIENT
Start: 2024-04-15

## 2024-04-15 RX ORDER — OMEPRAZOLE 20 MG/1
20 CAPSULE, DELAYED RELEASE ORAL DAILY
Qty: 60 CAPSULE | Refills: 1 | Status: SHIPPED | OUTPATIENT
Start: 2024-04-15

## 2024-04-15 RX ORDER — MONTELUKAST SODIUM 10 MG/1
10 TABLET ORAL
Qty: 90 TABLET | Refills: 2 | Status: SHIPPED | OUTPATIENT
Start: 2024-04-15

## 2024-04-15 RX ORDER — METHOCARBAMOL 750 MG/1
750 TABLET, FILM COATED ORAL EVERY 6 HOURS PRN
Qty: 60 TABLET | Refills: 2 | Status: SHIPPED | OUTPATIENT
Start: 2024-04-15

## 2024-04-15 RX ORDER — BUDESONIDE AND FORMOTEROL FUMARATE DIHYDRATE 80; 4.5 UG/1; UG/1
2 AEROSOL RESPIRATORY (INHALATION) 2 TIMES DAILY
Qty: 10.2 G | Refills: 5 | Status: SHIPPED | OUTPATIENT
Start: 2024-04-15 | End: 2024-04-23 | Stop reason: ALTCHOICE

## 2024-04-15 RX ORDER — DICYCLOMINE HYDROCHLORIDE 10 MG/1
20 CAPSULE ORAL 4 TIMES DAILY PRN
Qty: 90 CAPSULE | Refills: 0 | Status: SHIPPED | OUTPATIENT
Start: 2024-04-15

## 2024-04-17 ENCOUNTER — PROCEDURE VISIT (OUTPATIENT)
Dept: DERMATOLOGY | Facility: CLINIC | Age: 39
End: 2024-04-17
Payer: COMMERCIAL

## 2024-04-17 VITALS
BODY MASS INDEX: 28.71 KG/M2 | HEIGHT: 62 IN | WEIGHT: 156 LBS | SYSTOLIC BLOOD PRESSURE: 120 MMHG | DIASTOLIC BLOOD PRESSURE: 64 MMHG | TEMPERATURE: 97.7 F

## 2024-04-17 DIAGNOSIS — L72.0 EPIDERMAL CYST: Primary | ICD-10-CM

## 2024-04-17 PROCEDURE — 11442 EXC FACE-MM B9+MARG 1.1-2 CM: CPT | Performed by: DERMATOLOGY

## 2024-04-17 PROCEDURE — 88304 TISSUE EXAM BY PATHOLOGIST: CPT | Performed by: STUDENT IN AN ORGANIZED HEALTH CARE EDUCATION/TRAINING PROGRAM

## 2024-04-17 PROCEDURE — 12051 INTMD RPR FACE/MM 2.5 CM/<: CPT | Performed by: DERMATOLOGY

## 2024-04-17 NOTE — PROGRESS NOTES
PROCEDURE:  EXCISION WITH INTERMEDIATE LAYERED CLOSURE     Attending: / Dr. Eubanks   Assistant:  Dr. Clemente     Pre-Op Diagnosis: epidermal cyst   Post-Op Diagnosis: Same   Benign       Lesion Anatomic Location: right jaw   Pre-op size: 0.5 x 0.5 cm  Size of defect:  0.5 x 0.5 cm   Final repaired wound length:  1.0 cm    Written and verbal, witnessed informed consent was obtained. I discussed that excision is a method of removing lesions both benign and malignant lesions.  A portion of normal skin is often taken to ensure completeness of removal.  I reviewed that procedure will include numbing the area,  cutting around and under defect, undermining tissue, and closing the wound with sutures both inside and out.  These sutures are usually removed in 7 to 14 days. Risks (bleeding, pain, infection, scarring, recurrence) and benefits discussed. It was discussed with patient that every effort is made to minimize scar, but scarring is influenced also by extrinsic factor such as location, age and genetics.     Time Out: performed:  yes  Correct patient: yes.   Correct site per Clinic Report: yes  Correct site per Patient Report: yes    LOCAL ANESTHESIA: 3:1 1% xylocaine with epi and 1-100,000 buffered    DESCRIPTION OF PROCEDURE: The patient was brought back into the procedure room, anesthetized locally, prepped and draped in the usual fashion. Using a #15 blade with a scalpel, the lesion was excised in elliptical fashion. The wound was  undermined in the  fascial plane. Hemostasis was achieved with light electrocoagulation. Purpose of undermining was to decrease wound tension and facilitate closure.    The wound was closed with subcutaneous sutures as follows:    Deep suture:6-0 Vicryl      Epidermal edge closure was accomplished with superficial sutures as follows:    Superficial suture: 6-0 Ethilon  Superficial suture type: Interrupted     Estimated blood loss less than 3ml.    The patient tolerated the procedure  well without any complications. The wound was cleaned with sterile saline, dried off, surgical vaseline ointment was applied, and the wound was covered. A pressure dressing was applied for stabilization and light pressure. The patient was given detailed oral and written instructions on postoperative care as detailed in consent. The patient left in good medical condition.    POSTOP DISCUSSION DISCUSSION AND INSTRUCTIONS FOR PATIENT      Rationale for Procedure  A skin excision allows the dermatologist to remove a lesion. The procedure involves a local numbing medication and removing the entire lesion. Typically, the lesion is being removed because it is atypical, traumatized, or for significant appearance reasons.  The area will be open like a brush burn and allowed to heal.   There will be no sutures.Tissue is sent to pathologist who will reconfirm diagnosis and verify completeness of lesion removal.    Description of Procedure  We would like to perform a skin excision today.  A local anesthetic, similar to the kind that a dentist uses when filling a cavity, will be injected with a very small needle into the skin area to be sampled.  The injected skin and tissue underneath should “go to sleep” and become numbed so that no further pain should be felt.  A scalpel will be used to cut around the lesion and tissue will be submitted to pathology for examination.  Depending on the diagnosis the lesion will be excised with a certain amount of normal skin to help assure completeness of lesion removal.  The physician will discuss in advance the anticipated size and extent of removal.   Bleeding will occur, but it will stopped with direct pressure, sutures, and electrocautery.    Surgical “Vaseline-type” ointment will also applied after the procedure to help create a barrier between the wound and the outside world.      Risks and Potential Complications  The advantage of a skin excision is that it allows us to remove a problem  lesion quickly.  Although this usually permits the lesion to heal as soon as possible with the least scarring, there are some risks and potential complications that include but are not limited to the following:  Some bleeding is normal at time of procedure and some bleeding on gauze is normal  the first few days after surgery.  Profuse bleeding and bleeding with swelling and pain should be reported as detailed  below  Infection is uncommon in skin surgery.  Infection should be reported and is indicated by pain, redness, and discharge of purulent material.  Some dull to at time sharp pain could occur initially the day after surgery.  Persistent pain or increasing pain days after surgery is not expected and should be reported.  Every effort is made to minimize scar, but location, size, and genetics do play a role in scar appearance.  A surgical wound does not achieve its optimal appearance until 6 months.  There are several treatments available if scarring would be problematic including scar creams, silicone pad, laser and scar revision.  Skin discoloration can occur especially in people of color.  Its important to avoid sun on wound in first 6 months after surgery.  Treatment is available if pigment is problematic.  Incomplete removal of the lesion or recurrence of lesion can occur and this would then require further treatment and more surgery.  Nerve Damage/Numbness/Loss of Function is very rare, but is most likely to occur if lesion is large or if it is in a high risk location  Allergic Reaction to lidocaine is rare.  More commonly,  epinephrine is used  with the lidocaine.  Occasionally, epinephrine (aka adrenalin) may cause a brief  feeling of anxiety or jitteriness.  The person at the microscope  (pathologist) may provide additional information that was unexpected. This unexpected finding could provoke the need for additional treatment or evaluation.    What You Will Need to Do After the Procedure  Keep the area  clean and dry the first day. Try NOT to remove the bandage for the first day.  Gently clean the area with soap and water and apply Vaseline ointment (this is over the counter and not a prescription) to the excision  site for up to 2 weeks.    Apply a clean appropriately sized bandage to area.  Gauze and paper tape are recommended for sensitive skin.  Return for suture removal as instructed (generally 1 week for the face, 2 weeks for the body).  Take Acetaminophen (Tylenol) for discomfort, if no contraindications.  Do NOT take Ibuprofen or aspirin unless specifically told to do so by your Dermatologist because these medications can make bleeding worse.  Call our office immediately for signs of infection: fever, chills, increased redness, warmth, tenderness, discomfort/pain, or pus or foul smell coming from the wound.    If bleeding is noticed, place a clean cloth over the area and apply firm pressure for thirty minutes.  Check the wound ONLY after 30 minutes of direct pressure; do not cheat and sneak a peak, as that does not count.  If bleeding persists after 30 minutes of legitimate direct pressure, then try one more round of direct pressure for an additional 10 minutes to the area.  Should the bleeding become heavier or not stop after the second attempt, call [facility] directly at [phone] (SKIN) or, if after hours, go to your local Emergency Room/Emergency Department.    Scribe Attestation    I,:  Enedina Castaneda am acting as a scribe while in the presence of the attending physician.:       I,:  Estevan Eubanks MD personally performed the services described in this documentation    as scribed in my presence.:           Resident Dr. Garcia

## 2024-04-17 NOTE — PATIENT INSTRUCTIONS
POSTOP DISCUSSION DISCUSSION AND INSTRUCTIONS FOR PATIENT      Rationale for Procedure  A skin excision allows the dermatologist to remove a lesion. The procedure involves a local numbing medication and removing the entire lesion. Typically, the lesion is being removed because it is atypical, traumatized, or for significant appearance reasons.  The area will be open like a brush burn and allowed to heal.   There will be no sutures.Tissue is sent to pathologist who will reconfirm diagnosis and verify completeness of lesion removal.    Description of Procedure  We would like to perform a skin excision today.  A local anesthetic, similar to the kind that a dentist uses when filling a cavity, will be injected with a very small needle into the skin area to be sampled.  The injected skin and tissue underneath should “go to sleep” and become numbed so that no further pain should be felt.  A scalpel will be used to cut around the lesion and tissue will be submitted to pathology for examination.  Depending on the diagnosis the lesion will be excised with a certain amount of normal skin to help assure completeness of lesion removal.  The physician will discuss in advance the anticipated size and extent of removal.   Bleeding will occur, but it will stopped with direct pressure, sutures, and electrocautery.    Surgical “Vaseline-type” ointment will also applied after the procedure to help create a barrier between the wound and the outside world.      Risks and Potential Complications  The advantage of a skin excision is that it allows us to remove a problem lesion quickly.  Although this usually permits the lesion to heal as soon as possible with the least scarring, there are some risks and potential complications that include but are not limited to the following:  Some bleeding is normal at time of procedure and some bleeding on gauze is normal  the first few days after surgery.  Profuse bleeding and bleeding with swelling  and pain should be reported as detailed  below  Infection is uncommon in skin surgery.  Infection should be reported and is indicated by pain, redness, and discharge of purulent material.  Some dull to at time sharp pain could occur initially the day after surgery.  Persistent pain or increasing pain days after surgery is not expected and should be reported.  Every effort is made to minimize scar, but location, size, and genetics do play a role in scar appearance.  A surgical wound does not achieve its optimal appearance until 6 months.  There are several treatments available if scarring would be problematic including scar creams, silicone pad, laser and scar revision.  Skin discoloration can occur especially in people of color.  Its important to avoid sun on wound in first 6 months after surgery.  Treatment is available if pigment is problematic.  Incomplete removal of the lesion or recurrence of lesion can occur and this would then require further treatment and more surgery.  Nerve Damage/Numbness/Loss of Function is very rare, but is most likely to occur if lesion is large or if it is in a high risk location  Allergic Reaction to lidocaine is rare.  More commonly,  epinephrine is used  with the lidocaine.  Occasionally, epinephrine (aka adrenalin) may cause a brief  feeling of anxiety or jitteriness.  The person at the microscope  (pathologist) may provide additional information that was unexpected. This unexpected finding could provoke the need for additional treatment or evaluation.    What You Will Need to Do After the Procedure  Keep the area clean and dry the first day. Try NOT to remove the bandage for the first day.  Gently clean the area with soap and water and apply Vaseline ointment (this is over the counter and not a prescription) to the excision  site for up to 2 weeks.    Apply a clean appropriately sized bandage to area.  Gauze and paper tape are recommended for sensitive skin.  Return for suture  removal as instructed (generally 1 week for the face, 2 weeks for the body).  Take Acetaminophen (Tylenol) for discomfort, if no contraindications.  Do NOT take Ibuprofen or aspirin unless specifically told to do so by your Dermatologist because these medications can make bleeding worse.  Call our office immediately for signs of infection: fever, chills, increased redness, warmth, tenderness, discomfort/pain, or pus or foul smell coming from the wound.    If bleeding is noticed, place a clean cloth over the area and apply firm pressure for thirty minutes.  Check the wound ONLY after 30 minutes of direct pressure; do not cheat and sneak a peak, as that does not count.  If bleeding persists after 30 minutes of legitimate direct pressure, then try one more round of direct pressure for an additional 10 minutes to the area.  Should the bleeding become heavier or not stop after the second attempt, call [facility] directly at [phone] (SKIN) or, if after hours, go to your local Emergency Room/Emergency Department.

## 2024-04-18 DIAGNOSIS — E53.8 LOW SERUM VITAMIN B12: ICD-10-CM

## 2024-04-18 RX ORDER — CYANOCOBALAMIN (VITAMIN B-12) 250 MCG
250 TABLET ORAL DAILY
Qty: 90 TABLET | Refills: 1 | Status: SHIPPED | OUTPATIENT
Start: 2024-04-18

## 2024-04-19 PROBLEM — R20.0 LEFT ARM NUMBNESS: Status: RESOLVED | Noted: 2023-11-09 | Resolved: 2024-04-19

## 2024-04-19 PROBLEM — G89.29 CHRONIC BILATERAL LOW BACK PAIN WITH LEFT-SIDED SCIATICA: Status: ACTIVE | Noted: 2024-03-15

## 2024-04-19 PROBLEM — M54.42 CHRONIC BILATERAL LOW BACK PAIN WITH LEFT-SIDED SCIATICA: Status: ACTIVE | Noted: 2024-03-15

## 2024-04-19 PROBLEM — L98.9 SKIN LESION: Status: RESOLVED | Noted: 2023-11-09 | Resolved: 2024-04-19

## 2024-04-19 PROBLEM — M54.2 NECK PAIN ON LEFT SIDE: Chronic | Status: RESOLVED | Noted: 2019-07-18 | Resolved: 2024-04-19

## 2024-04-19 PROBLEM — J31.0 CHRONIC RHINITIS: Status: ACTIVE | Noted: 2024-04-19

## 2024-04-19 PROBLEM — R42 DIZZINESS: Status: RESOLVED | Noted: 2023-11-09 | Resolved: 2024-04-19

## 2024-04-19 PROBLEM — Z87.891 PERSONAL HISTORY OF NICOTINE DEPENDENCE: Status: ACTIVE | Noted: 2023-11-08

## 2024-04-19 PROCEDURE — 88304 TISSUE EXAM BY PATHOLOGIST: CPT | Performed by: STUDENT IN AN ORGANIZED HEALTH CARE EDUCATION/TRAINING PROGRAM

## 2024-04-19 NOTE — ASSESSMENT & PLAN NOTE
Normal CT abdomen and pelvis within the past 6 months. No improvement after two H. Pylori regimens and continuing omeprazole 20 mg daily. Recommend GI consult. Patient agreeable and referral placed. Continue omeprazole 20 mg daily. Anti-GERD diet.

## 2024-04-19 NOTE — ASSESSMENT & PLAN NOTE
Current complaints would be due to smoking. Normal lung exam today. Will obtain chest x-ray to evaluate further. If no improvement in symptoms at follow up with restarting medications and inhalers, will order PFTs. Restart Symbicort 80-4.5 mcg twice daily. Rinse mouth after use. Albuterol as needed.

## 2024-04-19 NOTE — PROGRESS NOTES
Name: Zelda Mayo      : 1985      MRN: [id number]  Encounter Provider: Mary Silva PA-C  Encounter Date: 4/15/2024   [facility]    Assessment & Plan     1. Cervical radiculopathy  Assessment & Plan:  MRI Cervical spine 24: Multilevel degenerative changes of cervical spine with varying degrees of canal stenosis (mild C5-C6 and C6-C7) and foraminal narrowing (moderate-to-severe left C6-C7).     Consulted with neurosurgery who recommended pain management consult for trial of injections for relief prior to considering surgical options.   Consulted with pain management. Received C6-7 MANJIT 24. No improvement in symptoms. She did not schedule a follow up with pain management yet. She is already taking gabapentin through her mental health provider. We could discuss increasing gabapentin in the future. Stop cyclobenzaprine. Start methocarbmol 750 mg every 6 hours as needed. Avoid NSAIDs due to GI complaints. Continue home stretches. Warm/cool compresses. Follow up with pain management.    Orders:  -     methocarbamol (Robaxin-750) 750 mg tablet; Take 1 tablet (750 mg total) by mouth every 6 (six) hours as needed for muscle spasms    2. Cervical spinal stenosis    3. Chronic bilateral low back pain with left-sided sciatica    4. Chronic migraine with aura without status migrainosus, not intractable  Assessment & Plan:  Recently had a brain MRI which was normally. Current regimen: topamax 50 mg daily at bedtime, Maxalt 10 mg as needed. Followed by neurology. Next scheduled follow up .       5. Epigastric pain  Assessment & Plan:  Normal CT abdomen and pelvis within the past 6 months. No improvement after two H. Pylori regimens and continuing omeprazole 20 mg daily. Recommend GI consult. Patient agreeable and referral placed. Continue omeprazole 20 mg daily. Anti-GERD diet.     Orders:  -     Ambulatory Referral to Gastroenterology; Future  -      dicyclomine (BENTYL) 10 mg capsule; Take 2 capsules (20 mg total) by mouth 4 (four) times a day as needed (diarrhea, abdminal pain)    6. H. pylori infection  Assessment & Plan:  See epigastric pain.    Orders:  -     Ambulatory Referral to Gastroenterology; Future  -     omeprazole (PriLOSEC) 20 mg delayed release capsule; Take 1 capsule (20 mg total) by mouth daily    7. Moderate persistent asthma without complication  Assessment & Plan:  Current complaints would be due to smoking. Normal lung exam today. Will obtain chest x-ray to evaluate further. If no improvement in symptoms at follow up with restarting medications and inhalers, will order PFTs. Restart Symbicort 80-4.5 mcg twice daily. Rinse mouth after use. Albuterol as needed.     Orders:  -     montelukast (SINGULAIR) 10 mg tablet; Take 1 tablet (10 mg total) by mouth daily at bedtime  -     budesonide-formoterol (Symbicort) 80-4.5 MCG/ACT inhaler; Inhale 2 puffs 2 (two) times a day Rinse mouth after use.  -     XR chest pa & lateral; Future; Expected date: 04/15/2024    8. Chronic rhinitis  Assessment & Plan:  She has been out of her allergy medications. Restart montelukast 10 mg daily and Flonase daily as needed.    Orders:  -     fluticasone (FLONASE) 50 mcg/act nasal spray; 2 sprays into each nostril daily    9. Anxiety  Assessment & Plan:  Current regimen: quetiapine, paroxetine, mirtazapine, and gabapentin. Follow up with .       10. Other depression    11. Personal history of nicotine dependence  Assessment & Plan:  Encouraged smoking cessation. Declines assistance.           Depression Screening and Follow-up Plan: Patient's depression screening was positive with a PHQ-9 score of 7. Patient assessed for underlying major depression. Brief counseling provided and recommend additional follow-up/re-evaluation next office visit. Continue regular follow-up with their mental health provider who is managing their mental health condition(s). Patient  with underlying depression and was advised to continue current medications as prescribed. Followed by . Recommend discussing further with them. Denies SI.    Tobacco Cessation Counseling: Tobacco cessation counseling was provided. The patient is sincerely urged to quit consumption of tobacco. She is not ready to quit tobacco. Medication options and side effects of medication discussed. Patient refused medication.         Subjective      Patient is a 38 year old female with a PMH of chronic neck and back pain, migraines, asthma, allergies, and prediabetes presenting for follow up.  States she is still having epigastric pain. Worst when she eats or drinks almost anything. Frequent reflux and indigestion. States she sometimes spits up stomach acid and bile. About 5 months ago she tested positive for H pylori. She did not have much relief in symptoms. Upon retest a month or so later she was still positive. She completed a second course of treatment. Still no improvement in symptoms.   Denies fever or chills.  Denies congestion, rhinorrhea, postnasal drip.  Denies sore throat.  She does have a productive cough, brown phlegm.  Admits to intermittent wheezing and shortness of breath.  Denies chest pain or palpitations.  Denies diarrhea or constipation.  She does states the epigastric pain is constant, but sometimes worse than others.  She has noticed sometimes it is worse after eating and drinking. She is still smoking, she says no more than 1 PPD and it depends on the day and stress level.  She does experience migraines.   She states that her headaches started at the age of 14 to 15 years old. Over the past year, they do occur more frequently. She notes that along with these headaches, she will have bright flashing lights in her vision before the headache started.  She notes that the pain is usually at the front of the head, back of the head, and on both sides of the head and is described as a pressure type pain.  The  patient has associated nausea, vomiting, photophobia, phonophobia, osmophobia, blurred vision, lightheadedness/dizziness, facial numbness, and paresthesias with these headaches.  She notes that bending over will make the headaches worse.  She notes that she has never had any previous neuroimaging at this time.  She was noted to have tried propranolol long-acting 60 mg in the past which did not seem to be helpful for her for prevention. She has previously tried ibuprofen and Fioricet for migraine relief. Minimal relief with ibuprofen, no relief with Fioricet. She did consult with neuro and has started new medications. She feels these medications have been helping. She also notes she has new eye glasses that also help with her migraines. States she did receive a cervical injection through pain management that she feels has been triggering her migraines. She also states it has not helped with her neck pain.   She does suffer from chronic neck and back pain. Her neck pain is worse. Left sided and radiates into left arm. This has been present for very long time.  She states it has gotten worse.  She also has numbness and tingling in the same locations.  She states the numbness and tingling is her left neck shoulder, arm, and now into her face.  Denies weakness.  They have tried various medications in the past. Denies involvement of the right neck and arm. She has tried Flexeril, but states she had to stop because it changed her mood. States she was angry and agitated often.   She does admit to a strong history of anxiety.  States she has had it for a long time, and it is severe.  She does have a counselor and psychiatrist that she sees at least once a month.  They did recently change her medications.  She feels the medications never help.  States she still has bad anxiety, she feels it is worsening over time.      Review of Systems   Constitutional:  Positive for appetite change (decreased). Negative for chills,  diaphoresis, fatigue, fever and unexpected weight change.   HENT:  Positive for congestion. Negative for hearing loss, postnasal drip, rhinorrhea, sinus pressure, sinus pain, sore throat, tinnitus and trouble swallowing.    Eyes:  Negative for visual disturbance.   Respiratory:  Positive for cough and wheezing. Negative for chest tightness and shortness of breath.    Cardiovascular:  Negative for chest pain, palpitations and leg swelling.   Gastrointestinal:  Negative for abdominal distention, abdominal pain, blood in stool, constipation, diarrhea, nausea and vomiting.   Endocrine: Negative for cold intolerance, heat intolerance, polydipsia, polyphagia and polyuria.   Genitourinary:  Negative for decreased urine volume, difficulty urinating, dysuria, flank pain, frequency and urgency.   Musculoskeletal:  Positive for arthralgias, myalgias, neck pain and neck stiffness. Negative for back pain, gait problem and joint swelling.   Skin:  Negative for rash.   Neurological:  Positive for numbness and headaches. Negative for dizziness, tremors, syncope, weakness and light-headedness.   Hematological:  Negative for adenopathy.   Psychiatric/Behavioral:  Positive for dysphoric mood and sleep disturbance. Negative for self-injury and suicidal ideas. The patient is nervous/anxious.        Current Outpatient Medications on File Prior to Visit   Medication Sig    albuterol (PROVENTIL HFA,VENTOLIN HFA) 90 mcg/act inhaler INHALE 2 PUFFS EVERY 6 (SIX) HOURS AS NEEDED FOR WHEEZING    clotrimazole (LOTRIMIN) 1 % cream Apply topically 2 (two) times a day    Eye Itch Relief 0.035 % ophthalmic solution INSTILL ONE DROP EN AMBOS OJOS DOS VECES AL ROGERIO JUDY INDICADO    gabapentin (NEURONTIN) 400 mg capsule FERN ELIZABETH CAPSULA POR VIA ORAL ROBEL VECES AL ROGERIO    mirtazapine (REMERON) 15 mg tablet     PARoxetine (PAXIL) 40 MG tablet FERN 1 TABLETA POR VIA ORAL CADA MA?GOSIA    QUEtiapine (SEROquel) 300 mg tablet FERN 1 TABLETA POR VIA ORAL  "ANTES DE DORMIR EN LA NOCHE    rizatriptan (Maxalt) 10 mg tablet Take 1 tablet (10 mg total) by mouth as needed for migraine Take at the onset of migraine; if symptoms continue or return, may take another dose at least 2 hours after first dose. Take no more than 2 doses in a day.    tacrolimus (PROTOPIC) 0.1 % ointment Apply topically 2 (two) times a day Apply twice daily to areas of involvement. May burn with initial applications.    topiramate (Topamax) 25 mg tablet Take 2 tablets (50 mg total) by mouth daily at bedtime    [DISCONTINUED] Ibuprofen 200 MG CAPS Take 400 mg by mouth every 6 (six) hours as needed       Objective     /85   Pulse 93   Temp 97.9 °F (36.6 °C) (Temporal)   Resp 18   Ht 5' 2\" (1.575 m)   Wt 70.5 kg (155 lb 8 oz)   LMP 04/10/2024   SpO2 99%   BMI 28.44 kg/m²     Physical Exam  Vitals and nursing note reviewed.   Constitutional:       General: She is awake. She is not in acute distress.     Appearance: Normal appearance. She is well-developed, well-groomed and overweight. She is not ill-appearing.   HENT:      Head: Normocephalic and atraumatic.      Right Ear: Tympanic membrane, ear canal and external ear normal.      Left Ear: Tympanic membrane, ear canal and external ear normal.      Nose: Nose normal.      Mouth/Throat:      Lips: Pink.      Mouth: Mucous membranes are moist.      Pharynx: Oropharynx is clear. Uvula midline. No oropharyngeal exudate or posterior oropharyngeal erythema.   Eyes:      General: Lids are normal. No scleral icterus.     Extraocular Movements: Extraocular movements intact.      Right eye: Normal extraocular motion and no nystagmus.      Left eye: Normal extraocular motion and no nystagmus.      Conjunctiva/sclera: Conjunctivae normal.      Pupils: Pupils are equal, round, and reactive to light.   Neck:      Vascular: No carotid bruit, hepatojugular reflux or JVD.   Cardiovascular:      Rate and Rhythm: Normal rate and regular rhythm.      Pulses: " Normal pulses.           Radial pulses are 2+ on the right side and 2+ on the left side.      Heart sounds: Normal heart sounds. No murmur heard.  Pulmonary:      Effort: Pulmonary effort is normal. No respiratory distress.      Breath sounds: Normal breath sounds and air entry. No decreased air movement. No decreased breath sounds, wheezing, rhonchi or rales.   Abdominal:      General: Abdomen is flat. Bowel sounds are normal. There is no distension.      Palpations: Abdomen is soft. There is no mass.      Tenderness: There is no abdominal tenderness. There is no right CVA tenderness, left CVA tenderness, guarding or rebound.      Hernia: No hernia is present.   Musculoskeletal:         General: Normal range of motion.      Cervical back: Neck supple.      Right lower leg: No edema.      Left lower leg: No edema.   Lymphadenopathy:      Cervical: No cervical adenopathy.   Skin:     General: Skin is warm.      Capillary Refill: Capillary refill takes less than 2 seconds.      Coloration: Skin is not jaundiced.      Findings: No rash.      Comments: Two small mildly erythematous plaques with central clearing and excoriated edges on right forearm   Neurological:      General: No focal deficit present.      Mental Status: She is alert and oriented to person, place, and time. Mental status is at baseline.      Cranial Nerves: Cranial nerves 2-12 are intact.      Sensory: Sensation is intact.      Motor: Motor function is intact.      Coordination: Coordination is intact.      Gait: Gait is intact.      Comments: Upper and lower extremity strength 5/5, equal and symmetric. Normal tone.   Psychiatric:         Attention and Perception: Attention normal.         Mood and Affect: Mood and affect normal.         Speech: Speech normal.         Behavior: Behavior normal. Behavior is cooperative.       Mary Silva PA-C

## 2024-04-19 NOTE — ASSESSMENT & PLAN NOTE
She has been out of her allergy medications. Restart montelukast 10 mg daily and Flonase daily as needed.

## 2024-04-19 NOTE — ASSESSMENT & PLAN NOTE
MRI Cervical spine 1/19/24: Multilevel degenerative changes of cervical spine with varying degrees of canal stenosis (mild C5-C6 and C6-C7) and foraminal narrowing (moderate-to-severe left C6-C7).     Consulted with neurosurgery who recommended pain management consult for trial of injections for relief prior to considering surgical options.   Consulted with pain management. Received C6-7 MANJIT 4/2/24. No improvement in symptoms. She did not schedule a follow up with pain management yet. She is already taking gabapentin through her mental health provider. We could discuss increasing gabapentin in the future. Stop cyclobenzaprine. Start methocarbmol 750 mg every 6 hours as needed. Avoid NSAIDs due to GI complaints. Continue home stretches. Warm/cool compresses. Follow up with pain management.

## 2024-04-19 NOTE — ASSESSMENT & PLAN NOTE
Recently had a brain MRI which was normally. Current regimen: topamax 50 mg daily at bedtime, Maxalt 10 mg as needed. Followed by neurology. Next scheduled follow up 6/12.

## 2024-04-23 ENCOUNTER — TELEPHONE (OUTPATIENT)
Dept: INTERNAL MEDICINE CLINIC | Facility: CLINIC | Age: 39
End: 2024-04-23

## 2024-04-23 DIAGNOSIS — J45.40 MODERATE PERSISTENT ASTHMA WITHOUT COMPLICATION: Primary | ICD-10-CM

## 2024-04-23 RX ORDER — FLUTICASONE PROPIONATE AND SALMETEROL 250; 50 UG/1; UG/1
1 POWDER RESPIRATORY (INHALATION) 2 TIMES DAILY
Qty: 60 BLISTER | Refills: 5 | Status: SHIPPED | OUTPATIENT
Start: 2024-04-23

## 2024-04-23 NOTE — TELEPHONE ENCOUNTER
Spoke with pharmacist from [facility]. Symbicort Brand or generic is not covered by insurance.     Please review alternate medications that are covered per pharmacist: Advair Diskus (regular) and Advair HFA.

## 2024-04-24 NOTE — PROGRESS NOTES
Assessment:  1. Cervical radiculopathy    2. Cervical spondylosis    3. Cervical spinal stenosis    4. Chronic left-sided low back pain, unspecified whether sciatica present        Plan:  Patient will be scheduled for repeat C6-7 cervical epidural steroid injection to address the inflammatory component of the patient's pain.  Complete risks and benefits including bleeding, infection, tissue reaction, nerve injury and allergic reaction were discussed. The patient was agreeable and verbalized an understanding  Patient encouraged to establish in physical therapy for cervical and lumbar complaints  Patient may continue gabapentin and methocarbamol as prescribed  Follow-up after procedure or sooner if needed    Please note that the patient is primarily [place] speaking and required interpretive services for this office visit. leydi Dave MA, who speaks fluent Telugu was available throughout the entire office visit and interpreted with the patient in agreement and verbalizing understanding.    History of Present Illness:    The patient is a 38 y.o. female last seen on 3/15/2024 who presents for a follow up office visit in regards to chronic neck pain that radiates into the left upper extremity with associated numbness and paresthesias and low back pain that radiates into the left lower extremity to the foot with associated numbness and paresthesias. Xray of the lumbar spine was completed and is normal. Patient is status post C6-7 cervical epidural steroid injection April 2, 2024 with 50% improvement of her pain for approximately 2 weeks.  She has not yet initiated in physical therapy.  She continues on gabapentin 400 mg 3 times a day for mood, methocarbamol 750 mg as needed and ibuprofen as needed with transient relief    Patient rates her pain a 7 out of 10 on the numeric pain rating scale.  Pain is constant at night    I have personally reviewed and/or updated the patient's past medical history, past surgical history,  family history, social history, current medications, allergies, and vital signs today.       Review of Systems:    Review of Systems   Respiratory:  Negative for shortness of breath.    Cardiovascular:  Negative for chest pain.   Gastrointestinal:  Positive for constipation, diarrhea and vomiting. Negative for nausea.   Musculoskeletal:  Negative for arthralgias, gait problem, joint swelling and myalgias.   Skin:  Negative for rash.   Neurological:  Positive for dizziness. Negative for seizures and weakness.   All other systems reviewed and are negative.        Past Medical History:   Diagnosis Date    ASCUS with positive high risk HPV cervical     HPV 18    Asthma     Chlamydia infection     Depression     IBS (irritable bowel syndrome)     Migraine     Varicose veins of both lower extremities        Past Surgical History:   Procedure Laterality Date    COLPOSCOPY         Family History   Problem Relation Age of Onset    Cirrhosis Father     No Known Problems Sister     Diabetes Brother     No Known Problems Brother     Colon cancer Maternal Uncle     No Known Problems Maternal Grandmother     No Known Problems Maternal Grandfather     No Known Problems Paternal Grandmother     No Known Problems Paternal Grandfather     Autism Son     Depression Son     No Known Problems Cousin        Social History     Occupational History    Not on file   Tobacco Use    Smoking status: Every Day     Current packs/day: 1.00     Average packs/day: 1 pack/day for 15.0 years (15.0 ttl pk-yrs)     Types: Cigarettes    Smokeless tobacco: Never   Vaping Use    Vaping status: Never Used   Substance and Sexual Activity    Alcohol use: Yes     Comment: social, not often    Drug use: Never    Sexual activity: Yes     Partners: Male     Birth control/protection: None     Comment: trying to have a baby         Current Outpatient Medications:     albuterol (PROVENTIL HFA,VENTOLIN HFA) 90 mcg/act inhaler, INHALE 2 PUFFS EVERY 6 (SIX) HOURS AS  NEEDED FOR WHEEZING, Disp: 18 g, Rfl: 3    clotrimazole (LOTRIMIN) 1 % cream, Apply topically 2 (two) times a day, Disp: 45 g, Rfl: 1    dicyclomine (BENTYL) 10 mg capsule, Take 2 capsules (20 mg total) by mouth 4 (four) times a day as needed (diarrhea, abdminal pain), Disp: 90 capsule, Rfl: 0    Eye Itch Relief 0.035 % ophthalmic solution, INSTILL ONE DROP EN AMBOS OJOS DOS VECES AL ROGERIO JUDY INDICADO, Disp: , Rfl:     fluticasone (FLONASE) 50 mcg/act nasal spray, 2 sprays into each nostril daily, Disp: 16 g, Rfl: 11    Fluticasone-Salmeterol (Advair Diskus) 250-50 mcg/dose inhaler, Inhale 1 puff 2 (two) times a day Rinse mouth after use., Disp: 60 blister, Rfl: 5    gabapentin (NEURONTIN) 400 mg capsule, FERN ELIZABETH CAPSULA POR VIA ORAL ROBEL VECES AL ROGERIO, Disp: , Rfl:     methocarbamol (Robaxin-750) 750 mg tablet, Take 1 tablet (750 mg total) by mouth every 6 (six) hours as needed for muscle spasms, Disp: 60 tablet, Rfl: 2    mirtazapine (REMERON) 15 mg tablet, , Disp: , Rfl:     montelukast (SINGULAIR) 10 mg tablet, Take 1 tablet (10 mg total) by mouth daily at bedtime, Disp: 90 tablet, Rfl: 2    omeprazole (PriLOSEC) 20 mg delayed release capsule, Take 1 capsule (20 mg total) by mouth daily, Disp: 60 capsule, Rfl: 1    PARoxetine (PAXIL) 40 MG tablet, FERN 1 TABLETA POR VIA ORAL CADA MA?GOISA, Disp: , Rfl:     QUEtiapine (SEROquel) 300 mg tablet, FERN 1 TABLETA POR VIA ORAL ANTES DE DORMIR EN LA NOCHE, Disp: , Rfl:     rizatriptan (Maxalt) 10 mg tablet, Take 1 tablet (10 mg total) by mouth as needed for migraine Take at the onset of migraine; if symptoms continue or return, may take another dose at least 2 hours after first dose. Take no more than 2 doses in a day., Disp: 10 tablet, Rfl: 3    tacrolimus (PROTOPIC) 0.1 % ointment, Apply topically 2 (two) times a day Apply twice daily to areas of involvement. May burn with initial applications., Disp: 100 g, Rfl: 3    topiramate (Topamax) 25 mg tablet, Take 2  "tablets (50 mg total) by mouth daily at bedtime, Disp: 60 tablet, Rfl: 3    vitamin B-12 (CYANOCOBALAMIN) 250 MCG TABS, Take 1 tablet (250 mcg total) by mouth daily, Disp: 90 tablet, Rfl: 1    No Known Allergies    Physical Exam:    /81   Pulse (!) 120   Ht 5' 2\" (1.575 m)   Wt 70.8 kg (156 lb)   LMP 04/10/2024   BMI 28.53 kg/m²     Constitutional:normal, well developed, well nourished, alert, in no distress and non-toxic and no overt pain behavior.  Eyes:anicteric  HEENT:grossly intact  Neck:supple, symmetric, trachea midline and no masses   Pulmonary:even and unlabored  Cardiovascular:No edema or pitting edema present  Skin:Normal without rashes or lesions and well hydrated  Psychiatric:Mood and affect appropriate  Neurologic:Cranial Nerves II-XII grossly intact  Musculoskeletal:normal gait. Positive spurlings to the left      Imaging  FL spine and pain procedure    (Results Pending)     Narrative & Impression  XR SPINE LUMBAR MINIMUM 4 VIEWS NON INJURY     INDICATION: M54.40: Lumbago with sciatica, unspecified side.     COMPARISON: None     FINDINGS:     No acute fracture. Intact pedicles.     Five non-rib-bearing lumbar vertebral bodies.     Normal alignment.     No significant degenerative changes.     Unremarkable soft tissues.     IMPRESSION:     No acute osseous abnormality.        Workstation performed: [id number]         Orders Placed This Encounter   Procedures    FL spine and pain procedure       "

## 2024-04-25 ENCOUNTER — OFFICE VISIT (OUTPATIENT)
Dept: PAIN MEDICINE | Facility: CLINIC | Age: 39
End: 2024-04-25
Payer: COMMERCIAL

## 2024-04-25 ENCOUNTER — HOSPITAL ENCOUNTER (OUTPATIENT)
Dept: RADIOLOGY | Facility: HOSPITAL | Age: 39
Discharge: HOME | End: 2024-04-25
Payer: COMMERCIAL

## 2024-04-25 VITALS
SYSTOLIC BLOOD PRESSURE: 132 MMHG | BODY MASS INDEX: 28.71 KG/M2 | HEIGHT: 62 IN | HEART RATE: 120 BPM | WEIGHT: 156 LBS | DIASTOLIC BLOOD PRESSURE: 81 MMHG

## 2024-04-25 DIAGNOSIS — M47.812 CERVICAL SPONDYLOSIS: ICD-10-CM

## 2024-04-25 DIAGNOSIS — M54.12 CERVICAL RADICULOPATHY: Primary | ICD-10-CM

## 2024-04-25 DIAGNOSIS — G89.29 CHRONIC LEFT-SIDED LOW BACK PAIN, UNSPECIFIED WHETHER SCIATICA PRESENT: ICD-10-CM

## 2024-04-25 DIAGNOSIS — M48.02 CERVICAL SPINAL STENOSIS: ICD-10-CM

## 2024-04-25 DIAGNOSIS — J45.40 MODERATE PERSISTENT ASTHMA WITHOUT COMPLICATION: ICD-10-CM

## 2024-04-25 DIAGNOSIS — M54.50 CHRONIC LEFT-SIDED LOW BACK PAIN, UNSPECIFIED WHETHER SCIATICA PRESENT: ICD-10-CM

## 2024-04-25 PROCEDURE — 99214 OFFICE O/P EST MOD 30 MIN: CPT | Performed by: NURSE PRACTITIONER

## 2024-04-25 PROCEDURE — 71046 X-RAY EXAM CHEST 2 VIEWS: CPT

## 2024-04-26 ENCOUNTER — APPOINTMENT (EMERGENCY)
Dept: RADIOLOGY | Facility: HOSPITAL | Age: 39
End: 2024-04-26
Payer: COMMERCIAL

## 2024-04-26 ENCOUNTER — CLINICAL SUPPORT (OUTPATIENT)
Dept: DERMATOLOGY | Facility: CLINIC | Age: 39
End: 2024-04-26

## 2024-04-26 ENCOUNTER — HOSPITAL ENCOUNTER (EMERGENCY)
Facility: HOSPITAL | Age: 39
Discharge: HOME | End: 2024-04-26
Attending: EMERGENCY MEDICINE
Payer: COMMERCIAL

## 2024-04-26 VITALS
SYSTOLIC BLOOD PRESSURE: 157 MMHG | RESPIRATION RATE: 18 BRPM | OXYGEN SATURATION: 98 % | DIASTOLIC BLOOD PRESSURE: 95 MMHG | WEIGHT: 156 LBS | BODY MASS INDEX: 28.53 KG/M2 | HEART RATE: 92 BPM | TEMPERATURE: 98.2 F

## 2024-04-26 DIAGNOSIS — N83.209 OVARIAN CYST: ICD-10-CM

## 2024-04-26 DIAGNOSIS — Z48.02 ENCOUNTER FOR REMOVAL OF SUTURES: Primary | ICD-10-CM

## 2024-04-26 DIAGNOSIS — R10.9 FLANK PAIN: Primary | ICD-10-CM

## 2024-04-26 DIAGNOSIS — D21.9 FIBROID: ICD-10-CM

## 2024-04-26 LAB
ALBUMIN SERPL BCP-MCNC: 4.4 G/DL (ref 3.5–5)
ALP SERPL-CCNC: 81 U/L (ref 34–104)
ALT SERPL W P-5'-P-CCNC: 15 U/L (ref 7–52)
ANION GAP SERPL CALCULATED.3IONS-SCNC: 9 MMOL/L (ref 4–13)
AST SERPL W P-5'-P-CCNC: 12 U/L (ref 13–39)
BASOPHILS # BLD AUTO: 0.05 THOUSANDS/ÂΜL (ref 0–0.1)
BASOPHILS NFR BLD AUTO: 1 % (ref 0–1)
BILIRUB SERPL-MCNC: 0.25 MG/DL (ref 0.2–1)
BILIRUB UR QL STRIP: NEGATIVE
BUN SERPL-MCNC: 10 MG/DL (ref 5–25)
CALCIUM SERPL-MCNC: 9.6 MG/DL (ref 8.4–10.2)
CHLORIDE SERPL-SCNC: 108 MMOL/L (ref 96–108)
CLARITY UR: NORMAL
CO2 SERPL-SCNC: 22 MMOL/L (ref 21–32)
COLOR UR: NORMAL
CREAT SERPL-MCNC: 0.67 MG/DL (ref 0.6–1.3)
EOSINOPHIL # BLD AUTO: 0.06 THOUSAND/ÂΜL (ref 0–0.61)
EOSINOPHIL NFR BLD AUTO: 1 % (ref 0–6)
ERYTHROCYTE [DISTWIDTH] IN BLOOD BY AUTOMATED COUNT: 14.3 % (ref 11.6–15.1)
EXT PREGNANCY TEST URINE: NEGATIVE
EXT. CONTROL: NORMAL
GFR SERPL CREATININE-BSD FRML MDRD: 111 ML/MIN/1.73SQ M
GLUCOSE SERPL-MCNC: 136 MG/DL (ref 65–140)
GLUCOSE UR STRIP-MCNC: NEGATIVE MG/DL
HCT VFR BLD AUTO: 43.9 % (ref 34.8–46.1)
HGB BLD-MCNC: 14.2 G/DL (ref 11.5–15.4)
HGB UR QL STRIP.AUTO: NEGATIVE
IMM GRANULOCYTES # BLD AUTO: 0.05 THOUSAND/UL (ref 0–0.2)
IMM GRANULOCYTES NFR BLD AUTO: 1 % (ref 0–2)
KETONES UR STRIP-MCNC: NEGATIVE MG/DL
LEUKOCYTE ESTERASE UR QL STRIP: NEGATIVE
LIPASE SERPL-CCNC: 18 U/L (ref 11–82)
LYMPHOCYTES # BLD AUTO: 2.75 THOUSANDS/ÂΜL (ref 0.6–4.47)
LYMPHOCYTES NFR BLD AUTO: 26 % (ref 14–44)
MCH RBC QN AUTO: 29.7 PG (ref 26.8–34.3)
MCHC RBC AUTO-ENTMCNC: 32.3 G/DL (ref 31.4–37.4)
MCV RBC AUTO: 92 FL (ref 82–98)
MONOCYTES # BLD AUTO: 0.71 THOUSAND/ÂΜL (ref 0.17–1.22)
MONOCYTES NFR BLD AUTO: 7 % (ref 4–12)
NEUTROPHILS # BLD AUTO: 6.98 THOUSANDS/ÂΜL (ref 1.85–7.62)
NEUTS SEG NFR BLD AUTO: 64 % (ref 43–75)
NITRITE UR QL STRIP: NEGATIVE
NRBC BLD AUTO-RTO: 0 /100 WBCS
PH UR STRIP.AUTO: 7.5 [PH]
PLATELET # BLD AUTO: 324 THOUSANDS/UL (ref 149–390)
PMV BLD AUTO: 9.5 FL (ref 8.9–12.7)
POTASSIUM SERPL-SCNC: 3.3 MMOL/L (ref 3.5–5.3)
PROT SERPL-MCNC: 7.5 G/DL (ref 6.4–8.4)
PROT UR STRIP-MCNC: NEGATIVE MG/DL
RBC # BLD AUTO: 4.78 MILLION/UL (ref 3.81–5.12)
SODIUM SERPL-SCNC: 139 MMOL/L (ref 135–147)
SP GR UR STRIP.AUTO: 1.01 (ref 1–1.03)
UROBILINOGEN UR STRIP-ACNC: <2 MG/DL
WBC # BLD AUTO: 10.6 THOUSAND/UL (ref 4.31–10.16)

## 2024-04-26 PROCEDURE — 99024 POSTOP FOLLOW-UP VISIT: CPT | Performed by: DERMATOLOGY

## 2024-04-26 PROCEDURE — 81025 URINE PREGNANCY TEST: CPT

## 2024-04-26 PROCEDURE — 74177 CT ABD & PELVIS W/CONTRAST: CPT

## 2024-04-26 PROCEDURE — 96375 TX/PRO/DX INJ NEW DRUG ADDON: CPT

## 2024-04-26 PROCEDURE — 99285 EMERGENCY DEPT VISIT HI MDM: CPT | Performed by: EMERGENCY MEDICINE

## 2024-04-26 PROCEDURE — 85025 COMPLETE CBC W/AUTO DIFF WBC: CPT | Performed by: EMERGENCY MEDICINE

## 2024-04-26 PROCEDURE — 99284 EMERGENCY DEPT VISIT MOD MDM: CPT

## 2024-04-26 PROCEDURE — 80053 COMPREHEN METABOLIC PANEL: CPT | Performed by: EMERGENCY MEDICINE

## 2024-04-26 PROCEDURE — 81003 URINALYSIS AUTO W/O SCOPE: CPT

## 2024-04-26 PROCEDURE — 96374 THER/PROPH/DIAG INJ IV PUSH: CPT

## 2024-04-26 PROCEDURE — 36415 COLL VENOUS BLD VENIPUNCTURE: CPT

## 2024-04-26 PROCEDURE — 83690 ASSAY OF LIPASE: CPT | Performed by: EMERGENCY MEDICINE

## 2024-04-26 RX ORDER — KETOROLAC TROMETHAMINE 30 MG/ML
15 INJECTION, SOLUTION INTRAMUSCULAR; INTRAVENOUS ONCE
Status: COMPLETED | OUTPATIENT
Start: 2024-04-26 | End: 2024-04-26

## 2024-04-26 RX ORDER — DROPERIDOL 2.5 MG/ML
0.62 INJECTION, SOLUTION INTRAMUSCULAR; INTRAVENOUS ONCE
Status: COMPLETED | OUTPATIENT
Start: 2024-04-26 | End: 2024-04-26

## 2024-04-26 RX ORDER — ONDANSETRON 2 MG/ML
4 INJECTION INTRAMUSCULAR; INTRAVENOUS ONCE
Status: COMPLETED | OUTPATIENT
Start: 2024-04-26 | End: 2024-04-26

## 2024-04-26 RX ADMIN — KETOROLAC TROMETHAMINE 15 MG: 30 INJECTION, SOLUTION INTRAMUSCULAR; INTRAVENOUS at 19:27

## 2024-04-26 RX ADMIN — IOHEXOL 100 ML: 350 INJECTION, SOLUTION INTRAVENOUS at 20:41

## 2024-04-26 RX ADMIN — ONDANSETRON 4 MG: 2 INJECTION INTRAMUSCULAR; INTRAVENOUS at 19:27

## 2024-04-26 RX ADMIN — DROPERIDOL 0.62 MG: 2.5 INJECTION, SOLUTION INTRAMUSCULAR; INTRAVENOUS at 22:26

## 2024-04-26 NOTE — ED PROVIDER NOTES
"History  Chief Complaint   Patient presents with    Flank Pain     Pt states for the last 4 months she has been dealing with L flank pain.  Pt states this week the pain is worse.  Pt has appt for may 6 with GI.     HPI  Patient is a 38 y.o. female with history of no relevant PMH, no prior abdominal surgeries presenting to the emergency department for flank pain. Patient states that she has been having left sided flank pain for the past four months but it has become worse over the past week. She was recently treated with antibiotics for h pylori and is taking omeprazole for GERD. She also complains of having nausea but denies having any vomiting, she has been having intermittent diarrhea and constipation and feels like her left side is going to \"explode\" when she needs to have a bowel movement. She denies having any urinary symptoms or fever.      Prior to Admission Medications   Prescriptions Last Dose Informant Patient Reported? Taking?   Eye Itch Relief 0.035 % ophthalmic solution   Yes No   Sig: INSTILL ONE DROP EN AMBOS OJOS DOS VECES AL ROGERIO JUDY INDICADO   Fluticasone-Salmeterol (Advair Diskus) 250-50 mcg/dose inhaler   No No   Sig: Inhale 1 puff 2 (two) times a day Rinse mouth after use.   PARoxetine (PAXIL) 40 MG tablet   Yes No   Sig: FERN 1 TABLETA POR VIA ORAL CADA MA?GOSIA   QUEtiapine (SEROquel) 300 mg tablet   Yes No   Sig: FERN 1 TABLETA POR VIA ORAL ANTES DE DORMIR EN LA NOCHE   albuterol (PROVENTIL HFA,VENTOLIN HFA) 90 mcg/act inhaler  Self No No   Sig: INHALE 2 PUFFS EVERY 6 (SIX) HOURS AS NEEDED FOR WHEEZING   clotrimazole (LOTRIMIN) 1 % cream   No No   Sig: Apply topically 2 (two) times a day   dicyclomine (BENTYL) 10 mg capsule   No No   Sig: Take 2 capsules (20 mg total) by mouth 4 (four) times a day as needed (diarrhea, abdminal pain)   fluticasone (FLONASE) 50 mcg/act nasal spray   No No   Si sprays into each nostril daily   gabapentin (NEURONTIN) 400 mg capsule  Self Yes No   Sig: FERN " ELIZABETH CAPSULA POR VIA ORAL ROBEL VECES AL ROGERIO   methocarbamol (Robaxin-750) 750 mg tablet   No No   Sig: Take 1 tablet (750 mg total) by mouth every 6 (six) hours as needed for muscle spasms   mirtazapine (REMERON) 15 mg tablet  Self Yes No   montelukast (SINGULAIR) 10 mg tablet   No No   Sig: Take 1 tablet (10 mg total) by mouth daily at bedtime   omeprazole (PriLOSEC) 20 mg delayed release capsule   No No   Sig: Take 1 capsule (20 mg total) by mouth daily   rizatriptan (Maxalt) 10 mg tablet   No No   Sig: Take 1 tablet (10 mg total) by mouth as needed for migraine Take at the onset of migraine; if symptoms continue or return, may take another dose at least 2 hours after first dose. Take no more than 2 doses in a day.   tacrolimus (PROTOPIC) 0.1 % ointment   No No   Sig: Apply topically 2 (two) times a day Apply twice daily to areas of involvement. May burn with initial applications.   topiramate (Topamax) 25 mg tablet   No No   Sig: Take 2 tablets (50 mg total) by mouth daily at bedtime   vitamin B-12 (CYANOCOBALAMIN) 250 MCG TABS   No No   Sig: Take 1 tablet (250 mcg total) by mouth daily      Facility-Administered Medications: None       Past Medical History:   Diagnosis Date    ASCUS with positive high risk HPV cervical     HPV 18    Asthma     Chlamydia infection     Depression     IBS (irritable bowel syndrome)     Migraine     Varicose veins of both lower extremities        Past Surgical History:   Procedure Laterality Date    COLPOSCOPY         Family History   Problem Relation Age of Onset    Cirrhosis Father     No Known Problems Sister     Diabetes Brother     No Known Problems Brother     Colon cancer Maternal Uncle     No Known Problems Maternal Grandmother     No Known Problems Maternal Grandfather     No Known Problems Paternal Grandmother     No Known Problems Paternal Grandfather     Autism Son     Depression Son     No Known Problems Cousin      I have reviewed and agree with the history as  documented.    E-Cigarette/Vaping    E-Cigarette Use Never User      E-Cigarette/Vaping Substances    Nicotine No     THC No     CBD No     Flavoring No     Other No     Unknown No      Social History     Tobacco Use    Smoking status: Every Day     Current packs/day: 1.00     Average packs/day: 1 pack/day for 15.0 years (15.0 ttl pk-yrs)     Types: Cigarettes    Smokeless tobacco: Never   Vaping Use    Vaping status: Never Used   Substance Use Topics    Alcohol use: Yes     Comment: social, not often    Drug use: Never        Review of Systems   Constitutional:  Positive for chills. Negative for fever.   HENT:  Negative for congestion.    Eyes:  Negative for redness.   Respiratory:  Negative for cough and shortness of breath.    Cardiovascular:  Negative for chest pain and palpitations.   Gastrointestinal:  Positive for abdominal pain and diarrhea. Negative for vomiting.   Endocrine: Negative for polyuria.   Genitourinary:  Positive for flank pain. Negative for dysuria and hematuria.   Musculoskeletal:  Negative for arthralgias and back pain.   Skin:  Negative for rash.   Neurological:  Negative for light-headedness and headaches.   All other systems reviewed and are negative.      Physical Exam  ED Triage Vitals [04/26/24 1816]   Temperature Pulse Respirations Blood Pressure SpO2   98.2 °F (36.8 °C) 92 18 157/95 98 %      Temp Source Heart Rate Source Patient Position - Orthostatic VS BP Location FiO2 (%)   Temporal Monitor Sitting Left arm --      Pain Score       10 - Worst Possible Pain             Orthostatic Vital Signs  Vitals:    04/26/24 1816   BP: 157/95   Pulse: 92   Patient Position - Orthostatic VS: Sitting       Physical Exam  Vitals and nursing note reviewed.   Constitutional:       Appearance: Normal appearance.   HENT:      Head: Normocephalic and atraumatic.      Mouth/Throat:      Mouth: Mucous membranes are moist.   Eyes:      Conjunctiva/sclera: Conjunctivae normal.   Cardiovascular:      Rate  and Rhythm: Normal rate and regular rhythm.      Pulses: Normal pulses.      Heart sounds: Normal heart sounds.   Pulmonary:      Effort: Pulmonary effort is normal.      Breath sounds: Normal breath sounds.   Abdominal:      Palpations: Abdomen is soft.      Comments: No CVA tenderness. TTP left flank diffusely, primarily in LUQ. No rebound or guarding   Musculoskeletal:         General: No tenderness.      Cervical back: Neck supple.   Skin:     General: Skin is warm and dry.      Capillary Refill: Capillary refill takes less than 2 seconds.   Neurological:      General: No focal deficit present.      Mental Status: She is alert. Mental status is at baseline.   Psychiatric:         Mood and Affect: Mood normal.         ED Medications  Medications   ketorolac (TORADOL) injection 15 mg (15 mg Intravenous Given 4/26/24 1927)   ondansetron (ZOFRAN) injection 4 mg (4 mg Intravenous Given 4/26/24 1927)   iohexol (OMNIPAQUE) 350 MG/ML injection (MULTI-DOSE) 100 mL (100 mL Intravenous Given 4/26/24 2041)   droperidol (INAPSINE) injection 0.625 mg (0.625 mg Intravenous Given 4/26/24 2226)       Diagnostic Studies  Results Reviewed       Procedure Component Value Units Date/Time    UA w Reflex to Microscopic w Reflex to Culture [[id number]] Collected: 04/26/24 1941    Lab Status: Final result Specimen: Urine, Clean Catch Updated: 04/26/24 2046     Color, UA Light Yellow     Clarity, UA Hazy     Specific [place], UA 1.011     pH, UA 7.5     Leukocytes, UA Negative     Nitrite, UA Negative     Protein, UA Negative mg/dl      Glucose, UA Negative mg/dl      Ketones, UA Negative mg/dl      Urobilinogen, UA <2.0 mg/dl      Bilirubin, UA Negative     Occult Blood, UA Negative    Comprehensive metabolic panel [[id number]]  (Abnormal) Collected: 04/26/24 1849    Lab Status: Final result Specimen: Blood from Arm, Left Updated: 04/26/24 1954     Sodium 139 mmol/L      Potassium 3.3 mmol/L      Chloride 108 mmol/L      CO2 22 mmol/L       ANION GAP 9 mmol/L      BUN 10 mg/dL      Creatinine 0.67 mg/dL      Glucose 136 mg/dL      Calcium 9.6 mg/dL      AST 12 U/L      ALT 15 U/L      Alkaline Phosphatase 81 U/L      Total Protein 7.5 g/dL      Albumin 4.4 g/dL      Total Bilirubin 0.25 mg/dL      eGFR 111 ml/min/1.73sq m     Narrative:      National Kid[employer] Foundation guidelines for Chronic Kidney Disease (CKD):     Stage 1 with normal or high GFR (GFR > 90 mL/min/1.73 square meters)    Stage 2 Mild CKD (GFR = 60-89 mL/min/1.73 square meters)    Stage 3A Moderate CKD (GFR = 45-59 mL/min/1.73 square meters)    Stage 3B Moderate CKD (GFR = 30-44 mL/min/1.73 square meters)    Stage 4 Severe CKD (GFR = 15-29 mL/min/1.73 square meters)    Stage 5 End Stage CKD (GFR <15 mL/min/1.73 square meters)  Note: GFR calculation is accurate only with a steady state creatinine    Lipase [[id number]]  (Normal) Collected: 04/26/24 1849    Lab Status: Final result Specimen: Blood from Arm, Left Updated: 04/26/24 1954     Lipase 18 u/L     POCT pregnancy, urine [[id number]]  (Normal) Resulted: 04/26/24 1941    Lab Status: Final result Updated: 04/26/24 1941     EXT Preg Test, Ur Negative     Control Valid    CBC and differential [[id number]]  (Abnormal) Collected: 04/26/24 1849    Lab Status: Final result Specimen: Blood from Arm, Left Updated: 04/26/24 1902     WBC 10.60 Thousand/uL      RBC 4.78 Million/uL      Hemoglobin 14.2 g/dL      Hematocrit 43.9 %      MCV 92 fL      MCH 29.7 pg      MCHC 32.3 g/dL      RDW 14.3 %      MPV 9.5 fL      Platelets 324 Thousands/uL      nRBC 0 /100 WBCs      Segmented % 64 %      Immature Grans % 1 %      Lymphocytes % 26 %      Monocytes % 7 %      Eosinophils Relative 1 %      Basophils Relative 1 %      Absolute Neutrophils 6.98 Thousands/µL      Absolute Immature Grans 0.05 Thousand/uL      Absolute Lymphocytes 2.75 Thousands/µL      Absolute Monocytes 0.71 Thousand/µL      Eosinophils Absolute 0.06 Thousand/µL       Basophils Absolute 0.05 Thousands/µL                    CT abdomen pelvis with contrast   Final Result by Michael Hilton MD (04/26 2220)      No acute findings in the abdomen or pelvis.      No hydronephrosis.      4.7 x 2.9 cm left ovarian cyst. A pelvic ultrasound is recommended for further evaluation.      Trace pelvic free fluid.      Workstation performed: [id number]               Procedures  Procedures      ED Course  ED Course as of 04/27/24 2101 Fri Apr 26, 2024 1915 WBC(!): 10.60  Similar to prior   2001 PREGNANCY TEST URINE: Negative   2002 LIPASE: 18  Low concern for acute pancreatitis    2002 AST(!): 12  No acute elevation in LFTs    2048 UA w Reflex to Microscopic w Reflex to Culture  No evidence of UTI   2212 Patients pain improved, able to ambulate without difficulty. CT read pending.                              SBIRT 22yo+      Flowsheet Row Most Recent Value   Initial Alcohol Screen: US AUDIT-C     1. How often do you have a drink containing alcohol? 0 Filed at: 04/26/2024 1818   2. How many drinks containing alcohol do you have on a typical day you are drinking?  0 Filed at: 04/26/2024 1818   3a. Male UNDER 65: How often do you have five or more drinks on one occasion? 0 Filed at: 04/26/2024 1818   3b. FEMALE Any Age, or MALE 65+: How often do you have 4 or more drinks on one occassion? 0 Filed at: 04/26/2024 1818   Audit-C Score 0 Filed at: 04/26/2024 1818   MARCELLO: How many times in the past year have you...    Used an illegal drug or used a prescription medication for non-medical reasons? Never Filed at: 04/26/2024 1818                  Medical Decision Making  Amount and/or Complexity of Data Reviewed  Labs: ordered. Decision-making details documented in ED Course.  Radiology: ordered.    Risk  Prescription drug management.    Patient is a 38 y.o. female with PMH of GERD who presents to the ED with flank pain.    Vital signs stable. On exam left flank TTP.    History and physical exam  most consistent with musculoskeletal pain / IBD. However, differential diagnosis included but not limited to diverticulitis, pancreatitis, renal stone, pyelonephritis.     Plan: CBC, CMP, lipase, CT abdomen pelvis, US    View ED course above for further discussion on patient workup.     On review of previous records CT abdomen pelvis 11/1/23 with no acute abnormality.    All labs reviewed and utilized in the medical decision making process  All radiology studies independently viewed by me and interpreted by the radiologist.  I reviewed all testing with the patient.     Upon re-evaluation patients pain improved.    Disposition: This patient has elected to leave against medical advice. The patient is clinically sober and appears free from distracting injury. The patient appears to have intact insight, judgment, and reason. In my opinion, this patient has the capacity to make decisions.     The patient is presenting with abdominal pain with CT showing enlarged ovary. I am concerned that this may be ovarian torsion. The patient has verbalized understanding of my concerns. I have told the patient that, while the labs are reassuring, they still may have ovarian torsion.”     I had a discussion with the patient about their workup and results, and explained that they still may have ovarian torsion despite location of pain. I informed the patient that the next step in diagnosis and treatment would be pelvic ultrasound, and they verbalized understanding of this as well. I have told the patient that if they leave and has ovarian torsion, the patient’s condition may worsen. They may become critically ill and possibly become permanently disabled and die.   If you leave and this is ovarian torsion, this could affect your ability to have children.     I have offered to give the patient additional pain medication. I also offered alternatives to departing AMA such as assigning the patient a different provider or alternate workup  "pathway. The patient is refusing any further care, specifically ultrasound abdomen pelvis. I’m unable to convince the patient to stay. I have asked the patient to return as soon as possible to complete their evaluation, and also explained that they are welcome to return to the ED for further evaluation whenever they choose. I have asked the patient to follow up with their primary care doctor as soon as possible, or have referred them to a clinic if they do not have a primary care provider. I have answered all of their questions. Patient signed out AMA.       Portions of the record may have been created with voice recognition software. Occasional wrong word or \"sound a like\" substitutions may have occurred due to the inherent limitations of voice recognition software. Read the chart carefully and recognize, using context, where substitutions have occurred.         Disposition  Final diagnoses:   Flank pain   Ovarian cyst   Fibroid     Time reflects when diagnosis was documented in both MDM as applicable and the Disposition within this note       Time User Action Codes Description Comment    4/26/2024 10:39 PM María Mirza [R10.9] Flank pain     4/26/2024 10:40 PM María Mirza [N83.209] Ovarian cyst     4/26/2024 10:42 PM María Mirza [D21.9] Fibroid           ED Disposition       ED Disposition   AMA    Condition   --    Date/Time   Fri Apr 26, 2024 2239    Comment   Date: 4/26/2024  Patient: Zelda Mayo  Admitted: 4/26/2024  6:54 PM  Attending Provider: Capo Arora MD    Zelda Mayo or her authorized caregiver has made the decision for the patient to leave the emergency department against the advic e of her attending physician. She or her authorized caregiver has been informed and understands the inherent risks, including death, infertility, loss of ovary.  She or her authorized caregiver has decided to accept the responsibility for this decisi on. Zelda Mayo and all " necessary parties have been advised that she may return for further evaluation or treatment. Her condition at time of discharge was stable.  Zelda Mayo had current vital signs as follows:  /95 (BP Location: Left a rm)   Pulse 92   Temp 98.2 °F (36.8 °C) (Temporal)   Resp 18   Wt 70.8 kg (156 lb)   LMP 04/10/2024                Follow-up Information       Follow up With Specialties Details Why Contact Info    Mary Silva PA-C Physician Assistant   [address] [facility]  [address]  [place] [address] [phone]              Discharge Medication List as of 4/26/2024 10:42 PM        CONTINUE these medications which have NOT CHANGED    Details   albuterol (PROVENTIL HFA,VENTOLIN HFA) 90 mcg/act inhaler INHALE 2 PUFFS EVERY 6 (SIX) HOURS AS NEEDED FOR WHEEZING, Starting Wed 5/3/2023, Normal      clotrimazole (LOTRIMIN) 1 % cream Apply topically 2 (two) times a day, Starting Thu 11/9/2023, Normal      dicyclomine (BENTYL) 10 mg capsule Take 2 capsules (20 mg total) by mouth 4 (four) times a day as needed (diarrhea, abdminal pain), Starting Mon 4/15/2024, Normal      Eye Itch Relief 0.035 % ophthalmic solution INSTILL ONE DROP EN AMBOS OJOS DOS VECES AL ROGERIO JUDY INDICADO, Historical Med      fluticasone (FLONASE) 50 mcg/act nasal spray 2 sprays into each nostril daily, Starting Mon 4/15/2024, Normal      Fluticasone-Salmeterol (Advair Diskus) 250-50 mcg/dose inhaler Inhale 1 puff 2 (two) times a day Rinse mouth after use., Starting Tue 4/23/2024, Normal      gabapentin (NEURONTIN) 400 mg capsule FERN ELIZABETH CAPSULA POR VIA ORAL ROBEL VECES AL ROGERIO, Historical Med      methocarbamol (Robaxin-750) 750 mg tablet Take 1 tablet (750 mg total) by mouth every 6 (six) hours as needed for muscle spasms, Starting Mon 4/15/2024, Normal      mirtazapine (REMERON) 15 mg tablet Historical Med      montelukast (SINGULAIR) 10 mg tablet Take 1 tablet (10 mg total) by mouth daily at bedtime, Starting Mon 4/15/2024, Normal       omeprazole (PriLOSEC) 20 mg delayed release capsule Take 1 capsule (20 mg total) by mouth daily, Starting Mon 4/15/2024, Normal      PARoxetine (PAXIL) 40 MG tablet FERN 1 TABLETA POR VIA ORAL CADA MA?GOSIA, Historical Med      QUEtiapine (SEROquel) 300 mg tablet FERN 1 TABLETA POR VIA ORAL ANTES DE DORMIR EN LA NOCHE, Historical Med      rizatriptan (Maxalt) 10 mg tablet Take 1 tablet (10 mg total) by mouth as needed for migraine Take at the onset of migraine; if symptoms continue or return, may take another dose at least 2 hours after first dose. Take no more than 2 doses in a day., Starting Tue 1/30/2024, Normal      tacrolimus (PROTOPIC) 0.1 % ointment Apply topically 2 (two) times a day Apply twice daily to areas of involvement. May burn with initial applications., Starting Wed 2/21/2024, Normal      topiramate (Topamax) 25 mg tablet Take 2 tablets (50 mg total) by mouth daily at bedtime, Starting Tue 1/30/2024, Normal      vitamin B-12 (CYANOCOBALAMIN) 250 MCG TABS Take 1 tablet (250 mcg total) by mouth daily, Starting Thu 4/18/2024, Normal           No discharge procedures on file.    PDMP Review         Value Time User    PDMP Reviewed  Yes 2/5/2024  3:37 PM Mary Silva PA-C             ED Provider  Attending physically available and evaluated Zelda Mayo. I managed the patient along with the ED Attending.    Electronically Signed by           María Mirza DO  04/27/24 2101

## 2024-04-26 NOTE — ED ATTENDING ATTESTATION
4/26/2024  I, Conor Matt DO, saw and evaluated the patient. I have discussed the patient with the resident/non-physician practitioner and agree with the resident's/non-physician practitioner's findings, Plan of Care, and MDM as documented in the resident's/non-physician practitioner's note, except where noted. All available labs and Radiology studies were reviewed.  I was present for key portions of any procedure(s) performed by the resident/non-physician practitioner and I was immediately available to provide assistance.       At this point I agree with the current assessment done in the Emergency Department.  I have conducted an independent evaluation of this patient a history and physical is as follows:    Patient is a 38-year-old female with a history of cervical radiculopathy, chronic migraines, previous H. pylori infection, asthma, accompanied by her child's father, says for the last 3 to 4 months she has had some epigastric and left-sided abdominal pain which is relatively constant, sometimes worse with food but not all the time.  No fever, no chills, some occasional nausea, 2 episodes of nonbloody, nonbilious emesis today but does not normally vomit.  No dysuria, no hematuria, no vaginal bleeding or discharge.  No diarrhea, no constipation.  She has had H. pylori testing which has been positive, she has completed 2 treatment regimens for H. pylori and continues on omeprazole 20 mg daily.  She says that not has not seem to help much.  She saw her primary care physician on April 15 for this, was referred to gastroenterology, has an appointment at the beginning of May.  She has previously had an unremarkable abdomen and pelvis CT on November 1, 2023Unremarkable MRI of the brain February 7, 2024,  she presents today because last week her chronic pain has gotten worse.    General:  Patient is well-appearing  Head:  Atraumatic  Eyes:  Conjunctiva pink  ENT:  Mucous membranes are moist  Neck:   Supple  Cardiac:  S1-S2, without murmurs  Lungs:  Clear to auscultation bilaterally  Abdomen: Abdomen is soft, mild left upper abdominal tenderness, no tympany, no rigidity, no guarding, no CVA tenderness.  Extremities:  Normal range of motion  Neurologic:  Awake, fluent speech, normal comprehension, AAOx3  Skin:  Pink warm and dry  Psychiatric:  Alert, pleasant, cooperative      ED Course     Labs Reviewed   CBC AND DIFFERENTIAL - Abnormal       Result Value Ref Range Status    WBC 10.60 (*) 4.31 - 10.16 Thousand/uL Final    RBC 4.78  3.81 - 5.12 Million/uL Final    Hemoglobin 14.2  11.5 - 15.4 g/dL Final    Hematocrit 43.9  34.8 - 46.1 % Final    MCV 92  82 - 98 fL Final    MCH 29.7  26.8 - 34.3 pg Final    MCHC 32.3  31.4 - 37.4 g/dL Final    RDW 14.3  11.6 - 15.1 % Final    MPV 9.5  8.9 - 12.7 fL Final    Platelets 324  149 - 390 Thousands/uL Final    nRBC 0  /100 WBCs Final    Segmented % 64  43 - 75 % Final    Immature Grans % 1  0 - 2 % Final    Lymphocytes % 26  14 - 44 % Final    Monocytes % 7  4 - 12 % Final    Eosinophils Relative 1  0 - 6 % Final    Basophils Relative 1  0 - 1 % Final    Absolute Neutrophils 6.98  1.85 - 7.62 Thousands/µL Final    Absolute Immature Grans 0.05  0.00 - 0.20 Thousand/uL Final    Absolute Lymphocytes 2.75  0.60 - 4.47 Thousands/µL Final    Absolute Monocytes 0.71  0.17 - 1.22 Thousand/µL Final    Eosinophils Absolute 0.06  0.00 - 0.61 Thousand/µL Final    Basophils Absolute 0.05  0.00 - 0.10 Thousands/µL Final   COMPREHENSIVE METABOLIC PANEL - Abnormal    Sodium 139  135 - 147 mmol/L Final    Potassium 3.3 (*) 3.5 - 5.3 mmol/L Final    Chloride 108  96 - 108 mmol/L Final    CO2 22  21 - 32 mmol/L Final    ANION GAP 9  4 - 13 mmol/L Final    BUN 10  5 - 25 mg/dL Final    Creatinine 0.67  0.60 - 1.30 mg/dL Final    Comment: Standardized to IDMS reference method    Glucose 136  65 - 140 mg/dL Final    Comment: If the patient is fasting, the ADA then defines impaired fasting  glucose as > 100 mg/dL and diabetes as > or equal to 123 mg/dL.    Calcium 9.6  8.4 - 10.2 mg/dL Final    AST 12 (*) 13 - 39 U/L Final    ALT 15  7 - 52 U/L Final    Comment: Specimen collection should occur prior to Sulfasalazine administration due to the potential for falsely depressed results.     Alkaline Phosphatase 81  34 - 104 U/L Final    Total Protein 7.5  6.4 - 8.4 g/dL Final    Albumin 4.4  3.5 - 5.0 g/dL Final    Total Bilirubin 0.25  0.20 - 1.00 mg/dL Final    Comment: Use of this assay is not recommended for patients undergoing treatment with eltrombopag due to the potential for falsely elevated results.  N-acetyl-p-benzoquinone imine (metabolite of Acetaminophen) will generate erroneously low results in samples for patients that have taken an overdose of Acetaminophen.    eGFR 111  ml/min/1.73sq m Final    Narrative:     [employer] Kid[employer] guidelines for Chronic Kidney Disease (CKD):     Stage 1 with normal or high GFR (GFR > 90 mL/min/1.73 square meters)    Stage 2 Mild CKD (GFR = 60-89 mL/min/1.73 square meters)    Stage 3A Moderate CKD (GFR = 45-59 mL/min/1.73 square meters)    Stage 3B Moderate CKD (GFR = 30-44 mL/min/1.73 square meters)    Stage 4 Severe CKD (GFR = 15-29 mL/min/1.73 square meters)    Stage 5 End Stage CKD (GFR <15 mL/min/1.73 square meters)  Note: GFR calculation is accurate only with a steady state creatinine   LIPASE - Normal    Lipase 18  11 - 82 u/L Final   POCT PREGNANCY, URINE - Normal    EXT Preg Test, Ur Negative   Final    Control Valid   Final   UA W REFLEX TO MICROSCOPIC WITH REFLEX TO CULTURE    Color, UA Light Yellow   Final    Clarity, UA Hazy   Final    Specific Gravity, UA 1.011  1.003 - 1.030 Final    pH, UA 7.5  4.5, 5.0, 5.5, 6.0, 6.5, 7.0, 7.5, 8.0 Final    Leukocytes, UA Negative  Negative Final    Nitrite, UA Negative  Negative Final    Protein, UA Negative  Negative mg/dl Final    Glucose, UA Negative  Negative mg/dl Final    Ketones, UA  Negative  Negative mg/dl Final    Urobilinogen, UA <2.0  <2.0 mg/dl mg/dl Final    Bilirubin, UA Negative  Negative Final    Occult Blood, UA Negative  Negative Final     On reassessment there is no change in the findings but at this point the cause of the patient's symptoms is unclear.  She has no evidence of life-threatening ectopic pregnancy or metabolic abnormality.  CT abdomen and pelvis is pending to have the possibly of acute dental pathology.  If the CT is unremarkable, then I believe discharge home with outpatient follow-up with GI as previously arranged would be appropriate. Signed out to Dr. Arora      MEDICAL DECISION MAKING CODING    Patient presents with acute new problem with:  Threat to life or bodily function    Chronic conditions affecting care: as per HPI    COLLECTION AND INTERPRETATION OF DATA  Additional history obtained from: child's father  I reviewed prior external notes, including outpatient visits and imaging as noted above    I ordered each unique test  Tests reviewed personally by me:  Labs: see above        Social Determinants of Health:  Presentation to ED outside of business hours or on night shift          Critical Care Time  Procedures

## 2024-04-26 NOTE — PROGRESS NOTES
"Suture removal    Date/Time: 4/26/2024 10:45 AM    Performed by: Shlioh Wagner RN  Authorized by: Diana Ball MD  Universal Protocol:  Consent: Verbal consent obtained. Written consent not obtained.  Risks and benefits: risks, benefits and alternatives were discussed  Consent given by: patient  Time out: Immediately prior to procedure a \"time out\" was called to verify the correct patient, procedure, equipment, support staff and site/side marked as required.  Timeout called at: 4/26/2024 10:34 AM.  Patient understanding: patient states understanding of the procedure being performed  Patient consent: the patient's understanding of the procedure matches consent given  Procedure consent: procedure consent matches procedure scheduled  Relevant documents: relevant documents not present or verified  Test results: test results not available  Site marked: the operative site was not marked  Radiology Images displayed and confirmed. If images not available, report reviewed: imaging studies not available  Patient identity confirmed: verbally with patient      Patient location:  Clinic  Location:     Laterality:  Right    Location:  Head/neck    Head/neck location: jaw.  Procedure details:     Tools used:  Suture removal kit    Wound appearance:  No sign(s) of infection, good wound healing, clean, moist and pink    Number of sutures removed:  4  Post-procedure details:     Post-removal:  Band-Aid applied (Vaseline applied)    Patient tolerance of procedure:  Tolerated well, no immediate complications  Comments:      Patient advised to use Vaseline and a band aid for 1-2 more weeks to aid in the wound healing process. Patient stated distal area of wound was sore. Dr Wilson came in to examine the patient and said some pain is normal and that there is no sign of infection at this time.       Before suture removal     After suture removal   "

## 2024-04-27 NOTE — DISCHARGE INSTRUCTIONS
"You were seen in the emergency department today for abdominal / flank pain.    Your CT showed:  No acute findings in the abdomen or pelvis  No hydronephrosis.  4.7 x 2.9 cm left ovarian cyst. A pelvic ultrasound is recommended for further evaluation  Trace pelvic free fluid.\".    Follow up with your primary care provider.   Follow up with your OB/GYN regarding your ovarian cyst and fibroid.   Follow up with GI regarding your abdominal pain and GI symptoms.     Take your normal medications as prescribed.     Return to the emergency department for any new or concerning symptoms including worsening pain, vomiting.     You can return to the ED at any time to finish your evaluation for your flank pain and have an ultrasound completed as recommended.     Thank you for choosing [facility] for your care today.     "

## 2024-04-30 ENCOUNTER — OFFICE VISIT (OUTPATIENT)
Dept: INTERNAL MEDICINE CLINIC | Facility: CLINIC | Age: 39
End: 2024-04-30

## 2024-04-30 ENCOUNTER — HOSPITAL ENCOUNTER (OUTPATIENT)
Dept: RADIOLOGY | Facility: HOSPITAL | Age: 39
Discharge: HOME | End: 2024-04-30
Payer: COMMERCIAL

## 2024-04-30 VITALS
SYSTOLIC BLOOD PRESSURE: 134 MMHG | WEIGHT: 151.4 LBS | BODY MASS INDEX: 27.69 KG/M2 | HEART RATE: 98 BPM | TEMPERATURE: 98 F | OXYGEN SATURATION: 100 % | DIASTOLIC BLOOD PRESSURE: 89 MMHG

## 2024-04-30 DIAGNOSIS — N83.202 CYST OF LEFT OVARY: ICD-10-CM

## 2024-04-30 DIAGNOSIS — R10.13 EPIGASTRIC PAIN: Primary | ICD-10-CM

## 2024-04-30 PROCEDURE — 76856 US EXAM PELVIC COMPLETE: CPT

## 2024-04-30 PROCEDURE — 99214 OFFICE O/P EST MOD 30 MIN: CPT | Performed by: PHYSICIAN ASSISTANT

## 2024-04-30 PROCEDURE — 76830 TRANSVAGINAL US NON-OB: CPT

## 2024-04-30 RX ORDER — ONDANSETRON 4 MG/1
4 TABLET, FILM COATED ORAL EVERY 8 HOURS PRN
Qty: 30 TABLET | Refills: 0 | Status: SHIPPED | OUTPATIENT
Start: 2024-04-30

## 2024-04-30 RX ORDER — PANTOPRAZOLE SODIUM 40 MG/1
40 TABLET, DELAYED RELEASE ORAL DAILY
Qty: 30 TABLET | Refills: 0 | Status: SHIPPED | OUTPATIENT
Start: 2024-04-30

## 2024-05-02 DIAGNOSIS — N83.202 HEMORRHAGIC CYST OF LEFT OVARY: Primary | ICD-10-CM

## 2024-05-03 ENCOUNTER — TELEPHONE (OUTPATIENT)
Age: 39
End: 2024-05-03

## 2024-05-03 NOTE — TELEPHONE ENCOUNTER
Caller: ovidio    Doctor: stanislav    Reason for call: pt is calling to r/s her procedure b/c of another appt at the same times.    Call back#: [phone]

## 2024-05-04 PROBLEM — N83.202 CYST OF LEFT OVARY: Status: ACTIVE | Noted: 2024-05-04

## 2024-05-04 NOTE — PROGRESS NOTES
Name: Zelda Mayo      : 1985      MRN: [id number]  Encounter Provider: Mary Silva PA-C  Encounter Date: 2024   Encounter department: [facility]    Assessment & Plan     1. Epigastric pain  Assessment & Plan:  Pain was more so epigastric, now primarily LUQ. Reviewed CT abdomen pelvis from ER visit on : unremarkable other than a large left ovarian cyst. Could be contributing to her baseline pain and GI issues. Reviewed anti-GERD diet. Avoid alcohol and caffeine. Avoid NSAIDs. Stop omeprazole 20 mg daily and start pantoprazole 40 mg daily. May take Zofran 4 mg every 8 hours as needed. Will recheck CBC and BMP today. She was able to reschedule her GI consult for a sooner date, . ER precautions given.    Orders:  -     CBC and differential; Future  -     Basic metabolic panel; Future  -     pantoprazole (PROTONIX) 40 mg tablet; Take 1 tablet (40 mg total) by mouth daily  -     ondansetron (ZOFRAN) 4 mg tablet; Take 1 tablet (4 mg total) by mouth every 8 (eight) hours as needed for nausea or vomiting    2. Cyst of left ovary  Assessment & Plan:  CT abdomen pelvis 24: 4.7 x 2.9 cm left ovarian cyst.  Trace pelvic free fluid.    There was a concern for ovarian torsion, but patient refused pelvic US in the ED and signed out AMA. We discussed getting a pelvic US today, she was agreeable. ER precautions given. Follow up with GYN, regardless of results. Will send to ED if signs of torsion. Patient agreeable to plan.     Orders:  -     US pelvis complete w transvaginal; Future; Expected date: 2024           Subjective      Patient is a 38 year old female with a PMH of chronic neck and back pain, migraines, asthma, allergies, and prediabetes presenting for ER follow up. She originally presented to the ED on 24 for abdominal pain. She has been experiencing chronic epigastric pain, indigestion, and reflux since last year. She was positive for H. Pylori  and failed two treatment regimens. She recently scheduled with GI for a consult and was waiting for her appointment. Over the past week or so the pain has worsened and is now primarily her LUQ. Denies radiation. She can not describe the pain. Currently 8/10. States the pain is constant. Worse with intraabdominal pressure. For example it is worse when she tried to have a BM or urinate or cough. Admits to frequent nausea. States sometimes she vomits. Denies fever or chills. Denies chest pain or palpitations. Denies diarrhea or constipation. Denies hematochezia or melena. Denies dysuria, frequency, or urgency. Denies vaginal bleeding or discharge. Denies pelvic pain.   Declined  services. It was difficult to tell if she was actually having vomit or if it was reflux coming up or mucus.       Review of Systems   Constitutional:  Negative for appetite change, chills, diaphoresis, fatigue, fever and unexpected weight change.   Respiratory:  Negative for cough, shortness of breath and wheezing.    Cardiovascular:  Negative for chest pain, palpitations and leg swelling.   Gastrointestinal:  Positive for abdominal pain, nausea and vomiting. Negative for abdominal distention, blood in stool, constipation and diarrhea.   Genitourinary:  Negative for decreased urine volume, difficulty urinating, dysuria, flank pain, frequency, hematuria, pelvic pain, urgency, vaginal bleeding, vaginal discharge and vaginal pain.   Musculoskeletal:  Negative for arthralgias and myalgias.   Skin:  Negative for rash.   Neurological:  Negative for dizziness, weakness, light-headedness and headaches.   Hematological:  Negative for adenopathy.       Current Outpatient Medications on File Prior to Visit   Medication Sig    albuterol (PROVENTIL HFA,VENTOLIN HFA) 90 mcg/act inhaler INHALE 2 PUFFS EVERY 6 (SIX) HOURS AS NEEDED FOR WHEEZING    clotrimazole (LOTRIMIN) 1 % cream Apply topically 2 (two) times a day    dicyclomine (BENTYL) 10 mg  capsule Take 2 capsules (20 mg total) by mouth 4 (four) times a day as needed (diarrhea, abdminal pain)    Eye Itch Relief 0.035 % ophthalmic solution INSTILL ONE DROP EN AMBOS OJOS DOS VECES AL ROGERIO JUDY INDICADO    fluticasone (FLONASE) 50 mcg/act nasal spray 2 sprays into each nostril daily    Fluticasone-Salmeterol (Advair Diskus) 250-50 mcg/dose inhaler Inhale 1 puff 2 (two) times a day Rinse mouth after use.    gabapentin (NEURONTIN) 400 mg capsule FERN ELIZABETH CAPSULA POR VIA ORAL ROBEL VECES AL ROGERIO    methocarbamol (Robaxin-750) 750 mg tablet Take 1 tablet (750 mg total) by mouth every 6 (six) hours as needed for muscle spasms    mirtazapine (REMERON) 15 mg tablet     montelukast (SINGULAIR) 10 mg tablet Take 1 tablet (10 mg total) by mouth daily at bedtime    PARoxetine (PAXIL) 40 MG tablet FERN 1 TABLETA POR VIA ORAL CADA MA?GOSIA    QUEtiapine (SEROquel) 300 mg tablet FERN 1 TABLETA POR VIA ORAL ANTES DE DORMIR EN LA NOCHE    rizatriptan (Maxalt) 10 mg tablet Take 1 tablet (10 mg total) by mouth as needed for migraine Take at the onset of migraine; if symptoms continue or return, may take another dose at least 2 hours after first dose. Take no more than 2 doses in a day.    tacrolimus (PROTOPIC) 0.1 % ointment Apply topically 2 (two) times a day Apply twice daily to areas of involvement. May burn with initial applications.    topiramate (Topamax) 25 mg tablet Take 2 tablets (50 mg total) by mouth daily at bedtime    vitamin B-12 (CYANOCOBALAMIN) 250 MCG TABS Take 1 tablet (250 mcg total) by mouth daily       Objective     /89 (BP Location: Right arm, Patient Position: Sitting, Cuff Size: Standard)   Pulse 98   Temp 98 °F (36.7 °C) (Temporal)   Wt 68.7 kg (151 lb 6.4 oz)   LMP 04/10/2024   SpO2 100%   BMI 27.69 kg/m²     Physical Exam  Vitals and nursing note reviewed.   Constitutional:       General: She is awake. She is not in acute distress.     Appearance: Normal appearance. She is  well-developed, well-groomed and overweight. She is not ill-appearing.   HENT:      Head: Normocephalic and atraumatic.      Right Ear: Tympanic membrane, ear canal and external ear normal.      Left Ear: Tympanic membrane, ear canal and external ear normal.      Nose: Nose normal.      Mouth/Throat:      Lips: Pink.      Mouth: Mucous membranes are moist.      Pharynx: Oropharynx is clear. Uvula midline. No oropharyngeal exudate or posterior oropharyngeal erythema.   Eyes:      General: Lids are normal. No scleral icterus.     Extraocular Movements: Extraocular movements intact.      Right eye: Normal extraocular motion and no nystagmus.      Left eye: Normal extraocular motion and no nystagmus.      Conjunctiva/sclera: Conjunctivae normal.      Pupils: Pupils are equal, round, and reactive to light.   Neck:      Vascular: No carotid bruit, hepatojugular reflux or JVD.   Cardiovascular:      Rate and Rhythm: Normal rate and regular rhythm.      Pulses: Normal pulses.           Radial pulses are 2+ on the right side and 2+ on the left side.      Heart sounds: Normal heart sounds. No murmur heard.  Pulmonary:      Effort: Pulmonary effort is normal. No respiratory distress.      Breath sounds: Normal breath sounds and air entry. No decreased air movement. No decreased breath sounds, wheezing, rhonchi or rales.   Abdominal:      General: Abdomen is flat. Bowel sounds are normal. There is no distension.      Palpations: Abdomen is soft. There is no mass.      Tenderness: There is abdominal tenderness in the left upper quadrant. There is no right CVA tenderness, left CVA tenderness, guarding or rebound.      Hernia: No hernia is present.   Musculoskeletal:         General: Normal range of motion.      Cervical back: Neck supple.      Right lower leg: No edema.      Left lower leg: No edema.   Lymphadenopathy:      Cervical: No cervical adenopathy.   Skin:     General: Skin is warm.      Capillary Refill: Capillary  refill takes less than 2 seconds.      Coloration: Skin is not jaundiced.      Findings: No rash.   Neurological:      General: No focal deficit present.      Mental Status: She is alert and oriented to person, place, and time. Mental status is at baseline.      Cranial Nerves: Cranial nerves 2-12 are intact.      Sensory: Sensation is intact.      Motor: Motor function is intact.      Coordination: Coordination is intact.      Gait: Gait is intact.   Psychiatric:         Attention and Perception: Attention normal.         Mood and Affect: Mood and affect normal.         Speech: Speech normal.         Behavior: Behavior normal. Behavior is cooperative.     Mary Silva PA-C

## 2024-05-05 NOTE — ASSESSMENT & PLAN NOTE
CT abdomen pelvis 4/26/24: 4.7 x 2.9 cm left ovarian cyst.  Trace pelvic free fluid.    There was a concern for ovarian torsion, but patient refused pelvic US in the ED and signed out AMA. We discussed getting a pelvic US today, she was agreeable. ER precautions given. Follow up with GYN, regardless of results. Will send to ED if signs of torsion. Patient agreeable to plan.

## 2024-05-05 NOTE — ASSESSMENT & PLAN NOTE
Pain was more so epigastric, now primarily LUQ. Reviewed CT abdomen pelvis from ER visit on 4/26: unremarkable other than a large left ovarian cyst. Could be contributing to her baseline pain and GI issues. Reviewed anti-GERD diet. Avoid alcohol and caffeine. Avoid NSAIDs. Stop omeprazole 20 mg daily and start pantoprazole 40 mg daily. May take Zofran 4 mg every 8 hours as needed. Will recheck CBC and BMP today. She was able to reschedule her GI consult for a sooner date, 5/6. ER precautions given.

## 2024-05-06 ENCOUNTER — OFFICE VISIT (OUTPATIENT)
Dept: GASTROENTEROLOGY | Facility: CLINIC | Age: 39
End: 2024-05-06
Payer: COMMERCIAL

## 2024-05-06 VITALS
SYSTOLIC BLOOD PRESSURE: 110 MMHG | WEIGHT: 154 LBS | DIASTOLIC BLOOD PRESSURE: 72 MMHG | HEIGHT: 62 IN | HEART RATE: 80 BPM | BODY MASS INDEX: 28.34 KG/M2

## 2024-05-06 DIAGNOSIS — R19.4 CHANGE IN BOWEL HABIT: ICD-10-CM

## 2024-05-06 DIAGNOSIS — R19.8 IRREGULAR BOWEL HABITS: ICD-10-CM

## 2024-05-06 DIAGNOSIS — K21.9 GASTROESOPHAGEAL REFLUX DISEASE, UNSPECIFIED WHETHER ESOPHAGITIS PRESENT: ICD-10-CM

## 2024-05-06 DIAGNOSIS — R10.13 EPIGASTRIC PAIN: Primary | ICD-10-CM

## 2024-05-06 DIAGNOSIS — R11.2 NAUSEA AND VOMITING IN ADULT: ICD-10-CM

## 2024-05-06 DIAGNOSIS — A04.8 H. PYLORI INFECTION: ICD-10-CM

## 2024-05-06 PROCEDURE — 99244 OFF/OP CNSLTJ NEW/EST MOD 40: CPT | Performed by: PHYSICIAN ASSISTANT

## 2024-05-06 NOTE — H&P (VIEW-ONLY)
[facility] - Outpatient Follow-up Note  Zelda Mayo 38 y.o. female MRN: [id number]  Encounter: [id number]    ASSESSMENT AND PLAN:    1. Epigastric pain  2. Nausea and vomiting in adult  3. Gastroesophageal reflux disease, unspecified whether esophagitis present  4. H. pylori infection  Patient with history of H. pylori treated with quadruple therapy antibiotics and amoxicillin/levofloxacin triple therapy presenting with worsening epigastric pain, GERD symptoms, nausea and intermittent vomiting.  No prior EGD.  Unclear if still has H. pylori infection or not.  No recent H. pylori stool antigen/test of cure.  She was recently started on pantoprazole 40 mg by PCP.    Discussed with patient I am unsure her symptoms are related to her ovarian cyst.  I recommend she keep her follow-up appointment with gynecology as scheduled.    I recommend that she continue with pantoprazole 40 mg once daily in the morning.  We discussed that this can take a few weeks to reach peak effectiveness.  Will order EGD with biopsy to r/o esophagitis/ gastritis/ H pylori/ PUD. I educated patient on GERD diet and lifestyle including avoidance of trigger foods including fatty, greasy, spicy foods, chocolate, caffeine, alcohol, citrus foods, tomato sauces. We discussed  the importance of eating smaller frequent meals, not eating close to bedtime.  I provided a handout on GERD and GERD diet in the office today.    - Ambulatory Referral to Gastroenterology  - Colonoscopy; Future  - EGD; Future    5. Change in bowel habit  6. Irregular bowel habits  Patient also complains of irregular bowels, change in bowel habit for the last few months.  We will plan for colonoscopy at time of EGD.  Will also check CRP, TSH, celiac panel for completeness.  Encouraged adequate hydration.    - Colonoscopy; Future  - polyethylene glycol (GOLYTELY) 4000 mL solution; Take 4,000 mL by mouth once for 1 dose Take as directed by office  instructions prior to colonoscopy.  Dispense: 4000 mL; Refill: 0  - C-reactive protein; Future  - TSH, 3rd generation with Free T4 reflex; Future  - IgA; Future  - Tissue transglutaminase, IgA; Future    I obtained informed consent from the patient the risk/benefits/alternatives of the procedure/s were discussed with the patient.  Risks included, but not limited to, infection, bleeding, perforation, injury to organs in the abdomen, missed lesion and incomplete procedure were discussed.  Patient was agreeable and electronic signature was obtained.      Patient was instructed to call the office with any questions, concerns, new/ worsening/ persisting GI symptoms. Advised patient go to the ER with any severe or worsening abdominal pain, fevers/ chills, intractable N/V, chest pain, SOB, dizziness, lightheadedness, feeling something stuck in esophagus that will not go down. Patient expressed understanding and is in agreement with treatment plan.     Will plan to follow up after diagnostic tests   __________________________________________________________    SUBJECTIVE:      Patient is new to me and our office.  Patient with a past medical history of migraines, chronic rhinitis, asthma, ovarian cyst, back pain, anxiety and depression, prediabetes, personal history of nicotine dependence, history of H. pylori infection presents to the office today as a referral to discuss abdominal pain.  Patient was seen in the emergency room 4/26/2024 with complaints of flank pain, emergency room note reviewed.  Workup in ER notable for normal urinalysis.  CMP unremarkable aside from slightly low potassium.  Normal kidney and liver function.  Lipase normal.  CBC no anemia, slight leukocytosis.  She underwent CT scan which showed no acute findings in the abdomen or pelvis.  Large ovarian cyst seen.  It was recommended patient undergo pelvic ultrasound but patient ultimately signed out AGAINST MEDICAL ADVICE.    Her son is present for  "visit today.     Patient complains of epigastric pain x years, worsening over the last 6 months.   Can radiate to left side.   She describes the pain as burning and \"inflammation\".   The epigastric pain worsens after eating. No certain food triggers.  Pain on left side is worse with constipation.   There is associated nausea and intermittent vomiting, heartburn, gas, decreased appetite   Her bowels are very irregular last few months. Alternates between diarrhea and constipation, small caliber stools   Patient takes pantoprazole 40 mg once daily in the morning.  She is also recently prescribed dicyclomine 20 mg 4 times a day as needed and Zofran as needed. She notes dicyclomine made symptoms worse so she stopped   Patient denies any fevers/ chills, unintentional weight loss, black or bloody stools, heartburn, dysphagia, odynophagia.   Denies chest pain, SOB    Tobacco use- Current every day tobacco smoker  Alcohol use- occasional   NSAID use- She was using this often before but no longer as this makes her pain worse   No prior abdominal surgeries  No prior EGD   She notes a colonoscopy ~ 5 years ago, per patient this was done for bowel issues, and per patient there was \"lots of inflammation\". I do not have this report, patient unsure where this was completed    She denies first degree relative with CRC     Of note, patient was found to be positive for H. pylori 11/13/2023 with a positive stool antigen test.  She was prescribed quadruple therapy antibiotic regimen at that time.  Subsequently, patient was again positive for H. pylori 1/26/2024 despite antibiotics.  Per chart review she was then recommended treatment with omeprazole, amoxicillin, levofloxacin.  No test of cure completed.    She has an appointment with OB/GYN next week for ovarian cyst    Review of Systems   Constitutional:  Negative for chills and fever.   HENT:  Negative for ear pain and sore throat.    Eyes:  Negative for pain and visual disturbance. "   Respiratory:  Negative for cough and shortness of breath.    Cardiovascular:  Negative for chest pain and palpitations.   Gastrointestinal:  Positive for abdominal pain, constipation, diarrhea, nausea and vomiting.   Genitourinary:  Negative for dysuria and hematuria.   Musculoskeletal:  Negative for arthralgias and back pain.   Skin:  Negative for color change and rash.   Neurological:  Negative for seizures and syncope.   All other systems reviewed and are negative.         Historical Information   Past Medical History:   Diagnosis Date    ASCUS with positive high risk HPV cervical     HPV 18    Asthma     Chlamydia infection     Depression     IBS (irritable bowel syndrome)     Migraine     Varicose veins of both lower extremities      Past Surgical History:   Procedure Laterality Date    COLPOSCOPY       Social History   Social History     Substance and Sexual Activity   Alcohol Use Yes    Comment: social, not often     Social History     Substance and Sexual Activity   Drug Use Never     Social History     Tobacco Use   Smoking Status Every Day    Current packs/day: 1.00    Average packs/day: 1 pack/day for 15.0 years (15.0 ttl pk-yrs)    Types: Cigarettes   Smokeless Tobacco Never     Family History   Problem Relation Age of Onset    Cirrhosis Father     No Known Problems Sister     Diabetes Brother     No Known Problems Brother     Colon cancer Maternal Uncle     No Known Problems Maternal Grandmother     No Known Problems Maternal Grandfather     No Known Problems Paternal Grandmother     No Known Problems Paternal Grandfather     Autism Son     Depression Son     No Known Problems Cousin        Meds/Allergies       Current Outpatient Medications:     albuterol (PROVENTIL HFA,VENTOLIN HFA) 90 mcg/act inhaler    clotrimazole (LOTRIMIN) 1 % cream    dicyclomine (BENTYL) 10 mg capsule    Eye Itch Relief 0.035 % ophthalmic solution    fluticasone (FLONASE) 50 mcg/act nasal spray    Fluticasone-Salmeterol  (Advair Diskus) 250-50 mcg/dose inhaler    gabapentin (NEURONTIN) 400 mg capsule    methocarbamol (Robaxin-750) 750 mg tablet    mirtazapine (REMERON) 15 mg tablet    montelukast (SINGULAIR) 10 mg tablet    ondansetron (ZOFRAN) 4 mg tablet    pantoprazole (PROTONIX) 40 mg tablet    PARoxetine (PAXIL) 40 MG tablet    QUEtiapine (SEROquel) 300 mg tablet    rizatriptan (Maxalt) 10 mg tablet    topiramate (Topamax) 25 mg tablet    vitamin B-12 (CYANOCOBALAMIN) 250 MCG TABS    tacrolimus (PROTOPIC) 0.1 % ointment    No Known Allergies        Objective     Wt Readings from Last 3 Encounters:   05/06/24 69.9 kg (154 lb)   04/30/24 68.7 kg (151 lb 6.4 oz)   04/26/24 70.8 kg (156 lb)     Temp Readings from Last 3 Encounters:   04/30/24 98 °F (36.7 °C) (Temporal)   04/26/24 98.2 °F (36.8 °C) (Temporal)   04/17/24 97.7 °F (36.5 °C) (Temporal)     BP Readings from Last 3 Encounters:   05/06/24 110/72   04/30/24 134/89   04/26/24 157/95     Pulse Readings from Last 3 Encounters:   05/06/24 80   04/30/24 98   04/26/24 92          PHYSICAL EXAM:      Physical Exam  Vitals reviewed.   Constitutional:       General: She is not in acute distress.     Appearance: She is not toxic-appearing.   HENT:      Head: Normocephalic and atraumatic.   Eyes:      Extraocular Movements: Extraocular movements intact.      Conjunctiva/sclera: Conjunctivae normal.   Cardiovascular:      Rate and Rhythm: Normal rate and regular rhythm.   Pulmonary:      Effort: Pulmonary effort is normal. No respiratory distress.      Breath sounds: Normal breath sounds.   Abdominal:      General: Bowel sounds are normal.      Palpations: Abdomen is soft.      Tenderness: There is abdominal tenderness (minimal) in the epigastric area.   Musculoskeletal:         General: No swelling or tenderness.      Cervical back: Normal range of motion and neck supple.   Skin:     General: Skin is warm and dry.      Coloration: Skin is not jaundiced.   Neurological:      General:  No focal deficit present.      Mental Status: She is alert and oriented to person, place, and time. Mental status is at baseline.   Psychiatric:         Mood and Affect: Mood normal.         Behavior: Behavior normal.         Thought Content: Thought content normal.          Lab Results:   No visits with results within 1 Day(s) from this visit.   Latest known visit with results is:   Admission on 04/26/2024, Discharged on 04/26/2024   Component Date Value    WBC 04/26/2024 10.60 (H)     RBC 04/26/2024 4.78     Hemoglobin 04/26/2024 14.2     Hematocrit 04/26/2024 43.9     MCV 04/26/2024 92     MCH 04/26/2024 29.7     MCHC 04/26/2024 32.3     RDW 04/26/2024 14.3     MPV 04/26/2024 9.5     Platelets 04/26/2024 324     nRBC 04/26/2024 0     Segmented % 04/26/2024 64     Immature Grans % 04/26/2024 1     Lymphocytes % 04/26/2024 26     Monocytes % 04/26/2024 7     Eosinophils Relative 04/26/2024 1     Basophils Relative 04/26/2024 1     Absolute Neutrophils 04/26/2024 6.98     Absolute Immature Grans 04/26/2024 0.05     Absolute Lymphocytes 04/26/2024 2.75     Absolute Monocytes 04/26/2024 0.71     Eosinophils Absolute 04/26/2024 0.06     Basophils Absolute 04/26/2024 0.05     Sodium 04/26/2024 139     Potassium 04/26/2024 3.3 (L)     Chloride 04/26/2024 108     CO2 04/26/2024 22     ANION GAP 04/26/2024 9     BUN 04/26/2024 10     Creatinine 04/26/2024 0.67     Glucose 04/26/2024 136     Calcium 04/26/2024 9.6     AST 04/26/2024 12 (L)     ALT 04/26/2024 15     Alkaline Phosphatase 04/26/2024 81     Total Protein 04/26/2024 7.5     Albumin 04/26/2024 4.4     Total Bilirubin 04/26/2024 0.25     eGFR 04/26/2024 111     Lipase 04/26/2024 18     Color, UA 04/26/2024 Light Yellow     Clarity, UA 04/26/2024 Hazy     Specific [place], UA 04/26/2024 1.011     pH, UA 04/26/2024 7.5     Leukocytes, UA 04/26/2024 Negative     Nitrite, UA 04/26/2024 Negative     Protein, UA 04/26/2024 Negative     Glucose, UA 04/26/2024 Negative      Ketones, UA 04/26/2024 Negative     Urobilinogen, UA 04/26/2024 <2.0     Bilirubin, UA 04/26/2024 Negative     Occult Blood, UA 04/26/2024 Negative     EXT Preg Test, Ur 04/26/2024 Negative     Control 04/26/2024 Valid        Lab Results   Component Value Date    WBC 10.60 (H) 04/26/2024    HGB 14.2 04/26/2024    HCT 43.9 04/26/2024    MCV 92 04/26/2024     04/26/2024       Lab Results   Component Value Date    SODIUM 139 04/26/2024    K 3.3 (L) 04/26/2024     04/26/2024    CO2 22 04/26/2024    AGAP 9 04/26/2024    BUN 10 04/26/2024    CREATININE 0.67 04/26/2024    GLUC 136 04/26/2024    GLUF 108 (H) 11/09/2023    CALCIUM 9.6 04/26/2024    AST 12 (L) 04/26/2024    ALT 15 04/26/2024    ALKPHOS 81 04/26/2024    TP 7.5 04/26/2024    TBILI 0.25 04/26/2024    EGFR 111 04/26/2024       Radiology Results:   US pelvis complete w transvaginal    Result Date: 4/30/2024  Narrative: PELVIC ULTRASOUND, COMPLETE INDICATION: The patient is 38 years old. N83.202: Unspecified ovarian cyst, left side. COMPARISON: CT April 26, 2024, ultrasound December 27, 2022 TECHNIQUE: Transabdominal pelvic ultrasound was performed in sagittal and transverse planes with a curvilinear transducer. Additional transvaginal imaging was performed to better evaluate the endometrium and ovaries. Imaging included volumetric sweeps as  well as traditional still imaging technique. FINDINGS: UTERUS: The uterus is retroverted in position, measuring 7.1 x 4.5 x 5.6 cm. The uterus has a normal contour and echotexture. Small subserosal leiomyoma right lateral uterine body, 1 cm. Additional mixed echogenicity partly cystic looking structure in the left lower uterine segment. This has a similar size and appearance to prior  ultrasound. While this could represent a myoma with cystic degeneration, alternative etiology would be focal varix formation. The cervix appears within normal limits. ENDOMETRIUM: The endometrial echo complex has an AP caliber of  8.0 mm. Appearance within normal limits. OVARIES/ADNEXA: Right ovary: 1.6 x 1.0 x 1.1 cm. 0.9 mL. Ovarian Doppler flow is within normal limits. No suspicious ovarian or adnexal abnormality. Left ovary: 4.2 x 2.4 x 1.6 cm. 8.2 mL. Ovarian Doppler flow is within normal limits. On direct comparison to CT from a few days ago. The left ovary overall is normal in size and contains what appears to be a typical benign-appearing cyst with fishnet weblike appearance, most consistent with typical hemorrhagic cyst which is currently 2.4  x 1.1 x 1.6 cm., Correlating with the CT finding for general shape and size. OTHER: No free fluid or loculated fluid collections.     Impression: Hemorrhagic cyst left ovary correlating with CT finding. Ovary remains otherwise normal in position, size and appearance and contains color Doppler flow. Workstation performed: [id number]         CT abdomen pelvis with contrast    Result Date: 4/26/2024  Narrative: CT ABDOMEN AND PELVIS WITH IV CONTRAST INDICATION: left upper quadrant / flank pain. COMPARISON: CT abdomen pelvis dated November 1, 2023. TECHNIQUE: CT examination of the abdomen and pelvis was performed. Multiplanar 2D reformatted images were created from the source data. This examination, like all CT scans performed in the [facility], was performed utilizing techniques to minimize radiation dose exposure, including the use of iterative reconstruction and automated exposure control. Radiation dose length product (DLP) for this visit: 321.01 mGy-cm IV Contrast: 100 mL of iohexol (OMNIPAQUE) Enteric Contrast: Not administered. FINDINGS: ABDOMEN LOWER CHEST: No clinically significant abnormality in the visualized lower chest. LIVER/BILIARY TREE: Unremarkable. GALLBLADDER: No calcified gallstones. No pericholecystic inflammatory change. SPLEEN: Unremarkable. PANCREAS: Unremarkable. ADRENAL GLANDS: Unremarkable. KIDNEYS/URETERS: Unremarkable. No hydronephrosis. STOMACH AND  BOWEL: Unremarkable. APPENDIX: Normal. ABDOMINOPELVIC CAVITY: There is trace pelvic free fluid. No pneumoperitoneum. No lymphadenopathy. VESSELS: Unremarkable for patient's age. PELVIS REPRODUCTIVE ORGANS: There is a 4.7 x 2.9 cm left ovarian cyst which does not appear entirely simple. There is a 2 cm left uterine body fibroid. URINARY BLADDER: Unremarkable. ABDOMINAL WALL/INGUINAL REGIONS: Unremarkable. BONES: No acute fracture or suspicious osseous lesion.     Impression: No acute findings in the abdomen or pelvis. No hydronephrosis. 4.7 x 2.9 cm left ovarian cyst. A pelvic ultrasound is recommended for further evaluation. Trace pelvic free fluid. Workstation performed: [id number]       Kika Rueda PA-C   Available on [employer]

## 2024-05-06 NOTE — PROGRESS NOTES
[facility] - Outpatient Follow-up Note  Zelda Mayo 38 y.o. female MRN: [id number]  Encounter: [id number]    ASSESSMENT AND PLAN:    1. Epigastric pain  2. Nausea and vomiting in adult  3. Gastroesophageal reflux disease, unspecified whether esophagitis present  4. H. pylori infection  Patient with history of H. pylori treated with quadruple therapy antibiotics and amoxicillin/levofloxacin triple therapy presenting with worsening epigastric pain, GERD symptoms, nausea and intermittent vomiting.  No prior EGD.  Unclear if still has H. pylori infection or not.  No recent H. pylori stool antigen/test of cure.  She was recently started on pantoprazole 40 mg by PCP.    Discussed with patient I am unsure her symptoms are related to her ovarian cyst.  I recommend she keep her follow-up appointment with gynecology as scheduled.    I recommend that she continue with pantoprazole 40 mg once daily in the morning.  We discussed that this can take a few weeks to reach peak effectiveness.  Will order EGD with biopsy to r/o esophagitis/ gastritis/ H pylori/ PUD. I educated patient on GERD diet and lifestyle including avoidance of trigger foods including fatty, greasy, spicy foods, chocolate, caffeine, alcohol, citrus foods, tomato sauces. We discussed  the importance of eating smaller frequent meals, not eating close to bedtime.  I provided a handout on GERD and GERD diet in the office today.    - Ambulatory Referral to Gastroenterology  - Colonoscopy; Future  - EGD; Future    5. Change in bowel habit  6. Irregular bowel habits  Patient also complains of irregular bowels, change in bowel habit for the last few months.  We will plan for colonoscopy at time of EGD.  Will also check CRP, TSH, celiac panel for completeness.  Encouraged adequate hydration.    - Colonoscopy; Future  - polyethylene glycol (GOLYTELY) 4000 mL solution; Take 4,000 mL by mouth once for 1 dose Take as directed by office  instructions prior to colonoscopy.  Dispense: 4000 mL; Refill: 0  - C-reactive protein; Future  - TSH, 3rd generation with Free T4 reflex; Future  - IgA; Future  - Tissue transglutaminase, IgA; Future    I obtained informed consent from the patient the risk/benefits/alternatives of the procedure/s were discussed with the patient.  Risks included, but not limited to, infection, bleeding, perforation, injury to organs in the abdomen, missed lesion and incomplete procedure were discussed.  Patient was agreeable and electronic signature was obtained.      Patient was instructed to call the office with any questions, concerns, new/ worsening/ persisting GI symptoms. Advised patient go to the ER with any severe or worsening abdominal pain, fevers/ chills, intractable N/V, chest pain, SOB, dizziness, lightheadedness, feeling something stuck in esophagus that will not go down. Patient expressed understanding and is in agreement with treatment plan.     Will plan to follow up after diagnostic tests   __________________________________________________________    SUBJECTIVE:      Patient is new to me and our office.  Patient with a past medical history of migraines, chronic rhinitis, asthma, ovarian cyst, back pain, anxiety and depression, prediabetes, personal history of nicotine dependence, history of H. pylori infection presents to the office today as a referral to discuss abdominal pain.  Patient was seen in the emergency room 4/26/2024 with complaints of flank pain, emergency room note reviewed.  Workup in ER notable for normal urinalysis.  CMP unremarkable aside from slightly low potassium.  Normal kidney and liver function.  Lipase normal.  CBC no anemia, slight leukocytosis.  She underwent CT scan which showed no acute findings in the abdomen or pelvis.  Large ovarian cyst seen.  It was recommended patient undergo pelvic ultrasound but patient ultimately signed out AGAINST MEDICAL ADVICE.    Her son is present for  "visit today.     Patient complains of epigastric pain x years, worsening over the last 6 months.   Can radiate to left side.   She describes the pain as burning and \"inflammation\".   The epigastric pain worsens after eating. No certain food triggers.  Pain on left side is worse with constipation.   There is associated nausea and intermittent vomiting, heartburn, gas, decreased appetite   Her bowels are very irregular last few months. Alternates between diarrhea and constipation, small caliber stools   Patient takes pantoprazole 40 mg once daily in the morning.  She is also recently prescribed dicyclomine 20 mg 4 times a day as needed and Zofran as needed. She notes dicyclomine made symptoms worse so she stopped   Patient denies any fevers/ chills, unintentional weight loss, black or bloody stools, heartburn, dysphagia, odynophagia.   Denies chest pain, SOB    Tobacco use- Current every day tobacco smoker  Alcohol use- occasional   NSAID use- She was using this often before but no longer as this makes her pain worse   No prior abdominal surgeries  No prior EGD   She notes a colonoscopy ~ 5 years ago, per patient this was done for bowel issues, and per patient there was \"lots of inflammation\". I do not have this report, patient unsure where this was completed    She denies first degree relative with CRC     Of note, patient was found to be positive for H. pylori 11/13/2023 with a positive stool antigen test.  She was prescribed quadruple therapy antibiotic regimen at that time.  Subsequently, patient was again positive for H. pylori 1/26/2024 despite antibiotics.  Per chart review she was then recommended treatment with omeprazole, amoxicillin, levofloxacin.  No test of cure completed.    She has an appointment with OB/GYN next week for ovarian cyst    Review of Systems   Constitutional:  Negative for chills and fever.   HENT:  Negative for ear pain and sore throat.    Eyes:  Negative for pain and visual disturbance. "   Respiratory:  Negative for cough and shortness of breath.    Cardiovascular:  Negative for chest pain and palpitations.   Gastrointestinal:  Positive for abdominal pain, constipation, diarrhea, nausea and vomiting.   Genitourinary:  Negative for dysuria and hematuria.   Musculoskeletal:  Negative for arthralgias and back pain.   Skin:  Negative for color change and rash.   Neurological:  Negative for seizures and syncope.   All other systems reviewed and are negative.         Historical Information   Past Medical History:   Diagnosis Date    ASCUS with positive high risk HPV cervical     HPV 18    Asthma     Chlamydia infection     Depression     IBS (irritable bowel syndrome)     Migraine     Varicose veins of both lower extremities      Past Surgical History:   Procedure Laterality Date    COLPOSCOPY       Social History   Social History     Substance and Sexual Activity   Alcohol Use Yes    Comment: social, not often     Social History     Substance and Sexual Activity   Drug Use Never     Social History     Tobacco Use   Smoking Status Every Day    Current packs/day: 1.00    Average packs/day: 1 pack/day for 15.0 years (15.0 ttl pk-yrs)    Types: Cigarettes   Smokeless Tobacco Never     Family History   Problem Relation Age of Onset    Cirrhosis Father     No Known Problems Sister     Diabetes Brother     No Known Problems Brother     Colon cancer Maternal Uncle     No Known Problems Maternal Grandmother     No Known Problems Maternal Grandfather     No Known Problems Paternal Grandmother     No Known Problems Paternal Grandfather     Autism Son     Depression Son     No Known Problems Cousin        Meds/Allergies       Current Outpatient Medications:     albuterol (PROVENTIL HFA,VENTOLIN HFA) 90 mcg/act inhaler    clotrimazole (LOTRIMIN) 1 % cream    dicyclomine (BENTYL) 10 mg capsule    Eye Itch Relief 0.035 % ophthalmic solution    fluticasone (FLONASE) 50 mcg/act nasal spray    Fluticasone-Salmeterol  (Advair Diskus) 250-50 mcg/dose inhaler    gabapentin (NEURONTIN) 400 mg capsule    methocarbamol (Robaxin-750) 750 mg tablet    mirtazapine (REMERON) 15 mg tablet    montelukast (SINGULAIR) 10 mg tablet    ondansetron (ZOFRAN) 4 mg tablet    pantoprazole (PROTONIX) 40 mg tablet    PARoxetine (PAXIL) 40 MG tablet    QUEtiapine (SEROquel) 300 mg tablet    rizatriptan (Maxalt) 10 mg tablet    topiramate (Topamax) 25 mg tablet    vitamin B-12 (CYANOCOBALAMIN) 250 MCG TABS    tacrolimus (PROTOPIC) 0.1 % ointment    No Known Allergies        Objective     Wt Readings from Last 3 Encounters:   05/06/24 69.9 kg (154 lb)   04/30/24 68.7 kg (151 lb 6.4 oz)   04/26/24 70.8 kg (156 lb)     Temp Readings from Last 3 Encounters:   04/30/24 98 °F (36.7 °C) (Temporal)   04/26/24 98.2 °F (36.8 °C) (Temporal)   04/17/24 97.7 °F (36.5 °C) (Temporal)     BP Readings from Last 3 Encounters:   05/06/24 110/72   04/30/24 134/89   04/26/24 157/95     Pulse Readings from Last 3 Encounters:   05/06/24 80   04/30/24 98   04/26/24 92          PHYSICAL EXAM:      Physical Exam  Vitals reviewed.   Constitutional:       General: She is not in acute distress.     Appearance: She is not toxic-appearing.   HENT:      Head: Normocephalic and atraumatic.   Eyes:      Extraocular Movements: Extraocular movements intact.      Conjunctiva/sclera: Conjunctivae normal.   Cardiovascular:      Rate and Rhythm: Normal rate and regular rhythm.   Pulmonary:      Effort: Pulmonary effort is normal. No respiratory distress.      Breath sounds: Normal breath sounds.   Abdominal:      General: Bowel sounds are normal.      Palpations: Abdomen is soft.      Tenderness: There is abdominal tenderness (minimal) in the epigastric area.   Musculoskeletal:         General: No swelling or tenderness.      Cervical back: Normal range of motion and neck supple.   Skin:     General: Skin is warm and dry.      Coloration: Skin is not jaundiced.   Neurological:      General:  No focal deficit present.      Mental Status: She is alert and oriented to person, place, and time. Mental status is at baseline.   Psychiatric:         Mood and Affect: Mood normal.         Behavior: Behavior normal.         Thought Content: Thought content normal.          Lab Results:   No visits with results within 1 Day(s) from this visit.   Latest known visit with results is:   Admission on 04/26/2024, Discharged on 04/26/2024   Component Date Value    WBC 04/26/2024 10.60 (H)     RBC 04/26/2024 4.78     Hemoglobin 04/26/2024 14.2     Hematocrit 04/26/2024 43.9     MCV 04/26/2024 92     MCH 04/26/2024 29.7     MCHC 04/26/2024 32.3     RDW 04/26/2024 14.3     MPV 04/26/2024 9.5     Platelets 04/26/2024 324     nRBC 04/26/2024 0     Segmented % 04/26/2024 64     Immature Grans % 04/26/2024 1     Lymphocytes % 04/26/2024 26     Monocytes % 04/26/2024 7     Eosinophils Relative 04/26/2024 1     Basophils Relative 04/26/2024 1     Absolute Neutrophils 04/26/2024 6.98     Absolute Immature Grans 04/26/2024 0.05     Absolute Lymphocytes 04/26/2024 2.75     Absolute Monocytes 04/26/2024 0.71     Eosinophils Absolute 04/26/2024 0.06     Basophils Absolute 04/26/2024 0.05     Sodium 04/26/2024 139     Potassium 04/26/2024 3.3 (L)     Chloride 04/26/2024 108     CO2 04/26/2024 22     ANION GAP 04/26/2024 9     BUN 04/26/2024 10     Creatinine 04/26/2024 0.67     Glucose 04/26/2024 136     Calcium 04/26/2024 9.6     AST 04/26/2024 12 (L)     ALT 04/26/2024 15     Alkaline Phosphatase 04/26/2024 81     Total Protein 04/26/2024 7.5     Albumin 04/26/2024 4.4     Total Bilirubin 04/26/2024 0.25     eGFR 04/26/2024 111     Lipase 04/26/2024 18     Color, UA 04/26/2024 Light Yellow     Clarity, UA 04/26/2024 Hazy     Specific [place], UA 04/26/2024 1.011     pH, UA 04/26/2024 7.5     Leukocytes, UA 04/26/2024 Negative     Nitrite, UA 04/26/2024 Negative     Protein, UA 04/26/2024 Negative     Glucose, UA 04/26/2024 Negative      Ketones, UA 04/26/2024 Negative     Urobilinogen, UA 04/26/2024 <2.0     Bilirubin, UA 04/26/2024 Negative     Occult Blood, UA 04/26/2024 Negative     EXT Preg Test, Ur 04/26/2024 Negative     Control 04/26/2024 Valid        Lab Results   Component Value Date    WBC 10.60 (H) 04/26/2024    HGB 14.2 04/26/2024    HCT 43.9 04/26/2024    MCV 92 04/26/2024     04/26/2024       Lab Results   Component Value Date    SODIUM 139 04/26/2024    K 3.3 (L) 04/26/2024     04/26/2024    CO2 22 04/26/2024    AGAP 9 04/26/2024    BUN 10 04/26/2024    CREATININE 0.67 04/26/2024    GLUC 136 04/26/2024    GLUF 108 (H) 11/09/2023    CALCIUM 9.6 04/26/2024    AST 12 (L) 04/26/2024    ALT 15 04/26/2024    ALKPHOS 81 04/26/2024    TP 7.5 04/26/2024    TBILI 0.25 04/26/2024    EGFR 111 04/26/2024       Radiology Results:   US pelvis complete w transvaginal    Result Date: 4/30/2024  Narrative: PELVIC ULTRASOUND, COMPLETE INDICATION: The patient is 38 years old. N83.202: Unspecified ovarian cyst, left side. COMPARISON: CT April 26, 2024, ultrasound December 27, 2022 TECHNIQUE: Transabdominal pelvic ultrasound was performed in sagittal and transverse planes with a curvilinear transducer. Additional transvaginal imaging was performed to better evaluate the endometrium and ovaries. Imaging included volumetric sweeps as  well as traditional still imaging technique. FINDINGS: UTERUS: The uterus is retroverted in position, measuring 7.1 x 4.5 x 5.6 cm. The uterus has a normal contour and echotexture. Small subserosal leiomyoma right lateral uterine body, 1 cm. Additional mixed echogenicity partly cystic looking structure in the left lower uterine segment. This has a similar size and appearance to prior  ultrasound. While this could represent a myoma with cystic degeneration, alternative etiology would be focal varix formation. The cervix appears within normal limits. ENDOMETRIUM: The endometrial echo complex has an AP caliber of  8.0 mm. Appearance within normal limits. OVARIES/ADNEXA: Right ovary: 1.6 x 1.0 x 1.1 cm. 0.9 mL. Ovarian Doppler flow is within normal limits. No suspicious ovarian or adnexal abnormality. Left ovary: 4.2 x 2.4 x 1.6 cm. 8.2 mL. Ovarian Doppler flow is within normal limits. On direct comparison to CT from a few days ago. The left ovary overall is normal in size and contains what appears to be a typical benign-appearing cyst with fishnet weblike appearance, most consistent with typical hemorrhagic cyst which is currently 2.4  x 1.1 x 1.6 cm., Correlating with the CT finding for general shape and size. OTHER: No free fluid or loculated fluid collections.     Impression: Hemorrhagic cyst left ovary correlating with CT finding. Ovary remains otherwise normal in position, size and appearance and contains color Doppler flow. Workstation performed: [id number]         CT abdomen pelvis with contrast    Result Date: 4/26/2024  Narrative: CT ABDOMEN AND PELVIS WITH IV CONTRAST INDICATION: left upper quadrant / flank pain. COMPARISON: CT abdomen pelvis dated November 1, 2023. TECHNIQUE: CT examination of the abdomen and pelvis was performed. Multiplanar 2D reformatted images were created from the source data. This examination, like all CT scans performed in the [facility], was performed utilizing techniques to minimize radiation dose exposure, including the use of iterative reconstruction and automated exposure control. Radiation dose length product (DLP) for this visit: 321.01 mGy-cm IV Contrast: 100 mL of iohexol (OMNIPAQUE) Enteric Contrast: Not administered. FINDINGS: ABDOMEN LOWER CHEST: No clinically significant abnormality in the visualized lower chest. LIVER/BILIARY TREE: Unremarkable. GALLBLADDER: No calcified gallstones. No pericholecystic inflammatory change. SPLEEN: Unremarkable. PANCREAS: Unremarkable. ADRENAL GLANDS: Unremarkable. KIDNEYS/URETERS: Unremarkable. No hydronephrosis. STOMACH AND  BOWEL: Unremarkable. APPENDIX: Normal. ABDOMINOPELVIC CAVITY: There is trace pelvic free fluid. No pneumoperitoneum. No lymphadenopathy. VESSELS: Unremarkable for patient's age. PELVIS REPRODUCTIVE ORGANS: There is a 4.7 x 2.9 cm left ovarian cyst which does not appear entirely simple. There is a 2 cm left uterine body fibroid. URINARY BLADDER: Unremarkable. ABDOMINAL WALL/INGUINAL REGIONS: Unremarkable. BONES: No acute fracture or suspicious osseous lesion.     Impression: No acute findings in the abdomen or pelvis. No hydronephrosis. 4.7 x 2.9 cm left ovarian cyst. A pelvic ultrasound is recommended for further evaluation. Trace pelvic free fluid. Workstation performed: [id number]       Kika Rueda PA-C   Available on [employer]

## 2024-05-06 NOTE — PATIENT INSTRUCTIONS
Scheduled date of colonoscopy/EGD (as of today): 05/17/2024  Physician performing colonoscopy: Dr. Schwarz  Location of colonoscopy:   Bowel prep reviewed with patient: Golytely   Instructions reviewed with patient by: Leelee NEWTON   Clearances:  N/A

## 2024-05-06 NOTE — TELEPHONE ENCOUNTER
Spoke with patient she would like to cx for now and cb to r/s. She is going to be having surgery and wants to wait until after.

## 2024-05-08 ENCOUNTER — APPOINTMENT (OUTPATIENT)
Dept: LAB | Facility: CLINIC | Age: 39
End: 2024-05-08
Payer: COMMERCIAL

## 2024-05-08 DIAGNOSIS — R19.4 CHANGE IN BOWEL HABIT: ICD-10-CM

## 2024-05-08 DIAGNOSIS — R10.13 EPIGASTRIC PAIN: ICD-10-CM

## 2024-05-08 LAB
ANION GAP SERPL CALCULATED.3IONS-SCNC: 10 MMOL/L (ref 4–13)
BASOPHILS # BLD AUTO: 0.04 THOUSANDS/ÂΜL (ref 0–0.1)
BASOPHILS NFR BLD AUTO: 0 % (ref 0–1)
BUN SERPL-MCNC: 17 MG/DL (ref 5–25)
CALCIUM SERPL-MCNC: 9 MG/DL (ref 8.4–10.2)
CHLORIDE SERPL-SCNC: 111 MMOL/L (ref 96–108)
CO2 SERPL-SCNC: 23 MMOL/L (ref 21–32)
CREAT SERPL-MCNC: 0.7 MG/DL (ref 0.6–1.3)
CRP SERPL QL: 3.9 MG/L
EOSINOPHIL # BLD AUTO: 0.16 THOUSAND/ÂΜL (ref 0–0.61)
EOSINOPHIL NFR BLD AUTO: 2 % (ref 0–6)
ERYTHROCYTE [DISTWIDTH] IN BLOOD BY AUTOMATED COUNT: 14.6 % (ref 11.6–15.1)
GFR SERPL CREATININE-BSD FRML MDRD: 109 ML/MIN/1.73SQ M
GLUCOSE P FAST SERPL-MCNC: 102 MG/DL (ref 65–99)
HCT VFR BLD AUTO: 42.7 % (ref 34.8–46.1)
HGB BLD-MCNC: 13.3 G/DL (ref 11.5–15.4)
IGA SERPL-MCNC: 266 MG/DL (ref 66–433)
IMM GRANULOCYTES # BLD AUTO: 0.04 THOUSAND/UL (ref 0–0.2)
IMM GRANULOCYTES NFR BLD AUTO: 0 % (ref 0–2)
LYMPHOCYTES # BLD AUTO: 2.83 THOUSANDS/ÂΜL (ref 0.6–4.47)
LYMPHOCYTES NFR BLD AUTO: 27 % (ref 14–44)
MCH RBC QN AUTO: 28.9 PG (ref 26.8–34.3)
MCHC RBC AUTO-ENTMCNC: 31.1 G/DL (ref 31.4–37.4)
MCV RBC AUTO: 93 FL (ref 82–98)
MONOCYTES # BLD AUTO: 0.67 THOUSAND/ÂΜL (ref 0.17–1.22)
MONOCYTES NFR BLD AUTO: 7 % (ref 4–12)
NEUTROPHILS # BLD AUTO: 6.64 THOUSANDS/ÂΜL (ref 1.85–7.62)
NEUTS SEG NFR BLD AUTO: 64 % (ref 43–75)
NRBC BLD AUTO-RTO: 0 /100 WBCS
PLATELET # BLD AUTO: 341 THOUSANDS/UL (ref 149–390)
PMV BLD AUTO: 11 FL (ref 8.9–12.7)
POTASSIUM SERPL-SCNC: 3.8 MMOL/L (ref 3.5–5.3)
RBC # BLD AUTO: 4.61 MILLION/UL (ref 3.81–5.12)
SODIUM SERPL-SCNC: 144 MMOL/L (ref 135–147)
TSH SERPL DL<=0.05 MIU/L-ACNC: 2.12 UIU/ML (ref 0.45–4.5)
WBC # BLD AUTO: 10.38 THOUSAND/UL (ref 4.31–10.16)

## 2024-05-08 PROCEDURE — 86140 C-REACTIVE PROTEIN: CPT

## 2024-05-08 PROCEDURE — 36415 COLL VENOUS BLD VENIPUNCTURE: CPT

## 2024-05-08 PROCEDURE — 86364 TISS TRNSGLTMNASE EA IG CLAS: CPT

## 2024-05-08 PROCEDURE — 82784 ASSAY IGA/IGD/IGG/IGM EACH: CPT

## 2024-05-08 PROCEDURE — 80048 BASIC METABOLIC PNL TOTAL CA: CPT

## 2024-05-08 PROCEDURE — 84443 ASSAY THYROID STIM HORMONE: CPT

## 2024-05-08 PROCEDURE — 85025 COMPLETE CBC W/AUTO DIFF WBC: CPT

## 2024-05-09 LAB — TTG IGA SER-ACNC: <2 U/ML (ref 0–3)

## 2024-05-13 DIAGNOSIS — G43.E09 CHRONIC MIGRAINE WITH AURA WITHOUT STATUS MIGRAINOSUS, NOT INTRACTABLE: ICD-10-CM

## 2024-05-13 RX ORDER — RIZATRIPTAN BENZOATE 10 MG/1
10 TABLET ORAL AS NEEDED
Qty: 10 TABLET | Refills: 3 | Status: SHIPPED | OUTPATIENT
Start: 2024-05-13

## 2024-05-13 RX ORDER — TOPIRAMATE 25 MG/1
50 TABLET ORAL
Qty: 60 TABLET | Refills: 3 | Status: SHIPPED | OUTPATIENT
Start: 2024-05-13

## 2024-05-14 ENCOUNTER — OFFICE VISIT (OUTPATIENT)
Dept: OBGYN CLINIC | Facility: CLINIC | Age: 39
End: 2024-05-14

## 2024-05-14 VITALS
HEART RATE: 80 BPM | HEIGHT: 62 IN | RESPIRATION RATE: 18 BRPM | WEIGHT: 153.8 LBS | DIASTOLIC BLOOD PRESSURE: 69 MMHG | SYSTOLIC BLOOD PRESSURE: 109 MMHG | BODY MASS INDEX: 28.3 KG/M2

## 2024-05-14 DIAGNOSIS — N83.202 HEMORRHAGIC CYST OF LEFT OVARY: Primary | ICD-10-CM

## 2024-05-14 DIAGNOSIS — R11.0 NAUSEA: ICD-10-CM

## 2024-05-14 PROCEDURE — 99213 OFFICE O/P EST LOW 20 MIN: CPT | Performed by: OBSTETRICS & GYNECOLOGY

## 2024-05-14 RX ORDER — METOCLOPRAMIDE 5 MG/1
5 TABLET ORAL 4 TIMES DAILY
Qty: 30 TABLET | Refills: 2 | Status: SHIPPED | OUTPATIENT
Start: 2024-05-14

## 2024-05-14 NOTE — ASSESSMENT & PLAN NOTE
Continue PRN analgesia and heating pad  Will do iinterval ultrasound in 3-4 months to assess for resolution

## 2024-05-14 NOTE — PROGRESS NOTES
Counts include 234 beds at the [facility]    Problem List Items Addressed This Visit          Endocrine    Hemorrhagic cyst of left ovary - Primary     Continue PRN analgesia and heating pad  Will do iinterval ultrasound in 3-4 months to assess for resolution         Relevant Orders    US pelvis complete non OB     Other Visit Diagnoses       Nausea        Relevant Medications    metoclopramide (REGLAN) 5 mg tablet            Subjective:   Zelda Mayo is a 38 y.o.  here to discuss her pelvic pain and recently diagnosed left sided ovarian cyst.    Cyst first noted on CT scan during ED visit 24 for abdominal pain. Per report: REPRODUCTIVE ORGANS: There is a 4.7 x 2.9 cm left ovarian cyst which does not appear entirely simple. There is a 2 cm left uterine body fibroid.     Follow up ultrasound 24 FINDINGS:     UTERUS:  The uterus is retroverted in position, measuring 7.1 x 4.5 x 5.6 cm.  The uterus has a normal contour and echotexture. Small subserosal leiomyoma right lateral uterine body, 1 cm. Additional mixed echogenicity partly cystic looking structure in the left lower uterine segment. This has a similar size and appearance to prior   ultrasound. While this could represent a myoma with cystic degeneration, alternative etiology would be focal varix formation.  The cervix appears within normal limits.     ENDOMETRIUM:  The endometrial echo complex has an AP caliber of 8.0 mm.  Appearance within normal limits.     OVARIES/ADNEXA:  Right ovary: 1.6 x 1.0 x 1.1 cm. 0.9 mL.  Ovarian Doppler flow is within normal limits.  No suspicious ovarian or adnexal abnormality.     Left ovary: 4.2 x 2.4 x 1.6 cm. 8.2 mL.  Ovarian Doppler flow is within normal limits.  On direct comparison to CT from a few days ago. The left ovary overall is normal in size and contains what appears to be a typical benign-appearing cyst with fishnet weblike appearance, most consistent with typical hemorrhagic cyst which is  "currently 2.4   x 1.1 x 1.6 cm., Correlating with the CT finding for general shape and size.     OTHER:  No free fluid or loculated fluid collections.     IMPRESSION:     Hemorrhagic cyst left ovary correlating with CT finding. Ovary remains otherwise normal in position, size and appearance and contains color Doppler flow.      Still with pelvic and abdominal pain and nausea. Seeing GI for H pylori. infection    Objective:  Vitals: Blood pressure 109/69, pulse 80, resp. rate 18, height 5' 2\" (1.575 m), weight 69.8 kg (153 lb 12.8 oz), last menstrual period 04/30/2024. Body mass index is 28.13 kg/m².    Physical Exam  Vitals reviewed.   Constitutional:       General: She is not in acute distress.     Appearance: She is normal weight. She is not ill-appearing or toxic-appearing.   Abdominal:      General: There is no distension.      Tenderness: There is no abdominal tenderness. There is no guarding or rebound.      Comments: No surgical scars noted   Genitourinary:     Comments: Normal external female genitalia. No cervical motion tenderness. No palpable adnexal fullness noted. 8w size anteverted uterus  Musculoskeletal:         General: Normal range of motion.   Skin:     General: Skin is warm and dry.   Neurological:      Mental Status: She is alert and oriented to person, place, and time.   Psychiatric:         Mood and Affect: Mood normal.         Behavior: Behavior normal.         Patient discussed with Dr. Ambrosio.    Sera Yates MD  PGY-IV, OB/GYN  5/14/2024    "

## 2024-05-16 RX ORDER — SODIUM CHLORIDE, SODIUM LACTATE, POTASSIUM CHLORIDE, CALCIUM CHLORIDE 600; 310; 30; 20 MG/100ML; MG/100ML; MG/100ML; MG/100ML
50 INJECTION, SOLUTION INTRAVENOUS CONTINUOUS
Status: CANCELLED | OUTPATIENT
Start: 2024-05-16

## 2024-05-17 ENCOUNTER — ANESTHESIA (OUTPATIENT)
Dept: GASTROENTEROLOGY | Facility: HOSPITAL | Age: 39
End: 2024-05-17

## 2024-05-17 ENCOUNTER — HOSPITAL ENCOUNTER (OUTPATIENT)
Dept: GASTROENTEROLOGY | Facility: HOSPITAL | Age: 39
Setting detail: OUTPATIENT SURGERY
End: 2024-05-17
Payer: COMMERCIAL

## 2024-05-17 ENCOUNTER — ANESTHESIA EVENT (OUTPATIENT)
Dept: GASTROENTEROLOGY | Facility: HOSPITAL | Age: 39
End: 2024-05-17

## 2024-05-17 VITALS
TEMPERATURE: 97.6 F | SYSTOLIC BLOOD PRESSURE: 128 MMHG | BODY MASS INDEX: 28.16 KG/M2 | RESPIRATION RATE: 18 BRPM | HEIGHT: 62 IN | OXYGEN SATURATION: 100 % | HEART RATE: 78 BPM | WEIGHT: 153 LBS | DIASTOLIC BLOOD PRESSURE: 82 MMHG

## 2024-05-17 DIAGNOSIS — R10.13 EPIGASTRIC PAIN: ICD-10-CM

## 2024-05-17 DIAGNOSIS — R19.8 IRREGULAR BOWEL HABITS: ICD-10-CM

## 2024-05-17 DIAGNOSIS — R19.4 CHANGE IN BOWEL HABIT: ICD-10-CM

## 2024-05-17 DIAGNOSIS — K21.9 GASTROESOPHAGEAL REFLUX DISEASE, UNSPECIFIED WHETHER ESOPHAGITIS PRESENT: ICD-10-CM

## 2024-05-17 DIAGNOSIS — A04.8 H. PYLORI INFECTION: ICD-10-CM

## 2024-05-17 DIAGNOSIS — R11.2 NAUSEA AND VOMITING IN ADULT: ICD-10-CM

## 2024-05-17 LAB
EXT PREGNANCY TEST URINE: NEGATIVE
EXT. CONTROL: NORMAL

## 2024-05-17 PROCEDURE — 81025 URINE PREGNANCY TEST: CPT | Performed by: ANESTHESIOLOGY

## 2024-05-17 PROCEDURE — 45380 COLONOSCOPY AND BIOPSY: CPT | Performed by: INTERNAL MEDICINE

## 2024-05-17 PROCEDURE — 43239 EGD BIOPSY SINGLE/MULTIPLE: CPT | Performed by: INTERNAL MEDICINE

## 2024-05-17 PROCEDURE — 88305 TISSUE EXAM BY PATHOLOGIST: CPT | Performed by: PATHOLOGY

## 2024-05-17 RX ORDER — PROPOFOL 10 MG/ML
INJECTION, EMULSION INTRAVENOUS CONTINUOUS PRN
Status: DISCONTINUED | OUTPATIENT
Start: 2024-05-17 | End: 2024-05-17

## 2024-05-17 RX ORDER — PROPOFOL 10 MG/ML
INJECTION, EMULSION INTRAVENOUS AS NEEDED
Status: DISCONTINUED | OUTPATIENT
Start: 2024-05-17 | End: 2024-05-17

## 2024-05-17 RX ORDER — LIDOCAINE HYDROCHLORIDE 10 MG/ML
INJECTION, SOLUTION EPIDURAL; INFILTRATION; INTRACAUDAL; PERINEURAL AS NEEDED
Status: DISCONTINUED | OUTPATIENT
Start: 2024-05-17 | End: 2024-05-17

## 2024-05-17 RX ORDER — SODIUM CHLORIDE, SODIUM LACTATE, POTASSIUM CHLORIDE, CALCIUM CHLORIDE 600; 310; 30; 20 MG/100ML; MG/100ML; MG/100ML; MG/100ML
50 INJECTION, SOLUTION INTRAVENOUS CONTINUOUS
Status: DISCONTINUED | OUTPATIENT
Start: 2024-05-17 | End: 2024-05-21 | Stop reason: HOSPADM

## 2024-05-17 RX ADMIN — PROPOFOL 120 MCG/KG/MIN: 10 INJECTION, EMULSION INTRAVENOUS at 10:37

## 2024-05-17 RX ADMIN — PROPOFOL 30 MG: 10 INJECTION, EMULSION INTRAVENOUS at 10:46

## 2024-05-17 RX ADMIN — SODIUM CHLORIDE, SODIUM LACTATE, POTASSIUM CHLORIDE, AND CALCIUM CHLORIDE 50 ML/HR: .6; .31; .03; .02 INJECTION, SOLUTION INTRAVENOUS at 10:23

## 2024-05-17 RX ADMIN — PROPOFOL 50 MG: 10 INJECTION, EMULSION INTRAVENOUS at 10:40

## 2024-05-17 RX ADMIN — PROPOFOL 50 MG: 10 INJECTION, EMULSION INTRAVENOUS at 10:43

## 2024-05-17 RX ADMIN — LIDOCAINE HYDROCHLORIDE 50 MG: 10 INJECTION, SOLUTION EPIDURAL; INFILTRATION; INTRACAUDAL; PERINEURAL at 10:36

## 2024-05-17 RX ADMIN — PROPOFOL 100 MG: 10 INJECTION, EMULSION INTRAVENOUS at 10:37

## 2024-05-17 NOTE — INTERVAL H&P NOTE
H&P reviewed. After examining the patient I find no changes in the patients condition since the H&P had been written.    Vitals:    05/17/24 1008   BP: 128/74   Pulse: 94   Resp: 12   Temp: 98.4 °F (36.9 °C)   SpO2: 97%

## 2024-05-17 NOTE — ANESTHESIA POSTPROCEDURE EVALUATION
Post-Op Assessment Note    CV Status:  Stable    Pain management: adequate       Mental Status:  Alert and awake   Hydration Status:  Euvolemic   PONV Controlled:  Controlled   Airway Patency:  Patent     Post Op Vitals Reviewed: Yes    No anethesia notable event occurred.    Staff: CLAIRE               /68 (05/17/24 1106)    Temp 97.6 °F (36.4 °C) (05/17/24 1106)    Pulse 88 (05/17/24 1106)   Resp 18 (05/17/24 1106)    SpO2 98 % (05/17/24 1106)

## 2024-05-17 NOTE — ANESTHESIA PREPROCEDURE EVALUATION
Procedure:  COLONOSCOPY  EGD    Relevant Problems   CARDIO   (+) Migraine      MUSCULOSKELETAL   (+) Cervical spondylosis   (+) Chronic bilateral low back pain with left-sided sciatica      NEURO/PSYCH   (+) Anxiety   (+) Chronic bilateral low back pain with left-sided sciatica   (+) Depression   (+) Migraine      PULMONARY   (+) Moderate persistent asthma without complication        Physical Exam    Airway    Mallampati score: II  TM Distance: >3 FB  Neck ROM: full     Dental       Cardiovascular  Cardiovascular exam normal    Pulmonary  Pulmonary exam normal     Other Findings  post-pubertal.      Anesthesia Plan  ASA Score- 3     Anesthesia Type- IV sedation with anesthesia with ASA Monitors.         Additional Monitors:     Airway Plan:     Comment: Asked pt to use her inhaler just as a precaution .       Plan Factors-Exercise tolerance (METS): >4 METS.    Chart reviewed. EKG reviewed.  Existing labs reviewed. Patient summary reviewed.    Patient is a current smoker.  Patient instructed to abstain from smoking on day of procedure. Patient smoked on day of surgery.            Induction-     Postoperative Plan-     Perioperative Resuscitation Plan - Level 1 - Full Code.       Informed Consent- Anesthetic plan and risks discussed with patient.  I personally reviewed this patient with the CRNA. Discussed and agreed on the Anesthesia Plan with the CRNA..        Lab Results   Component Value Date    HGBA1C 6.4 (H) 01/26/2024       Lab Results   Component Value Date    K 3.8 05/08/2024     (H) 05/08/2024    CO2 23 05/08/2024    BUN 17 05/08/2024    CREATININE 0.70 05/08/2024    GLUF 102 (H) 05/08/2024    CALCIUM 9.0 05/08/2024    AST 12 (L) 04/26/2024    ALT 15 04/26/2024    ALKPHOS 81 04/26/2024    EGFR 109 05/08/2024       Lab Results   Component Value Date    WBC 10.38 (H) 05/08/2024    HGB 13.3 05/08/2024    HCT 42.7 05/08/2024    MCV 93 05/08/2024     05/08/2024     EKG   Ectopic atrial  rhythm  Abnormal ECG  When compared with ECG of 01-NOV-2023 17:43, (unconfirmed)  No significant change was found  Confirmed by Jesus Fuller ([id number]) on 11/1/2023 10:34:35 PM

## 2024-05-22 PROCEDURE — 88305 TISSUE EXAM BY PATHOLOGIST: CPT | Performed by: PATHOLOGY

## 2024-05-31 ENCOUNTER — TELEPHONE (OUTPATIENT)
Dept: GASTROENTEROLOGY | Facility: CLINIC | Age: 39
End: 2024-05-31

## 2024-05-31 NOTE — TELEPHONE ENCOUNTER
I left a message for the patient to return my call regarding the results. Provided contact number.

## 2024-06-04 NOTE — TELEPHONE ENCOUNTER
Spoke with the pt relayed the provider messages regarding the results and pt verbalizes understanding. Pt said she not taking any PPI.

## 2024-06-25 ENCOUNTER — HOSPITAL ENCOUNTER (OUTPATIENT)
Dept: RADIOLOGY | Age: 39
Discharge: HOME | End: 2024-06-25
Payer: COMMERCIAL

## 2024-06-25 VITALS — WEIGHT: 153 LBS | HEIGHT: 62 IN | BODY MASS INDEX: 28.16 KG/M2

## 2024-06-25 DIAGNOSIS — Z12.31 ENCOUNTER FOR SCREENING MAMMOGRAM FOR MALIGNANT NEOPLASM OF BREAST: ICD-10-CM

## 2024-06-25 PROCEDURE — 77063 BREAST TOMOSYNTHESIS BI: CPT

## 2024-06-25 PROCEDURE — 77067 SCR MAMMO BI INCL CAD: CPT

## 2024-08-05 ENCOUNTER — TELEPHONE (OUTPATIENT)
Age: 39
End: 2024-08-05

## 2024-08-05 NOTE — TELEPHONE ENCOUNTER
Pt called in to reschedule her appt because her and her dad's appt is for the same day but nothing sooner was available till Oct stated she will keep the same for 8/12/24 she will try to call back to change her dad's appt

## 2024-08-12 ENCOUNTER — OFFICE VISIT (OUTPATIENT)
Dept: GASTROENTEROLOGY | Facility: CLINIC | Age: 39
End: 2024-08-12
Payer: COMMERCIAL

## 2024-08-12 ENCOUNTER — APPOINTMENT (OUTPATIENT)
Dept: LAB | Facility: CLINIC | Age: 39
End: 2024-08-12

## 2024-08-12 VITALS
WEIGHT: 139 LBS | BODY MASS INDEX: 25.58 KG/M2 | HEIGHT: 62 IN | DIASTOLIC BLOOD PRESSURE: 80 MMHG | TEMPERATURE: 97.8 F | SYSTOLIC BLOOD PRESSURE: 122 MMHG

## 2024-08-12 DIAGNOSIS — R63.4 WEIGHT LOSS, ABNORMAL: ICD-10-CM

## 2024-08-12 DIAGNOSIS — R19.7 DIARRHEA, UNSPECIFIED TYPE: ICD-10-CM

## 2024-08-12 DIAGNOSIS — R11.2 NAUSEA AND VOMITING IN ADULT: ICD-10-CM

## 2024-08-12 DIAGNOSIS — R10.13 EPIGASTRIC PAIN: Primary | ICD-10-CM

## 2024-08-12 DIAGNOSIS — K21.9 GASTROESOPHAGEAL REFLUX DISEASE, UNSPECIFIED WHETHER ESOPHAGITIS PRESENT: ICD-10-CM

## 2024-08-12 PROCEDURE — 99214 OFFICE O/P EST MOD 30 MIN: CPT | Performed by: PHYSICIAN ASSISTANT

## 2024-08-12 RX ORDER — PANTOPRAZOLE SODIUM 40 MG/1
40 TABLET, DELAYED RELEASE ORAL
Qty: 30 TABLET | Refills: 4 | Status: SHIPPED | OUTPATIENT
Start: 2024-08-12

## 2024-08-12 NOTE — PROGRESS NOTES
[facility] - Outpatient Follow-up Note  Zelda Mayo 39 y.o. female MRN: [id number]  Encounter: [id number]    ASSESSMENT AND PLAN:    1. Epigastric pain  2. Nausea and vomiting in adult  3. Gastroesophageal reflux disease, unspecified whether esophagitis present  4. Weight loss, abnormal  We reviewed her recent EGD and biopsy results.  Patient expressed understanding to results.  All questions answered.  She continues to complain of upper abdominal pain, intermittent nausea and vomiting and GERD symptoms.  She has lost weight since last office visit.  Thankfully she did just have a CT scan in April which was grossly unremarkable.  TSH recently normal.  She is not taking PPI as previously recommended.    At this point I recommend patient resume pantoprazole 40 mg once daily before breakfast  We discussed the importance of small, frequent bland meals and the importance of hydration with sips of water throughout the day  Will plan for nuclear medicine gastric emptying scan at this time to rule out gastroparesis    If her symptoms persist after a few weeks of pantoprazole, recommend patient call the office at which point we can optimize her dosing    - pantoprazole (PROTONIX) 40 mg tablet; Take 1 tablet (40 mg total) by mouth daily before breakfast  Dispense: 30 tablet; Refill: 4  - NM gastric emptying; Future    5. Diarrhea, unspecified type  We reviewed her recent colonoscopy and biopsy results.  Patient expressed understanding to results.  All questions answered.  She has had recent diarrhea for the last several weeks.  Etiology unclear.  Will order stool studies rule out possible underlying infection, will also check fecal calprotectin.  If fecal calprotectin is elevated, would plan for CT enterography to evaluate for small bowel inflammation.  I did stress to the patient the importance of getting the stool tests completed as soon as possible, she expressed understanding of  this.      - Ova and parasite examination; Future  - Clostridium difficile toxin by PCR with EIA; Future  - Stool Enteric Bacterial Panel by PCR; Future  - Calprotectin,Fecal; Future  - Giardia antigen; Future      Patient was instructed to call the office with any questions, concerns, new/ worsening/ persisting GI symptoms. Advised patient go to the ER with any severe or worsening abdominal pain, fevers/ chills, intractable N/V, chest pain, SOB, dizziness, lightheadedness, feeling something stuck in esophagus that will not go down. Patient expressed understanding and is in agreement with treatment plan.     Will plan to follow up after diagnostic tests   __________________________________________________________    SUBJECTIVE:      Patient with a past medical history of migraines, chronic rhinitis, asthma, ovarian cyst, back pain, anxiety and depression, prediabetes, personal history of nicotine dependence, history of H. pylori infection presents to the office today for follow-up.  Patient was last seen in the office by me 5/6/2024, previous office note was reviewed.  At last office visit I recommended she continue with pantoprazole 40 mg once daily in the morning and ordered an EGD as well as labs and a colonoscopy.  Labs were completed, reviewed notable for normal/negative celiac panel, normal TSH, CRP just slightly elevated at 3.9  EGD was completed 5/17/2024, this was reviewed, this showed a 1 cm hiatal hernia, otherwise normal stomach and duodenum.  Duodenal biopsy was negative for celiac disease.  Stomach biopsy was negative for H. pylori.  Colonoscopy was completed 5/17/2024, this was reviewed, this showed a normal colon and terminal ileum.  A transverse colon polyp was removed.  Colon polyp was unremarkable/not adenomatous.  Random colon biopsy was normal and negative for colitis.  Terminal ileum biopsy was normal.    Patient declines  today.     Patient complains of acute diarrhea for  the last 3 weeks today.  She tells me she is having more than 5 loose bowel movements at day.  No known triggers for diarrhea.  She denies changes in diet, medication, travel.  She also continues to complain of epigastric abdominal pain, nausea, intermittent heartburn and vomiting.  She states that she is not taking pantoprazole 40 mg daily as previously recommended/prescribed.  She is not taking metoclopramide or Zofran.  Patient has lost more than 10 lbs since last visit. She is not trying to lose weight.   Patient denies any fevers/ chills, constipation, black or bloody stools,dysphagia, odynophagia.   Denies chest pain, SOB, dizziness    Tobacco use- Current every day tobacco smoker  Alcohol use- occasional   NSAID use- She was using this often before but no longer as this makes her pain worse   No prior abdominal surgeries  No prior EGD   She denies first degree relative with CRC      Review of Systems   Constitutional:  Positive for unexpected weight change. Negative for chills and fever.   HENT:  Negative for ear pain and sore throat.    Eyes:  Negative for pain and visual disturbance.   Respiratory:  Negative for cough and shortness of breath.    Cardiovascular:  Negative for chest pain and palpitations.   Gastrointestinal:  Positive for diarrhea, nausea and vomiting. Negative for abdominal pain.   Genitourinary:  Negative for dysuria and hematuria.   Musculoskeletal:  Negative for arthralgias and back pain.   Skin:  Negative for color change and rash.   Neurological:  Negative for seizures and syncope.   All other systems reviewed and are negative.         Historical Information   Past Medical History:   Diagnosis Date    ASCUS with positive high risk HPV cervical     HPV 18    Asthma     Chlamydia infection     Depression     IBS (irritable bowel syndrome)     Migraine     Varicose veins of both lower extremities      Past Surgical History:   Procedure Laterality Date    COLPOSCOPY       Social History    Social History     Substance and Sexual Activity   Alcohol Use Yes    Comment: social, not often     Social History     Substance and Sexual Activity   Drug Use Never     Social History     Tobacco Use   Smoking Status Every Day    Current packs/day: 1.00    Average packs/day: 1 pack/day for 15.0 years (15.0 ttl pk-yrs)    Types: Cigarettes   Smokeless Tobacco Never     Family History   Problem Relation Age of Onset    No Known Problems Mother     Cirrhosis Father     No Known Problems Sister     No Known Problems Maternal Grandmother     No Known Problems Maternal Grandfather     No Known Problems Paternal Grandmother     No Known Problems Paternal Grandfather     Diabetes Brother     No Known Problems Brother     Autism Son     Depression Son     Colon cancer Maternal Uncle     No Known Problems Cousin     Heart attack Neg Hx     Stroke Neg Hx        Meds/Allergies       Current Outpatient Medications:     albuterol (PROVENTIL HFA,VENTOLIN HFA) 90 mcg/act inhaler    clotrimazole (LOTRIMIN) 1 % cream    dicyclomine (BENTYL) 10 mg capsule    Eye Itch Relief 0.035 % ophthalmic solution    fluticasone (FLONASE) 50 mcg/act nasal spray    Fluticasone-Salmeterol (Advair Diskus) 250-50 mcg/dose inhaler    gabapentin (NEURONTIN) 400 mg capsule    methocarbamol (Robaxin-750) 750 mg tablet    metoclopramide (REGLAN) 5 mg tablet    mirtazapine (REMERON) 15 mg tablet    montelukast (SINGULAIR) 10 mg tablet    ondansetron (ZOFRAN) 4 mg tablet    pantoprazole (PROTONIX) 40 mg tablet    PARoxetine (PAXIL) 40 MG tablet    QUEtiapine (SEROquel) 300 mg tablet    rizatriptan (MAXALT) 10 mg tablet    tacrolimus (PROTOPIC) 0.1 % ointment    topiramate (TOPAMAX) 25 mg tablet    vitamin B-12 (CYANOCOBALAMIN) 250 MCG TABS    polyethylene glycol (GOLYTELY) 4000 mL solution    No Known Allergies        Objective     Wt Readings from Last 3 Encounters:   08/12/24 63 kg (139 lb)   06/25/24 69.4 kg (153 lb)   05/17/24 69.4 kg (153 lb)      Temp Readings from Last 3 Encounters:   08/12/24 97.8 °F (36.6 °C) (Tympanic)   05/17/24 97.6 °F (36.4 °C)   04/30/24 98 °F (36.7 °C) (Temporal)     BP Readings from Last 3 Encounters:   08/12/24 122/80   05/17/24 128/82   05/14/24 109/69     Pulse Readings from Last 3 Encounters:   05/17/24 78   05/14/24 80   05/06/24 80          PHYSICAL EXAM:      Physical Exam  Vitals reviewed.   Constitutional:       General: She is not in acute distress.     Appearance: She is not ill-appearing, toxic-appearing or diaphoretic.   HENT:      Head: Normocephalic and atraumatic.   Eyes:      Extraocular Movements: Extraocular movements intact.      Conjunctiva/sclera: Conjunctivae normal.   Cardiovascular:      Rate and Rhythm: Normal rate and regular rhythm.   Pulmonary:      Effort: Pulmonary effort is normal. No respiratory distress.      Breath sounds: Normal breath sounds.   Abdominal:      General: Bowel sounds are normal.      Palpations: Abdomen is soft.      Tenderness: There is no abdominal tenderness.   Musculoskeletal:         General: No swelling or tenderness.      Cervical back: Normal range of motion and neck supple.   Skin:     General: Skin is warm and dry.      Coloration: Skin is not jaundiced.   Neurological:      General: No focal deficit present.      Mental Status: She is alert and oriented to person, place, and time. Mental status is at baseline.   Psychiatric:      Comments: Flat affect          Lab Results:       Lab Results   Component Value Date    WBC 10.38 (H) 05/08/2024    HGB 13.3 05/08/2024    HCT 42.7 05/08/2024    MCV 93 05/08/2024     05/08/2024       Lab Results   Component Value Date    SODIUM 138 08/10/2024    K 3.1 (L) 08/10/2024     (H) 08/10/2024    CO2 19 (L) 08/10/2024    AGAP 9 08/10/2024    BUN 15 08/10/2024    CREATININE 0.78 08/10/2024    GLUC 96 08/10/2024    GLUF 102 (H) 05/08/2024    CALCIUM 9.7 08/10/2024    AST 12 08/10/2024    ALT 15 08/10/2024    ALKPHOS 72  08/10/2024    TP 7.5 08/10/2024    TBILI 0.3 08/10/2024    EGFR 98 08/10/2024       Radiology Results:   CT head wo contrast    Result Date: 8/10/2024  Narrative: Clinical information: Injury, headache Technique: Unenhanced CT scan of the brain was performed by department technique. Images were reviewed in soft tissue and bone algorithms with 2.5 mm axial sections, sagittal and coronal reformations. Comparison: None. Findings Brain Parenchyma: Normal. Ventricular system, basal cisterns and extra-axial spaces: Normal. Intracranial hemorrhage: None. Midline shift: None. Skull base & Calvarium: Normal. Paranasal sinuses and mastoid air cells: Retention cyst in the partially visualized left maxillary sinus. Remainder of the paranasal sinuses and mastoid air cells are well aerated. Visualized orbits: Normal. Sella: Normal.    Impression: Impression: No acute intracranial findings. Workstation:[id number]    CT abdomen pelvis with contrast     Result Date: 4/26/2024  Narrative: CT ABDOMEN AND PELVIS WITH IV CONTRAST INDICATION: left upper quadrant / flank pain. COMPARISON: CT abdomen pelvis dated November 1, 2023. TECHNIQUE: CT examination of the abdomen and pelvis was performed. Multiplanar 2D reformatted images were created from the source data. This examination, like all CT scans performed in the [facility], was performed utilizing techniques to minimize radiation dose exposure, including the use of iterative reconstruction and automated exposure control. Radiation dose length product (DLP) for this visit: 321.01 mGy-cm IV Contrast: 100 mL of iohexol (OMNIPAQUE) Enteric Contrast: Not administered. FINDINGS: ABDOMEN LOWER CHEST: No clinically significant abnormality in the visualized lower chest. LIVER/BILIARY TREE: Unremarkable. GALLBLADDER: No calcified gallstones. No pericholecystic inflammatory change. SPLEEN: Unremarkable. PANCREAS: Unremarkable. ADRENAL GLANDS: Unremarkable. KIDNEYS/URETERS:  Unremarkable. No hydronephrosis. STOMACH AND BOWEL: Unremarkable. APPENDIX: Normal. ABDOMINOPELVIC CAVITY: There is trace pelvic free fluid. No pneumoperitoneum. No lymphadenopathy. VESSELS: Unremarkable for patient's age. PELVIS REPRODUCTIVE ORGANS: There is a 4.7 x 2.9 cm left ovarian cyst which does not appear entirely simple. There is a 2 cm left uterine body fibroid. URINARY BLADDER: Unremarkable. ABDOMINAL WALL/INGUINAL REGIONS: Unremarkable. BONES: No acute fracture or suspicious osseous lesion.      Impression: No acute findings in the abdomen or pelvis. No hydronephrosis. 4.7 x 2.9 cm left ovarian cyst. A pelvic ultrasound is recommended for further evaluation. Trace pelvic free fluid. Workstation performed: [id number]        US pelvis complete w transvaginal     Result Date: 4/30/2024  Narrative: PELVIC ULTRASOUND, COMPLETE INDICATION: The patient is 38 years old. N83.202: Unspecified ovarian cyst, left side. COMPARISON: CT April 26, 2024, ultrasound December 27, 2022 TECHNIQUE: Transabdominal pelvic ultrasound was performed in sagittal and transverse planes with a curvilinear transducer. Additional transvaginal imaging was performed to better evaluate the endometrium and ovaries. Imaging included volumetric sweeps as  well as traditional still imaging technique. FINDINGS: UTERUS: The uterus is retroverted in position, measuring 7.1 x 4.5 x 5.6 cm. The uterus has a normal contour and echotexture. Small subserosal leiomyoma right lateral uterine body, 1 cm. Additional mixed echogenicity partly cystic looking structure in the left lower uterine segment. This has a similar size and appearance to prior  ultrasound. While this could represent a myoma with cystic degeneration, alternative etiology would be focal varix formation. The cervix appears within normal limits. ENDOMETRIUM: The endometrial echo complex has an AP caliber of 8.0 mm. Appearance within normal limits. OVARIES/ADNEXA: Right ovary: 1.6 x 1.0 x  1.1 cm. 0.9 mL. Ovarian Doppler flow is within normal limits. No suspicious ovarian or adnexal abnormality. Left ovary: 4.2 x 2.4 x 1.6 cm. 8.2 mL. Ovarian Doppler flow is within normal limits. On direct comparison to CT from a few days ago. The left ovary overall is normal in size and contains what appears to be a typical benign-appearing cyst with fishnet weblike appearance, most consistent with typical hemorrhagic cyst which is currently 2.4  x 1.1 x 1.6 cm., Correlating with the CT finding for general shape and size. OTHER: No free fluid or loculated fluid collections.      Impression: Hemorrhagic cyst left ovary correlating with CT finding. Ovary remains otherwise normal in position, size and appearance and contains color Doppler flow. Workstation performed: [id number]      Kika Rueda PA-C

## 2024-08-12 NOTE — PATIENT INSTRUCTIONS
Drink plenty of water   Small frequent meals     Gastric Emptying Study scheduled 11/12/24 @[facility]

## 2024-08-14 ENCOUNTER — LAB (OUTPATIENT)
Dept: LAB | Facility: CLINIC | Age: 39
End: 2024-08-14
Payer: COMMERCIAL

## 2024-08-14 ENCOUNTER — APPOINTMENT (OUTPATIENT)
Dept: LAB | Facility: CLINIC | Age: 39
End: 2024-08-14
Payer: COMMERCIAL

## 2024-08-14 ENCOUNTER — HOSPITAL ENCOUNTER (OUTPATIENT)
Dept: RADIOLOGY | Facility: HOSPITAL | Age: 39
Discharge: HOME | End: 2024-08-14
Payer: COMMERCIAL

## 2024-08-14 DIAGNOSIS — N83.202 HEMORRHAGIC CYST OF LEFT OVARY: ICD-10-CM

## 2024-08-14 DIAGNOSIS — R19.7 DIARRHEA, UNSPECIFIED TYPE: ICD-10-CM

## 2024-08-14 PROCEDURE — 76830 TRANSVAGINAL US NON-OB: CPT

## 2024-08-14 PROCEDURE — 87209 SMEAR COMPLEX STAIN: CPT

## 2024-08-14 PROCEDURE — 76856 US EXAM PELVIC COMPLETE: CPT

## 2024-08-14 PROCEDURE — 87506 IADNA-DNA/RNA PROBE TQ 6-11: CPT

## 2024-08-14 PROCEDURE — 87177 OVA AND PARASITES SMEARS: CPT

## 2024-08-14 PROCEDURE — 83993 ASSAY FOR CALPROTECTIN FECAL: CPT

## 2024-08-15 LAB
C COLI+JEJUNI TUF STL QL NAA+PROBE: NEGATIVE
C DIFF TOX A+B STL QL IA: NEGATIVE
C DIFF TOX GENS STL QL NAA+PROBE: POSITIVE
CALPROTECTIN STL-MCNC: 16.4 ÂΜG/G
EC STX1+STX2 GENES STL QL NAA+PROBE: NEGATIVE
G LAMBLIA AG STL QL IA: NEGATIVE
SALMONELLA SP SPAO STL QL NAA+PROBE: NEGATIVE
SHIGELLA SP+EIEC IPAH STL QL NAA+PROBE: NEGATIVE

## 2024-08-15 NOTE — RESULT ENCOUNTER NOTE
Patient is Emir[place]-speaking    Please call the patient and let her know that I got the results from her stool test I had ordered.  Her stool tests are showing possible C. difficile colonization.  Please ask the patient-is she still having diarrhea?  If so, we should probably treat her for likely C. difficile infection.    I am going on vacation so please message me back if the patient is having diarrhea so one of the PAs covering my inbox can send in antibiotics for her while I am gone. (Vancomycin 125 mcg 4 times daily for 10 days)    If she is still having diarrhea she should also be sure to practice thorough/careful hand hygiene and wash her sheets and towels in hot water as C. difficile can be very contagious.  She should also deep clean her bathrooms with bleach in order to prevent spread of infection.    If she is not having diarrhea anymore, I do not think we need to treat her.    Any questions or concerns please let me know.   Thanks!  Kika Rueda PA-C

## 2024-08-20 ENCOUNTER — TELEPHONE (OUTPATIENT)
Dept: FAMILY MEDICINE CLINIC | Facility: CLINIC | Age: 39
End: 2024-08-20

## 2024-08-20 DIAGNOSIS — A04.72 C. DIFFICILE DIARRHEA: Primary | ICD-10-CM

## 2024-08-20 RX ORDER — VANCOMYCIN HYDROCHLORIDE 125 MG/1
125 CAPSULE ORAL 4 TIMES DAILY
Qty: 40 CAPSULE | Refills: 0 | Status: SHIPPED | OUTPATIENT
Start: 2024-08-20 | End: 2024-08-30

## 2024-08-20 NOTE — TELEPHONE ENCOUNTER
Tuyet Oseguera MA  8/19/2024  3:49 PM EDT Back to Top      Phone call to pt using  line, to see how pt was, if she still had the diarrhea, States still has the diarrhea and abd pain, so said we can treat with vancomycin 125 mcg 4 times daily for 10 days. She agreed and told her call us back if she has any reaction or side effect.    Kika Rueda PA-C  8/15/2024  4:39 PM EDT       Patient is Green[place]ic-speaking     Please call the patient and let her know that I got the results from her stool test I had ordered.  Her stool tests are showing possible C. difficile colonization.  Please ask the patient-is she still having diarrhea?  If so, we should probably treat her for likely C. difficile infection.     I am going on vacation so please message me back if the patient is having diarrhea so one of the PAs covering my inbox can send in antibiotics for her while I am gone. (Vancomycin 125 mcg 4 times daily for 10 days)     If she is still having diarrhea she should also be sure to practice thorough/careful hand hygiene and wash her sheets and towels in hot water as C. difficile can be very contagious.  She should also deep clean her bathrooms with bleach in order to prevent spread of infection.     If she is not having diarrhea anymore, I do not think we need to treat her.     Any questions or concerns please let me know.  Thanks!  Kika Rueda PA-C

## 2024-08-22 ENCOUNTER — TELEPHONE (OUTPATIENT)
Dept: GASTROENTEROLOGY | Facility: CLINIC | Age: 39
End: 2024-08-22

## 2024-08-26 DIAGNOSIS — G43.E09 CHRONIC MIGRAINE WITH AURA WITHOUT STATUS MIGRAINOSUS, NOT INTRACTABLE: ICD-10-CM

## 2024-08-27 ENCOUNTER — TELEPHONE (OUTPATIENT)
Age: 39
End: 2024-08-27

## 2024-08-27 NOTE — TELEPHONE ENCOUNTER
Patients GI provider:  Dr. Rueda    Number to return call: ([phone]    Reason for call: Pt calling to update her phone number     Scheduled procedure/appointment date if applicable:

## 2024-09-05 RX ORDER — TOPIRAMATE 25 MG/1
50 TABLET, FILM COATED ORAL
Qty: 60 TABLET | Refills: 0 | Status: SHIPPED | OUTPATIENT
Start: 2024-09-05

## 2024-09-05 RX ORDER — RIZATRIPTAN BENZOATE 10 MG/1
10 TABLET ORAL AS NEEDED
Qty: 10 TABLET | Refills: 0 | Status: SHIPPED | OUTPATIENT
Start: 2024-09-05

## 2024-09-05 NOTE — TELEPHONE ENCOUNTER
Mitch - Pt now scheduled for f/u on 9/25/2024. Rx entered for 30 day supply until pt is seen. Please review and sign if in agreement.

## 2024-09-18 ENCOUNTER — TELEPHONE (OUTPATIENT)
Dept: NEUROLOGY | Facility: CLINIC | Age: 39
End: 2024-09-18

## 2024-09-21 DIAGNOSIS — E53.8 LOW SERUM VITAMIN B12: ICD-10-CM

## 2024-09-23 RX ORDER — CYANOCOBALAMIN (VITAMIN B-12) 250 MCG
250 TABLET ORAL DAILY
Qty: 90 TABLET | Refills: 1 | Status: SHIPPED | OUTPATIENT
Start: 2024-09-23

## 2024-09-25 ENCOUNTER — OFFICE VISIT (OUTPATIENT)
Dept: NEUROLOGY | Facility: CLINIC | Age: 39
End: 2024-09-25
Payer: COMMERCIAL

## 2024-09-25 VITALS
WEIGHT: 146.4 LBS | OXYGEN SATURATION: 98 % | DIASTOLIC BLOOD PRESSURE: 68 MMHG | BODY MASS INDEX: 26.78 KG/M2 | TEMPERATURE: 97.7 F | HEART RATE: 94 BPM | SYSTOLIC BLOOD PRESSURE: 112 MMHG

## 2024-09-25 DIAGNOSIS — G43.E09 CHRONIC MIGRAINE WITH AURA WITHOUT STATUS MIGRAINOSUS, NOT INTRACTABLE: Primary | ICD-10-CM

## 2024-09-25 DIAGNOSIS — R20.0 FACIAL NUMBNESS: ICD-10-CM

## 2024-09-25 DIAGNOSIS — R29.810 FACIAL DROOP: ICD-10-CM

## 2024-09-25 PROCEDURE — 99214 OFFICE O/P EST MOD 30 MIN: CPT

## 2024-09-25 NOTE — ASSESSMENT & PLAN NOTE
- Bilateral facial numbness noted, occurring without the presence of a headache  - Complete lab work to rule out secondary deficiency or rheumatologic/autoimmune condition that could be contributing to the patient    Orders:    Vitamin B12; Future    Vitamin D 25 hydroxy; Future    Iron Panel (Includes Ferritin, Iron Sat%, Iron, and TIBC); Future    TSH, 3rd generation; Future    Lyme Total AB W Reflex to IGM/IGG; Future    Heavy metals, blood; Future    GOSIA Screen w/ Reflex to Titer/Pattern; Future    RF Screen w/ Reflex to Titer; Future    Protein electrophoresis, serum; Future

## 2024-09-25 NOTE — PROGRESS NOTES
Ambulatory Visit  Name: Zelda Mayo      : 1985      MRN: [id number]  Encounter Provider: Dionicio Britt PA-C  Encounter Date: 2024   Encounter department: [facility]    Assessment & Plan  Chronic migraine with aura without status migrainosus, not intractable  I had the pleasure of seeing Zelda today in the office at [facility] in [place].  She is presenting today for an office visit follow-up appointment in regard to her migraine headaches.  For the entirety of the visit, a  was used for Niuean to English and English to Niuean translation purposes.  The patient notes since the last visit she is doing much better in regard to her migraine headaches.  She notes that she is having sometimes more than 5 migraine days a month, she notes that some of the days she is having are severe.  She states that the migraines are less frequent since starting the Topamax for preventative therapy which the patient is still continuing at this time.  She notes that the Maxalt does seem to help with the pain of the migraines, but does not seem to be completely aborting them.  She has no other concerns in regards to her migraines, and she states that she would rather not increase her preventative medication at this time.  She would like to stick with the same dosage of the Topamax moving forward and would still like to take the Maxalt as well.  It was noted that the patient went to the ED at [facility] on 08/10/2024 with a headache and facial numbness.  The patient ended up being discharged as this right-sided numbness had been occurring for approximately 3 weeks.  No significant etiology of the numbness was identified.  However, advised the patient that since the numbness is still going on today we can do some secondary lab workup to rule out any other vitamin deficiencies or potential rheumatologic/immunologic  conditions.    - Continue Topamax 25 mg, take 2 tablets by mouth once daily at bedtime for migraine prevention  - Continue taking Maxalt 10 mg as needed for abortive therapy of migraine         Facial droop  - Previously noted right-sided facial droop  - Appears slightly improved since last visit       Facial numbness  - Bilateral facial numbness noted, occurring without the presence of a headache  - Complete lab work to rule out secondary deficiency or rheumatologic/autoimmune condition that could be contributing to the patient    Orders:    Vitamin B12; Future    Vitamin D 25 hydroxy; Future    Iron Panel (Includes Ferritin, Iron Sat%, Iron, and TIBC); Future    TSH, 3rd generation; Future    Lyme Total AB W Reflex to IGM/IGG; Future    Heavy metals, blood; Future    GOSIA Screen w/ Reflex to Titer/Pattern; Future    RF Screen w/ Reflex to Titer; Future    Protein electrophoresis, serum; Future      Patient Instructions   Additional Testing:   - Lab work ordered: Vitamin B12, vitamin D, iron panel, TSH, Lyme antibody titer, heavy metals, GOSIA, RF, protein electrophoresis    Headache Calendar  Please maintain a headache calendar  Consider using phone applications such as Migraine Buddy or Migraine Diary    Headache/migraine treatment:     Rescue medications (for immediate treatment of a headache):   It is ok to take ibuprofen, acetaminophen or naproxen (Advil, Tylenol,  Aleve, Excedrin) if they help your headaches you should limit these to No more than 3 times a week to avoid medication overuse/rebound headaches.     For your more moderate to severe migraines take this medication early   - Continue Maxalt (rizatriptan) 10mg tabs - take one at the onset of headache. May repeat one time after 2 hours if pain has not resolved.   (Max 2 a day and 10 a month)     Prescription preventive medications for headaches/migraines   (to take every day to help prevent headaches - not to take at the time of headache):  - Continue  Topamax 25 mg, take 2 tablets by mouth once daily at bedtime    *Typically these types of medications take time until you see the benefit, although some may see improvement in days, often it may take weeks, especially if the medication is being titrated up to a beneficial level. Please contact us if there are any concerns or questions regarding the medication.     Over the counter preventive supplements for headaches/migraines (if you try, try for 3 months straight)  (to take every day to help prevent headaches - not to take at the time of headache):  There are combo pills online of these - none of which regulated by FDA and double check dosing - take appropriate dose only once a day- prevent a migraine, migravent, mind ease, migrelief   [] Magnesium 400mg daily (If any diarrhea or upset stomach, decrease dose  as tolerated)  [] Riboflavin (Vitamin B2) 400mg daily (may make your urine bright/neon yellow)  - All supplements can be purchased online    Sleep and headache prevention:   [] Melatonin - you may take 1-3 mg nightly for sleep or headache prevention. You should take this 1 hour prior to bedtime consistently every night for it to work. It works by gradually helping to adjust your sleep time over days to weeks, rather than immediately making you feel sleepy.      Lifestyle Recommendations:  [x] SLEEP - Maintain a regular sleep schedule: Adults need at least 7-8 hours of uninterrupted a night. Maintain good sleep hygiene:  Going to bed and waking up at consistent times, avoiding excessive daytime naps, avoiding caffeinated beverages in the evening, avoid excessive stimulation in the evening and generally using bed primarily for sleeping.  One hour before bedtime would recommend turning lights down lower, decreasing your activity (may read quietly, listen to music at a low volume). When you get into bed, should eliminate all technology (no texting, emailing, playing with your phone, iPad or tablet in bed).  [x]  HYDRATION - Maintain good hydration.  Drink  2L of fluid a day (4 typical small water bottles)  [x] DIET - Maintain good nutrition. In particular don't skip meals and try and eat healthy balanced meals regularly.  [x] TRIGGERS - Look for other triggers and avoid them: Limit caffeine to 1-2 cups a day or less. Avoid dietary triggers that you have noticed bring on your headaches (this could include aged cheese, peanuts, MSG, aspartame and nitrates).  [x] EXERCISE - physical exercise as we all know is good for you in many ways, and not only is good for your heart, but also is beneficial for your mental health, cognitive health and  chronic pain/headaches. I would encourage at the least 5 days of physical exercise weekly for at least 30 minutes.     Education and Follow-up  [x] Please call with any questions or concerns. Of course if any new concerning symptoms go to the emergency department.  [x] Follow up in 6 months with Mitch SINHA    History of Present Illness   HPI      For Review:    The patient was last seen on 01/30/2024 by myself.  Patient was presenting at the time for an initial new patient consultation in regard to her headaches.  It is noted that the patient was having her  headaches and almost every day at the last visit.  She was taking ibuprofen and she had taken Fioricet in the past for headaches.  She noted that the ibuprofen would make the headache slightly better.  She did seem to be having auras associated with her headaches as well.  She would also migrainous symptoms of nausea, vomiting, photophobia, phonophobia, osmophobia, blurred vision, lightheadedness/dizziness, facial numbness, and paresthesias with the headaches.  It is noted that the patient never had any previous neuroimaging.  She had tried propranolol long-acting in the past which did not seem to be helpful for her.  Based on my evaluation, felt this at that the patient was having chronic migraine with aura.  Recommended that the patient  try a preventative medication for the headaches.  She was comfortable with trying Topamax 25 mg, taking 2 tablets at bedtime for preventative therapy.  She was also willing to try Maxalt 10 mg as needed for abortive therapy as well.  Noted previously that the patient had never had an MRI of the brain so felt as though it would be okay to obtain neuroimaging.  Also, as noted that the patient had some findings on examination such as a right facial droop and a right asymmetric smile.  It was noted that the patient was slightly hyperreflexic on the right side of the body compared to the left as well.  And recommended it be a good idea for the patient to have an MRI brain to assess for potential underlying stroke or other intracranial abnormality.    Current medical illnesses: moderate persistent asthma, tinea corporis, cervical radiculopathy, prediabetes, anxiety, depression, H. pylori infection, migraine       What medications do you take or have you taken for your headaches?   Current Preventive:   Topamax, Gabapentin 400 mg BID, Paxil, Seroquel, Remeron, Cyclobenzaprine     Current Abortive:   Maxalt, ibuprofen     Prior Preventive:   Propanolol LA 60 mg (not helpful), No TCAs/SNRIs for headaches due to taking Paxil     Prior Abortive:   Naproxen, Fioricet (did not work)     Interval updates as of 9/25/2024:     used for the entirety of the visit today    Having less migraines out of the month that she was previously. More than 5 days out of the month with a migraine headache, some days where the headaches can be much more severe. Taking Topamax 50 mg once daily at bedtime. Does take Maxalt for abortive therapy of her migraines currently she states. She does note that the Maxalt does not completely abort her headache but does make her feel better after taking the medication.     Patient stated that she was recently in the ED for right-sided numbness of the face that had been occurring for  approximately 3 weeks prior to that.  Visible right eye droop was noted again at the ED. Still noting facial numbness occuring on both the right and the left side of the face. Just numbness/tingling no significant pain she states. She will get this along with the headaches but states the numbness and facial swelling can occur without the headaches as well. Not sure what side it started with but does believe it started with the right side.     Review of Systems  I have personally reviewed the MA's review of systems and made changes as necessary.    Medical History Reviewed by provider this encounter:  Tobacco  Allergies  Meds  Problems  Med Hx  Surg Hx  Fam Hx       Past Medical History   Past Medical History:   Diagnosis Date    ASCUS with positive high risk HPV cervical     HPV 17    Asthma     Chlamydia infection     Depression     IBS (irritable bowel syndrome)     Migraine     Varicose veins of both lower extremities      Past Surgical History:   Procedure Laterality Date    COLPOSCOPY       Family History   Problem Relation Age of Onset    No Known Problems Mother     Cirrhosis Father     No Known Problems Sister     No Known Problems Maternal Grandmother     No Known Problems Maternal Grandfather     No Known Problems Paternal Grandmother     No Known Problems Paternal Grandfather     Diabetes Brother     No Known Problems Brother     Autism Son     Depression Son     Colon cancer Maternal Uncle     No Known Problems Cousin     Heart attack Neg Hx     Stroke Neg Hx      Current Outpatient Medications on File Prior to Visit   Medication Sig Dispense Refill    albuterol (PROVENTIL HFA,VENTOLIN HFA) 90 mcg/act inhaler INHALE 2 PUFFS EVERY 6 (SIX) HOURS AS NEEDED FOR WHEEZING 18 g 3    clotrimazole (LOTRIMIN) 1 % cream Apply topically 2 (two) times a day 45 g 1    dicyclomine (BENTYL) 10 mg capsule Take 2 capsules (20 mg total) by mouth 4 (four) times a day as needed (diarrhea, abdminal pain) 90 capsule 0     Eye Itch Relief 0.035 % ophthalmic solution INSTILL ONE DROP EN AMBOS OJOS DOS VECES AL ROGERIO JUDY INDICADO      fluticasone (FLONASE) 50 mcg/act nasal spray 2 sprays into each nostril daily 16 g 11    Fluticasone-Salmeterol (Advair Diskus) 250-50 mcg/dose inhaler Inhale 1 puff 2 (two) times a day Rinse mouth after use. 60 blister 5    gabapentin (NEURONTIN) 400 mg capsule FERN ELIZABETH CAPSULA POR VIA ORAL ROBEL VECES AL ROGERIO      methocarbamol (Robaxin-750) 750 mg tablet Take 1 tablet (750 mg total) by mouth every 6 (six) hours as needed for muscle spasms 60 tablet 2    metoclopramide (REGLAN) 5 mg tablet Take 1 tablet (5 mg total) by mouth 4 (four) times a day 30 tablet 2    mirtazapine (REMERON) 15 mg tablet       montelukast (SINGULAIR) 10 mg tablet Take 1 tablet (10 mg total) by mouth daily at bedtime 90 tablet 2    ondansetron (ZOFRAN) 4 mg tablet Take 1 tablet (4 mg total) by mouth every 8 (eight) hours as needed for nausea or vomiting 30 tablet 0    pantoprazole (PROTONIX) 40 mg tablet Take 1 tablet (40 mg total) by mouth daily before breakfast 30 tablet 4    PARoxetine (PAXIL) 40 MG tablet FERN 1 TABLETA POR VIA ORAL CADA MA?GOSIA      polyethylene glycol (GOLYTELY) 4000 mL solution Take 4,000 mL by mouth once for 1 dose Take as directed by office instructions prior to colonoscopy. 4000 mL 0    QUEtiapine (SEROquel) 300 mg tablet FERN 1 TABLETA POR VIA ORAL ANTES DE DORMIR EN LA NOCHE      rizatriptan (MAXALT) 10 mg tablet TAKE 1 TABLET (10 MG TOTAL) BY MOUTH AS NEEDED FOR MIGRAINE TAKE AT THE ONSET OF MIGRAINE; IF SYMPTOMS CONTINUE OR RETURN, MAY TAKE ANOTHER DOSE AT LEAST 2 HOURS AFTER FIRST DOSE. TAKE NO MORE THAN 2 DOSES IN A DAY. 10 tablet 0    tacrolimus (PROTOPIC) 0.1 % ointment Apply topically 2 (two) times a day Apply twice daily to areas of involvement. May burn with initial applications. 100 g 3    topiramate (TOPAMAX) 25 mg tablet TAKE 2 TABLETS (50 MG TOTAL) BY MOUTH DAILY AT BEDTIME 60 tablet 0     vitamin B-12 (CYANOCOBALAMIN) 250 MCG TABS TAKE 1 TABLET (250 MCG TOTAL) BY MOUTH DAILY 90 tablet 1     No current facility-administered medications on file prior to visit.   No Known Allergies   Current Outpatient Medications on File Prior to Visit   Medication Sig Dispense Refill    albuterol (PROVENTIL HFA,VENTOLIN HFA) 90 mcg/act inhaler INHALE 2 PUFFS EVERY 6 (SIX) HOURS AS NEEDED FOR WHEEZING 18 g 3    clotrimazole (LOTRIMIN) 1 % cream Apply topically 2 (two) times a day 45 g 1    dicyclomine (BENTYL) 10 mg capsule Take 2 capsules (20 mg total) by mouth 4 (four) times a day as needed (diarrhea, abdminal pain) 90 capsule 0    Eye Itch Relief 0.035 % ophthalmic solution INSTILL ONE DROP EN AMBOS OJOS DOS VECES AL ROGERIO JUDY INDICADO      fluticasone (FLONASE) 50 mcg/act nasal spray 2 sprays into each nostril daily 16 g 11    Fluticasone-Salmeterol (Advair Diskus) 250-50 mcg/dose inhaler Inhale 1 puff 2 (two) times a day Rinse mouth after use. 60 blister 5    gabapentin (NEURONTIN) 400 mg capsule FERN ELIZABETH CAPSULA POR VIA ORAL ROBEL VECES AL ROGERIO      methocarbamol (Robaxin-750) 750 mg tablet Take 1 tablet (750 mg total) by mouth every 6 (six) hours as needed for muscle spasms 60 tablet 2    metoclopramide (REGLAN) 5 mg tablet Take 1 tablet (5 mg total) by mouth 4 (four) times a day 30 tablet 2    mirtazapine (REMERON) 15 mg tablet       montelukast (SINGULAIR) 10 mg tablet Take 1 tablet (10 mg total) by mouth daily at bedtime 90 tablet 2    ondansetron (ZOFRAN) 4 mg tablet Take 1 tablet (4 mg total) by mouth every 8 (eight) hours as needed for nausea or vomiting 30 tablet 0    pantoprazole (PROTONIX) 40 mg tablet Take 1 tablet (40 mg total) by mouth daily before breakfast 30 tablet 4    PARoxetine (PAXIL) 40 MG tablet FERN 1 TABLETA POR VIA ORAL CADA MA?GOSIA      polyethylene glycol (GOLYTELY) 4000 mL solution Take 4,000 mL by mouth once for 1 dose Take as directed by office instructions prior to colonoscopy.  4000 mL 0    QUEtiapine (SEROquel) 300 mg tablet FERN 1 TABLETA POR VIA ORAL ANTES DE DORMIR EN LA NOCHE      rizatriptan (MAXALT) 10 mg tablet TAKE 1 TABLET (10 MG TOTAL) BY MOUTH AS NEEDED FOR MIGRAINE TAKE AT THE ONSET OF MIGRAINE; IF SYMPTOMS CONTINUE OR RETURN, MAY TAKE ANOTHER DOSE AT LEAST 2 HOURS AFTER FIRST DOSE. TAKE NO MORE THAN 2 DOSES IN A DAY. 10 tablet 0    tacrolimus (PROTOPIC) 0.1 % ointment Apply topically 2 (two) times a day Apply twice daily to areas of involvement. May burn with initial applications. 100 g 3    topiramate (TOPAMAX) 25 mg tablet TAKE 2 TABLETS (50 MG TOTAL) BY MOUTH DAILY AT BEDTIME 60 tablet 0    vitamin B-12 (CYANOCOBALAMIN) 250 MCG TABS TAKE 1 TABLET (250 MCG TOTAL) BY MOUTH DAILY 90 tablet 1     No current facility-administered medications on file prior to visit.      Social History     Tobacco Use    Smoking status: Every Day     Current packs/day: 1.00     Average packs/day: 1 pack/day for 15.0 years (15.0 ttl pk-yrs)     Types: Cigarettes    Smokeless tobacco: Never   Vaping Use    Vaping status: Never Used   Substance and Sexual Activity    Alcohol use: Yes     Comment: social, not often    Drug use: Never    Sexual activity: Yes     Partners: Male     Birth control/protection: None     Comment: trying to have a baby     Objective     /68 (BP Location: Left arm, Patient Position: Sitting, Cuff Size: Adult)   Pulse 94   Temp 97.7 °F (36.5 °C) (Temporal)   Wt 66.4 kg (146 lb 6.4 oz)   SpO2 98%   BMI 26.78 kg/m²     Physical Exam  Neurologic Exam    Physical Exam:                                                                 Vitals:            Constitutional:    /68 (BP Location: Left arm, Patient Position: Sitting, Cuff Size: Adult)   Pulse 94   Temp 97.7 °F (36.5 °C) (Temporal)   Wt 66.4 kg (146 lb 6.4 oz)   SpO2 98%   BMI 26.78 kg/m²   BP Readings from Last 3 Encounters:   09/25/24 112/68   08/12/24 122/80   05/17/24 128/82     Pulse Readings from  Last 3 Encounters:   09/25/24 94   05/17/24 78   05/14/24 80         Well developed, well nourished, well groomed. No dysmorphic features.       Psychiatric:  Normal behavior and appropriate affect        Neurological Examination:     Mental status/cognitive function:   Orientated to time, place and person. Recent and remote memory intact. Attention span and concentration as well as fund of knowledge are appropriate for age. Normal language and spontaneous speech.    Cranial Nerves:  II-visual fields full.  III, IV, VI-Pupils were equal, round, and reactive to light and accomodation. Extraocular movements were full and conjugate without nystagmus. Conjugate gaze, normal smooth pursuits, normal saccades   V-facial sensation symmetric.    VII-facial expression symmetric, intact forehead wrinkle, strong eye closure, symmetric smile    VIII-hearing grossly intact bilaterally   IX, X-palate elevation symmetric, no dysarthria.   XI-shoulder shrug strength intact    XII-tongue protrusion midline.    Motor Exam: symmetric bulk and tone throughout, no pronator drift. Power/strength 5/5 bilateral upper and lower extremities, no atrophy, fasciculations or abnormal movements noted.   Sensory: grossly intact light touch in all extremities.   Reflexes: brachioradialis 2+, biceps 2+, knee 2+ bilaterally  Coordination: Finger nose finger intact bilaterally, no apparent dysmetria, ataxia or tremor noted  Gait: steady casual and tandem gait.      Results/Data:      MRI brain without contrast, 02/07/2024:    IMPRESSION:     No intracranial abnormality.     Mild inflammatory paranasal sinus disease.    I have reviewed radiology reports from 02/07/2024 including: MRI brain.    Administrative Statements   I have spent a total time of 30 minutes in caring for this patient on the day of the visit/encounter including Diagnostic results, Risks and benefits of tx options, Instructions for management, Patient and family education, Importance  of tx compliance, Risk factor reductions, Impressions, Counseling / Coordination of care, Documenting in the medical record, Reviewing / ordering tests, medicine, procedures  , and Obtaining or reviewing history  .

## 2024-09-25 NOTE — ASSESSMENT & PLAN NOTE
I had the pleasure of seeing Zelda today in the office at [facility] in [place].  She is presenting today for an office visit follow-up appointment in regard to her migraine headaches.  For the entirety of the visit, a  was used for Swedish to English and English to Swedish translation purposes.  The patient notes since the last visit she is doing much better in regard to her migraine headaches.  She notes that she is having sometimes more than 5 migraine days a month, she notes that some of the days she is having are severe.  She states that the migraines are less frequent since starting the Topamax for preventative therapy which the patient is still continuing at this time.  She notes that the Maxalt does seem to help with the pain of the migraines, but does not seem to be completely aborting them.  She has no other concerns in regards to her migraines, and she states that she would rather not increase her preventative medication at this time.  She would like to stick with the same dosage of the Topamax moving forward and would still like to take the Maxalt as well.  It was noted that the patient went to the ED at [facility] on 08/10/2024 with a headache and facial numbness.  The patient ended up being discharged as this right-sided numbness had been occurring for approximately 3 weeks.  No significant etiology of the numbness was identified.  However, advised the patient that since the numbness is still going on today we can do some secondary lab workup to rule out any other vitamin deficiencies or potential rheumatologic/immunologic conditions.    - Continue Topamax 25 mg, take 2 tablets by mouth once daily at bedtime for migraine prevention  - Continue taking Maxalt 10 mg as needed for abortive therapy of migraine

## 2024-09-25 NOTE — PROGRESS NOTES
Review of Systems   Constitutional:  Negative for appetite change, fatigue and fever.   HENT: Negative.  Negative for hearing loss, tinnitus, trouble swallowing and voice change.    Eyes:  Positive for pain. Negative for photophobia and visual disturbance.   Respiratory: Negative.  Negative for shortness of breath.    Cardiovascular: Negative.  Negative for palpitations.   Gastrointestinal: Negative.  Negative for nausea and vomiting.   Endocrine: Negative.  Negative for cold intolerance.   Genitourinary: Negative.  Negative for dysuria, frequency and urgency.   Musculoskeletal:  Negative for back pain, gait problem, myalgias, neck pain and neck stiffness.   Skin: Negative.  Negative for rash.   Allergic/Immunologic: Negative.    Neurological:  Positive for numbness and headaches. Negative for dizziness, tremors, seizures, syncope, facial asymmetry, speech difficulty, weakness and light-headedness.   Hematological: Negative.  Does not bruise/bleed easily.   Psychiatric/Behavioral: Negative.  Negative for confusion, hallucinations and sleep disturbance.    All other systems reviewed and are negative.

## 2024-09-25 NOTE — PATIENT INSTRUCTIONS
Additional Testing:   - Lab work ordered: Vitamin B12, vitamin D, iron panel, TSH, Lyme antibody titer, heavy metals, GOSIA, RF, protein electrophoresis    Headache Calendar  Please maintain a headache calendar  Consider using phone applications such as Migraine Vinny or Migraine Diary    Headache/migraine treatment:     Rescue medications (for immediate treatment of a headache):   It is ok to take ibuprofen, acetaminophen or naproxen (Advil, Tylenol,  Aleve, Excedrin) if they help your headaches you should limit these to No more than 3 times a week to avoid medication overuse/rebound headaches.     For your more moderate to severe migraines take this medication early   - Continue Maxalt (rizatriptan) 10mg tabs - take one at the onset of headache. May repeat one time after 2 hours if pain has not resolved.   (Max 2 a day and 10 a month)     Prescription preventive medications for headaches/migraines   (to take every day to help prevent headaches - not to take at the time of headache):  - Continue Topamax 25 mg, take 2 tablets by mouth once daily at bedtime    *Typically these types of medications take time until you see the benefit, although some may see improvement in days, often it may take weeks, especially if the medication is being titrated up to a beneficial level. Please contact us if there are any concerns or questions regarding the medication.     Over the counter preventive supplements for headaches/migraines (if you try, try for 3 months straight)  (to take every day to help prevent headaches - not to take at the time of headache):  There are combo pills online of these - none of which regulated by FDA and double check dosing - take appropriate dose only once a day- prevent a migraine, migravent, mind ease, migrelief   [] Magnesium 400mg daily (If any diarrhea or upset stomach, decrease dose  as tolerated)  [] Riboflavin (Vitamin B2) 400mg daily (may make your urine bright/neon yellow)  - All supplements  can be purchased online    Sleep and headache prevention:   [] Melatonin - you may take 1-3 mg nightly for sleep or headache prevention. You should take this 1 hour prior to bedtime consistently every night for it to work. It works by gradually helping to adjust your sleep time over days to weeks, rather than immediately making you feel sleepy.      Lifestyle Recommendations:  [x] SLEEP - Maintain a regular sleep schedule: Adults need at least 7-8 hours of uninterrupted a night. Maintain good sleep hygiene:  Going to bed and waking up at consistent times, avoiding excessive daytime naps, avoiding caffeinated beverages in the evening, avoid excessive stimulation in the evening and generally using bed primarily for sleeping.  One hour before bedtime would recommend turning lights down lower, decreasing your activity (may read quietly, listen to music at a low volume). When you get into bed, should eliminate all technology (no texting, emailing, playing with your phone, iPad or tablet in bed).  [x] HYDRATION - Maintain good hydration.  Drink  2L of fluid a day (4 typical small water bottles)  [x] DIET - Maintain good nutrition. In particular don't skip meals and try and eat healthy balanced meals regularly.  [x] TRIGGERS - Look for other triggers and avoid them: Limit caffeine to 1-2 cups a day or less. Avoid dietary triggers that you have noticed bring on your headaches (this could include aged cheese, peanuts, MSG, aspartame and nitrates).  [x] EXERCISE - physical exercise as we all know is good for you in many ways, and not only is good for your heart, but also is beneficial for your mental health, cognitive health and  chronic pain/headaches. I would encourage at the least 5 days of physical exercise weekly for at least 30 minutes.     Education and Follow-up  [x] Please call with any questions or concerns. Of course if any new concerning symptoms go to the emergency department.  [x] Follow up in 6 months with Mitch  BHARATH

## 2024-09-26 DIAGNOSIS — J45.40 MODERATE PERSISTENT ASTHMA WITHOUT COMPLICATION: ICD-10-CM

## 2024-09-26 RX ORDER — FLUTICASONE PROPIONATE AND SALMETEROL 250; 50 UG/1; UG/1
1 POWDER RESPIRATORY (INHALATION) 2 TIMES DAILY
Qty: 60 BLISTER | Refills: 3 | Status: SHIPPED | OUTPATIENT
Start: 2024-09-26

## 2024-10-01 DIAGNOSIS — G43.E09 CHRONIC MIGRAINE WITH AURA WITHOUT STATUS MIGRAINOSUS, NOT INTRACTABLE: ICD-10-CM

## 2024-10-01 RX ORDER — TOPIRAMATE 25 MG/1
50 TABLET, FILM COATED ORAL
Qty: 60 TABLET | Refills: 5 | Status: SHIPPED | OUTPATIENT
Start: 2024-10-01

## 2024-10-01 RX ORDER — RIZATRIPTAN BENZOATE 10 MG/1
10 TABLET ORAL AS NEEDED
Qty: 10 TABLET | Refills: 5 | Status: SHIPPED | OUTPATIENT
Start: 2024-10-01

## 2024-11-12 ENCOUNTER — HOSPITAL ENCOUNTER (OUTPATIENT)
Dept: NON INVASIVE DIAGNOSTICS | Facility: CLINIC | Age: 39
Discharge: HOME | End: 2024-11-12
Payer: COMMERCIAL

## 2024-11-12 DIAGNOSIS — R11.2 NAUSEA AND VOMITING IN ADULT: ICD-10-CM

## 2024-11-12 DIAGNOSIS — K21.9 GASTROESOPHAGEAL REFLUX DISEASE, UNSPECIFIED WHETHER ESOPHAGITIS PRESENT: ICD-10-CM

## 2024-11-12 DIAGNOSIS — R10.13 EPIGASTRIC PAIN: ICD-10-CM

## 2024-11-12 DIAGNOSIS — R63.4 WEIGHT LOSS, ABNORMAL: ICD-10-CM

## 2024-11-12 PROCEDURE — 78264 GASTRIC EMPTYING IMG STUDY: CPT

## 2024-11-12 PROCEDURE — A9541 TC99M SULFUR COLLOID: HCPCS

## 2024-11-12 NOTE — RESULT ENCOUNTER NOTE
Patient is Slovak-speaking  Please contact the patient and let her know that her gastric emptying scan came back normal and negative for gastroparesis or slow stomach which is good news  She should keep her follow-up appointment with me 11/25 as scheduled    Any questions or concerns please let me know.   Thanks!  Kika Rueda PA-C

## 2024-11-13 ENCOUNTER — RESULTS FOLLOW-UP (OUTPATIENT)
Dept: GASTROENTEROLOGY | Facility: CLINIC | Age: 39
End: 2024-11-13

## 2024-11-25 ENCOUNTER — OFFICE VISIT (OUTPATIENT)
Dept: GASTROENTEROLOGY | Facility: CLINIC | Age: 39
End: 2024-11-25
Payer: COMMERCIAL

## 2024-11-25 VITALS
TEMPERATURE: 98.9 F | BODY MASS INDEX: 27.6 KG/M2 | WEIGHT: 150 LBS | HEIGHT: 62 IN | DIASTOLIC BLOOD PRESSURE: 60 MMHG | SYSTOLIC BLOOD PRESSURE: 102 MMHG

## 2024-11-25 DIAGNOSIS — R10.13 EPIGASTRIC PAIN: ICD-10-CM

## 2024-11-25 DIAGNOSIS — K21.9 GASTROESOPHAGEAL REFLUX DISEASE WITHOUT ESOPHAGITIS: Primary | ICD-10-CM

## 2024-11-25 PROCEDURE — 99213 OFFICE O/P EST LOW 20 MIN: CPT | Performed by: PHYSICIAN ASSISTANT

## 2024-11-25 RX ORDER — QUETIAPINE FUMARATE 100 MG/1
TABLET, FILM COATED ORAL
COMMUNITY
Start: 2024-11-01

## 2024-11-25 NOTE — PROGRESS NOTES
Name: Zelda Mayo      : 1985      MRN: [id number]  Encounter Provider: Kika Rueda PA-C  Encounter Date: 2024   Encounter department: [facility] [place]  :  Assessment & Plan  Gastroesophageal reflux disease without esophagitis  Patient's symptoms have completely resolved since last office visit aside from some intermittent nausea for which she uses Zofran or Reglan as needed.  Thankfully she has no new complaints today, no alarming symptoms.  We reviewed her prior workup at length.  At this time I recommended that she continue with pantoprazole 40 mg once daily before breakfast.  I encouraged her to use Zofran instead of Reglan as needed for nausea due to possible side effects with Reglan including tics and tremors that can be permanent.  She expressed understanding of this.  She understands that should she develop a side effect from the Reglan she should stop the Reglan immediately and call our office as well as her PCP office.  No plans for further workup at this time otherwise.  We discussed eating small, frequent meals and GERD diet/lifestyle.       Epigastric pain  Resolved.  Should symptoms return, to consider dedicated right upper quadrant ultrasound.         Patient was instructed to call the office with any questions, concerns, new/ worsening/ persisting GI symptoms. Advised patient go to the ER with any severe or worsening abdominal pain, fevers/ chills, intractable N/V, chest pain, SOB, dizziness, lightheadedness, feeling something stuck in esophagus that will not go down. Patient expressed understanding and is in agreement with treatment plan.     Will plan to follow up in 6 months     History of Present Illness       Zelda Mayo is a 39 y.o. female who presents to the office today for follow-up.  Patient was last seen in the office by me 2024, previous office note was reviewed.  Also this visit I ordered stool studies as well as a  nuclear medicine gastric emptying scan.  I also recommended that patient resume pantoprazole 40 mg once daily before breakfast.  Nuclear medicine gastric emptying scan was completed 11/12/2024, this was reviewed, this was normal and negative for gastroparesis.  Stool studies were completed 8/14/2024 and notable for normal fecal calprotectin, negative Giardia, negative stool enteric panel, positive C. difficile toxin by PCR but negative for toxins A and B consistent with likely C. difficile colonization.  At that point she did continue with diarrhea so I prescribed vancomycin 125 mcg 4 times a day for 10 days.    She declines  today. We were able to communicate effectively during the office visit and she understood me.     Patient reports feeling well. No new GI complaints or concerns today.  Her abdominal pain has significantly improved from prior office visits.  She is eating and drinking well.  She is taking pantoprazole 40 mg once daily in the morning.  She notes she is using both Zofran and Reglan as needed for nausea with relief.  BM's are regular and stool is brown and formed.  Her diarrhea resolved with treatment of C. difficile with vancomycin  Patient has gained 10 pounds since last office visit.  Patient denies any fevers/ chills, vomiting, black or bloody stools, heartburn, dysphagia, odynophagia.   Denies chest pain, SOB    Labs were completed 5/2024 reviewed notable for normal/negative celiac panel, normal TSH, CRP just slightly elevated at 3.9  EGD was completed 5/17/2024, this was reviewed, this showed a 1 cm hiatal hernia, otherwise normal stomach and duodenum.  Duodenal biopsy was negative for celiac disease.  Stomach biopsy was negative for H. pylori.  Colonoscopy was completed 5/17/2024, this was reviewed, this showed a normal colon and terminal ileum.  A transverse colon polyp was removed.  Colon polyp was unremarkable/not adenomatous.  Random colon biopsy was normal and  "negative for colitis.  Terminal ileum biopsy was normal.  She underwent CT abdomen and pelvis with contrast in April 2024 for abdominal pain which we reviewed which was is unremarkable aside from a left ovarian cyst.  This was later followed up with a pelvic ultrasound.     Review of Systems   Constitutional:  Negative for chills and fever.   HENT:  Negative for ear pain and sore throat.    Eyes:  Negative for pain and visual disturbance.   Respiratory:  Negative for cough and shortness of breath.    Cardiovascular:  Negative for chest pain and palpitations.   Gastrointestinal:  Negative for blood in stool and vomiting.   Genitourinary:  Negative for dysuria and hematuria.   Musculoskeletal:  Negative for arthralgias and back pain.   Skin:  Negative for color change and rash.   Neurological:  Negative for seizures and syncope.   All other systems reviewed and are negative.    Objective   /60 (BP Location: Right arm, Patient Position: Sitting, Cuff Size: Standard)   Temp 98.9 °F (37.2 °C) (Tympanic)   Ht 5' 2\" (1.575 m)   Wt 68 kg (150 lb)   BMI 27.44 kg/m²      Physical Exam  Vitals reviewed.   Constitutional:       General: She is not in acute distress.     Appearance: She is not toxic-appearing.   HENT:      Head: Normocephalic and atraumatic.   Eyes:      Extraocular Movements: Extraocular movements intact.      Conjunctiva/sclera: Conjunctivae normal.   Cardiovascular:      Rate and Rhythm: Normal rate and regular rhythm.   Pulmonary:      Effort: Pulmonary effort is normal. No respiratory distress.      Breath sounds: Normal breath sounds.   Abdominal:      General: Bowel sounds are normal.      Palpations: Abdomen is soft.      Tenderness: There is no abdominal tenderness.   Musculoskeletal:         General: No swelling or tenderness.      Cervical back: Normal range of motion and neck supple.   Skin:     General: Skin is warm and dry.      Coloration: Skin is not jaundiced.   Neurological:      " General: No focal deficit present.      Mental Status: She is alert and oriented to person, place, and time. Mental status is at baseline.   Psychiatric:         Mood and Affect: Mood normal.         Behavior: Behavior normal.         Thought Content: Thought content normal.     Lab Results   Component Value Date    WBC 10.38 (H) 05/08/2024    HGB 13.3 05/08/2024    HCT 42.7 05/08/2024    MCV 93 05/08/2024     05/08/2024     Lab Results   Component Value Date    SODIUM 138 08/10/2024    K 3.1 (L) 08/10/2024     (H) 08/10/2024    CO2 19 (L) 08/10/2024    AGAP 9 08/10/2024    BUN 15 08/10/2024    CREATININE 0.78 08/10/2024    GLUC 96 08/10/2024    GLUF 102 (H) 05/08/2024    CALCIUM 9.7 08/10/2024    AST 12 08/10/2024    ALT 15 08/10/2024    ALKPHOS 72 08/10/2024    TP 7.5 08/10/2024    TBILI 0.3 08/10/2024    EGFR 98 08/10/2024     Lab Results   Component Value Date    RLZ7ODZIJZKI 2.125 05/08/2024

## 2024-12-05 DIAGNOSIS — J45.40 MODERATE PERSISTENT ASTHMA WITHOUT COMPLICATION: ICD-10-CM

## 2024-12-06 DIAGNOSIS — R10.13 EPIGASTRIC PAIN: ICD-10-CM

## 2024-12-06 RX ORDER — MONTELUKAST SODIUM 10 MG/1
10 TABLET ORAL
Qty: 90 TABLET | Refills: 2 | Status: SHIPPED | OUTPATIENT
Start: 2024-12-06

## 2024-12-09 RX ORDER — ONDANSETRON 4 MG/1
4 TABLET, FILM COATED ORAL EVERY 8 HOURS PRN
Qty: 30 TABLET | Refills: 3 | Status: SHIPPED | OUTPATIENT
Start: 2024-12-09

## 2024-12-20 DIAGNOSIS — R10.13 EPIGASTRIC PAIN: ICD-10-CM

## 2024-12-20 RX ORDER — PANTOPRAZOLE SODIUM 40 MG/1
40 TABLET, DELAYED RELEASE ORAL
Qty: 30 TABLET | Refills: 5 | Status: SHIPPED | OUTPATIENT
Start: 2024-12-20

## 2025-01-08 DIAGNOSIS — J45.40 MODERATE PERSISTENT ASTHMA WITHOUT COMPLICATION: ICD-10-CM

## 2025-01-09 RX ORDER — FLUTICASONE PROPIONATE AND SALMETEROL 250; 50 UG/1; UG/1
1 POWDER RESPIRATORY (INHALATION) 2 TIMES DAILY
Qty: 60 BLISTER | Refills: 3 | Status: SHIPPED | OUTPATIENT
Start: 2025-01-09

## 2025-01-13 ENCOUNTER — TELEPHONE (OUTPATIENT)
Dept: INTERNAL MEDICINE CLINIC | Facility: CLINIC | Age: 40
End: 2025-01-13

## 2025-02-21 DIAGNOSIS — J31.0 CHRONIC RHINITIS: ICD-10-CM

## 2025-02-21 RX ORDER — FLUTICASONE PROPIONATE 50 MCG
2 SPRAY, SUSPENSION (ML) NASAL DAILY
Qty: 16 G | Refills: 11 | Status: SHIPPED | OUTPATIENT
Start: 2025-02-21

## 2025-02-27 DIAGNOSIS — E53.8 LOW SERUM VITAMIN B12: ICD-10-CM

## 2025-02-27 RX ORDER — CYANOCOBALAMIN (VITAMIN B-12) 250 MCG
250 TABLET ORAL DAILY
Qty: 90 TABLET | Refills: 1 | Status: SHIPPED | OUTPATIENT
Start: 2025-02-27

## 2025-03-06 DIAGNOSIS — G43.E09 CHRONIC MIGRAINE WITH AURA WITHOUT STATUS MIGRAINOSUS, NOT INTRACTABLE: ICD-10-CM

## 2025-03-06 RX ORDER — TOPIRAMATE 25 MG/1
50 TABLET, FILM COATED ORAL
Qty: 60 TABLET | Refills: 5 | Status: SHIPPED | OUTPATIENT
Start: 2025-03-06

## 2025-03-06 RX ORDER — RIZATRIPTAN BENZOATE 10 MG/1
10 TABLET ORAL AS NEEDED
Qty: 10 TABLET | Refills: 5 | Status: SHIPPED | OUTPATIENT
Start: 2025-03-06

## 2025-04-23 DIAGNOSIS — J45.40 MODERATE PERSISTENT ASTHMA WITHOUT COMPLICATION: ICD-10-CM

## 2025-04-24 RX ORDER — FLUTICASONE PROPIONATE AND SALMETEROL 250; 50 UG/1; UG/1
1 POWDER RESPIRATORY (INHALATION) 2 TIMES DAILY
Qty: 60 BLISTER | Refills: 3 | Status: SHIPPED | OUTPATIENT
Start: 2025-04-24

## 2025-05-23 ENCOUNTER — RA CDI HCC (OUTPATIENT)
Dept: OTHER | Facility: HOSPITAL | Age: 40
End: 2025-05-23

## 2025-05-23 NOTE — PROGRESS NOTES
HCC coding opportunities          Chart Reviewed number of suggestions sent to Provider: 2   J45.40(Moderate persistent asthma),  M47.812(Spondylosis without myelopathy or radiculopathy, cervical region)     Found on active problem list - Please assess and document with MEAT as appropriate.     [facility] AUDIT      Patients Insurance        Commercial Insurance: Paybook        4

## 2025-05-26 DIAGNOSIS — R10.13 EPIGASTRIC PAIN: ICD-10-CM

## 2025-05-27 ENCOUNTER — TELEPHONE (OUTPATIENT)
Dept: GASTROENTEROLOGY | Facility: CLINIC | Age: 40
End: 2025-05-27

## 2025-05-27 RX ORDER — PANTOPRAZOLE SODIUM 40 MG/1
40 TABLET, DELAYED RELEASE ORAL
Qty: 30 TABLET | Refills: 5 | Status: SHIPPED | OUTPATIENT
Start: 2025-05-27

## 2025-07-09 ENCOUNTER — TELEPHONE (OUTPATIENT)
Dept: INTERNAL MEDICINE CLINIC | Facility: CLINIC | Age: 40
End: 2025-07-09

## 2025-08-07 DIAGNOSIS — J45.40 MODERATE PERSISTENT ASTHMA WITHOUT COMPLICATION: ICD-10-CM

## 2025-08-07 RX ORDER — FLUTICASONE PROPIONATE AND SALMETEROL 250; 50 UG/1; UG/1
1 POWDER RESPIRATORY (INHALATION) 2 TIMES DAILY
Qty: 60 BLISTER | Refills: 1 | Status: SHIPPED | OUTPATIENT
Start: 2025-08-07